# Patient Record
Sex: FEMALE | Race: WHITE | Employment: FULL TIME | URBAN - METROPOLITAN AREA
[De-identification: names, ages, dates, MRNs, and addresses within clinical notes are randomized per-mention and may not be internally consistent; named-entity substitution may affect disease eponyms.]

---

## 2017-03-10 ENCOUNTER — HOSPITAL ENCOUNTER (OUTPATIENT)
Age: 42
Discharge: HOME | End: 2017-03-10
Payer: COMMERCIAL

## 2017-03-10 VITALS
DIASTOLIC BLOOD PRESSURE: 78 MMHG | BODY MASS INDEX: 27.6 KG/M2 | SYSTOLIC BLOOD PRESSURE: 131 MMHG | TEMPERATURE: 98 F | RESPIRATION RATE: 18 BRPM | HEART RATE: 88 BPM | HEIGHT: 62 IN | WEIGHT: 150 LBS | OXYGEN SATURATION: 100 %

## 2017-03-10 DIAGNOSIS — H61.22 IMPACTED CERUMEN OF LEFT EAR: Primary | ICD-10-CM

## 2017-03-10 PROCEDURE — 99203 OFFICE O/P NEW LOW 30 MIN: CPT

## 2017-03-10 PROCEDURE — 69209 REMOVE IMPACTED EAR WAX UNI: CPT

## 2017-03-10 PROCEDURE — 99204 OFFICE O/P NEW MOD 45 MIN: CPT

## 2017-03-10 RX ORDER — DULOXETIN HYDROCHLORIDE 60 MG/1
60 CAPSULE, DELAYED RELEASE ORAL DAILY
COMMUNITY
End: 2017-11-29

## 2017-03-10 RX ORDER — HYDROCODONE BITARTRATE AND ACETAMINOPHEN 7.5; 325 MG/1; MG/1
1 TABLET ORAL EVERY 8 HOURS PRN
COMMUNITY
End: 2017-11-29

## 2017-03-10 RX ORDER — OFLOXACIN 3 MG/ML
10 SOLUTION AURICULAR (OTIC) DAILY
Qty: 1 BOTTLE | Refills: 0 | Status: SHIPPED | OUTPATIENT
Start: 2017-03-10 | End: 2017-03-17

## 2017-03-10 RX ORDER — MULTIVIT-MIN/IRON/FOLIC ACID/K 18-600-40
4000 CAPSULE ORAL
COMMUNITY

## 2017-03-10 NOTE — ED PROVIDER NOTES
Patient presents with:  Ear Problem Pain (neurosensory)      HPI:     Mercedes Crooks is a 37year old female who presents with a chief complaint of waking up this morning with the left ear feeling clogged. Patient denies any pain.   No history of URI sympt normocephalic  EYES: sclera non icteric bilateral, conjunctiva clear  EARS: TM  right: normal and cerumen impacted  left  NOSE: nasal turbinates: pink, normal mucosa  THROAT: clear, without exudates  LUNGS: clear to auscultation bilaterally; no rales, rhon

## 2017-07-08 ENCOUNTER — HOSPITAL ENCOUNTER (OUTPATIENT)
Dept: MRI IMAGING | Age: 42
Discharge: HOME | End: 2017-07-08
Attending: PHYSICIAN ASSISTANT
Payer: COMMERCIAL

## 2017-07-08 DIAGNOSIS — M25.512 LEFT SHOULDER PAIN, UNSPECIFIED CHRONICITY: ICD-10-CM

## 2017-07-08 PROCEDURE — 73221 MRI JOINT UPR EXTREM W/O DYE: CPT | Performed by: PHYSICIAN ASSISTANT

## 2017-11-22 ENCOUNTER — APPOINTMENT (OUTPATIENT)
Dept: LAB | Age: 42
End: 2017-11-22
Attending: INTERNAL MEDICINE
Payer: COMMERCIAL

## 2017-11-22 ENCOUNTER — OFFICE VISIT (OUTPATIENT)
Dept: RHEUMATOLOGY | Facility: CLINIC | Age: 42
End: 2017-11-22

## 2017-11-22 VITALS
BODY MASS INDEX: 27.08 KG/M2 | DIASTOLIC BLOOD PRESSURE: 88 MMHG | HEIGHT: 62 IN | WEIGHT: 147.19 LBS | SYSTOLIC BLOOD PRESSURE: 130 MMHG | HEART RATE: 88 BPM

## 2017-11-22 DIAGNOSIS — M43.06 LUMBAR SPONDYLOLYSIS: ICD-10-CM

## 2017-11-22 DIAGNOSIS — R53.83 FATIGUE, UNSPECIFIED TYPE: ICD-10-CM

## 2017-11-22 DIAGNOSIS — E55.9 VITAMIN D DEFICIENCY: ICD-10-CM

## 2017-11-22 DIAGNOSIS — E06.3 HASHIMOTO'S THYROIDITIS: ICD-10-CM

## 2017-11-22 DIAGNOSIS — M79.7 FIBROMYALGIA: Primary | ICD-10-CM

## 2017-11-22 PROCEDURE — 36415 COLL VENOUS BLD VENIPUNCTURE: CPT

## 2017-11-22 PROCEDURE — 84165 PROTEIN E-PHORESIS SERUM: CPT

## 2017-11-22 PROCEDURE — 85027 COMPLETE CBC AUTOMATED: CPT

## 2017-11-22 PROCEDURE — 99244 OFF/OP CNSLTJ NEW/EST MOD 40: CPT | Performed by: INTERNAL MEDICINE

## 2017-11-22 PROCEDURE — 99212 OFFICE O/P EST SF 10 MIN: CPT | Performed by: INTERNAL MEDICINE

## 2017-11-22 PROCEDURE — 80053 COMPREHEN METABOLIC PANEL: CPT

## 2017-11-22 PROCEDURE — 84439 ASSAY OF FREE THYROXINE: CPT

## 2017-11-22 PROCEDURE — 84481 FREE ASSAY (FT-3): CPT

## 2017-11-22 PROCEDURE — 85652 RBC SED RATE AUTOMATED: CPT

## 2017-11-22 PROCEDURE — 84443 ASSAY THYROID STIM HORMONE: CPT

## 2017-11-22 PROCEDURE — 82306 VITAMIN D 25 HYDROXY: CPT

## 2017-11-22 RX ORDER — DULOXETIN HYDROCHLORIDE 30 MG/1
30 CAPSULE, DELAYED RELEASE ORAL DAILY
Refills: 0 | COMMUNITY
Start: 2017-11-20 | End: 2017-11-29

## 2017-11-22 RX ORDER — TOPIRAMATE 100 MG/1
100 TABLET, FILM COATED ORAL NIGHTLY
Refills: 0 | COMMUNITY
Start: 2017-11-06 | End: 2017-11-29

## 2017-11-22 RX ORDER — TIZANIDINE 2 MG/1
2 TABLET ORAL 2 TIMES DAILY
Refills: 0 | COMMUNITY
Start: 2017-11-06 | End: 2018-02-12

## 2017-11-22 NOTE — PROGRESS NOTES
Dear Dr. Misa Boyd:    I saw your patient Melissa Mobley in consultation this afternoon at your request for evaluation of diffuse and chronic pain. As you know, she has a 46-year-old woman who started to develop diffuse myofascial discomfort in her 25s.   It children. She does office work. She smokes one half pack of cigarettes daily. This increases when she is stressed. Rare alcohol. Coffee daily. She tries to walk. Review of systems:  No fevers. She will go from freezing to having hot flashes.   Her Cymbalta. I gave her an up-to-date article on \"fibromyalgia\". I referred her to physical therapy for 12 visits to work on a slowly increasing exercise program.    1.  Rule out other conditions.   She had thyroid antibodies in the past.  TSH, free T3 and

## 2017-11-29 ENCOUNTER — OFFICE VISIT (OUTPATIENT)
Dept: FAMILY MEDICINE CLINIC | Facility: CLINIC | Age: 42
End: 2017-11-29

## 2017-11-29 VITALS
HEIGHT: 61.5 IN | WEIGHT: 144 LBS | BODY MASS INDEX: 26.84 KG/M2 | OXYGEN SATURATION: 99 % | HEART RATE: 112 BPM | SYSTOLIC BLOOD PRESSURE: 138 MMHG | DIASTOLIC BLOOD PRESSURE: 80 MMHG

## 2017-11-29 DIAGNOSIS — F41.9 ANXIETY AND DEPRESSION: ICD-10-CM

## 2017-11-29 DIAGNOSIS — F32.A ANXIETY AND DEPRESSION: ICD-10-CM

## 2017-11-29 DIAGNOSIS — K59.03 DRUG-INDUCED CONSTIPATION: ICD-10-CM

## 2017-11-29 DIAGNOSIS — G89.4 CHRONIC PAIN DISORDER: ICD-10-CM

## 2017-11-29 DIAGNOSIS — M79.7 FIBROMYALGIA: Primary | ICD-10-CM

## 2017-11-29 DIAGNOSIS — M25.519 ACUTE SHOULDER PAIN, UNSPECIFIED LATERALITY: ICD-10-CM

## 2017-11-29 PROCEDURE — 99204 OFFICE O/P NEW MOD 45 MIN: CPT | Performed by: FAMILY MEDICINE

## 2017-11-29 RX ORDER — TOPIRAMATE 100 MG/1
100 TABLET, FILM COATED ORAL NIGHTLY
Qty: 30 TABLET | Refills: 0 | Status: SHIPPED | OUTPATIENT
Start: 2017-11-29 | End: 2017-12-29

## 2017-11-29 RX ORDER — HYDROCODONE BITARTRATE AND ACETAMINOPHEN 7.5; 325 MG/1; MG/1
1 TABLET ORAL EVERY 12 HOURS PRN
Qty: 18 TABLET | Refills: 0 | Status: SHIPPED | OUTPATIENT
Start: 2017-11-29 | End: 2017-12-08

## 2017-11-29 RX ORDER — DULOXETIN HYDROCHLORIDE 60 MG/1
60 CAPSULE, DELAYED RELEASE ORAL DAILY
Qty: 30 CAPSULE | Refills: 0 | Status: SHIPPED | OUTPATIENT
Start: 2017-11-29 | End: 2018-02-28

## 2017-11-29 RX ORDER — POLYETHYLENE GLYCOL 3350 17 G/17G
17 POWDER, FOR SOLUTION ORAL DAILY
Qty: 510 G | Refills: 0 | Status: SHIPPED | OUTPATIENT
Start: 2017-11-29 | End: 2017-12-29

## 2017-11-29 RX ORDER — DULOXETIN HYDROCHLORIDE 30 MG/1
30 CAPSULE, DELAYED RELEASE ORAL DAILY
Qty: 30 CAPSULE | Refills: 0 | Status: SHIPPED | OUTPATIENT
Start: 2017-11-29 | End: 2017-12-29

## 2017-11-29 NOTE — PROGRESS NOTES
HPI:    Patient ID: Daniel Evans is a 40year old female. Fibromyalgia  -Goes to Pain clinic in [address] for zohydro and norco along with trigger point injection.  Sees TERESA Enciso  -On 10/2017 terminated from going to clinic due to high shilpa Rfl: 0   topiramate 100 MG Oral Tab Take 1 tablet (100 mg total) by mouth nightly. Disp: 30 tablet Rfl: 0   DULoxetine HCl 60 MG Oral Cap DR Particles Take 1 capsule (60 mg total) by mouth daily.  Disp: 30 capsule Rfl: 0   Polyethylene Glycol 3350 (MIRALAX) balance  -referral given for pain clinic. Will benefit from addiction program  -reviewed opiate usage with [place] .   -off zyhydro x1mo -> will NOT refill  -running out of norco (5d left) will refill to last total of 2wks  -continue PT  -concern that may be PSYCHIATRY         IG#[id number]

## 2017-11-30 NOTE — PATIENT INSTRUCTIONS
Treating Anxiety Disorders with Medicine  An anxiety disorder can make you feel nervous or apprehensive, even without a clear reason.  In people age 72 and older, generalized anxiety disorder is one of the most commonly diagnosed anxiety disorders. Many t Never use alcohol or other drugs with anti-anxiety medicines. This could result in loss of muscular control, sedation, coma, or death. Also, use only the amount of medicine prescribed for you.  If you think you may have taken too much, get emergency care ri · Sexual problems. Some antidepressants can affect your desire for sex or your ability to have an orgasm. A change in dosage or medicine often solves the problem. If you have a sexual side effect that concerns you, tell your healthcare provider.   · Addicti Treatment can greatly reduce pain. In many cases, pain can become less severe, occur less often, and interfere less with your daily life. Chronic pain is often treated with a combination of medicines, therapies, and lifestyle changes.  You will work closely © 5275-3145 [employer] [address]. [address] [facility], [facility] [place]. All rights reserved. This information is not intended as a substitute for professional medical care. Always follow your healthcare professional's instructions.         Joi · Anti-anxiety medicine. This medicine eases symptoms and helps you relax. Your healthcare provider will explain when and how to use it. It may be prescribed for use before situations that make you anxious.  You may also be told to take medicine on a regula · You may need to stop taking medicine slowly to give your body time to adjust. When it’s time to stop, your healthcare provider will tell you more.  Remember: Never stop taking your medicine without talking to your provider first.  · If symptoms return, yo Fiber is what gives strength and structure to plants. Most grains, beans, vegetables, and fruits contain fiber. Foods rich in fiber are often low in calories and fat, and they fill you up more.  They may also reduce your risks for certain health problems. T Keep track of how much fiber you eat. Start by reading food labels. Then eat a variety of foods high in fiber.  As you start to eat more fiber, ask your healthcare provider how much water you should be drinking to keep your digestive system working smoothly One of the best ways to help treat constipation is to increase your fiber intake. You can do this either through diet or by using fiber supplements. Fiber (in whole grains, fruits, and vegetables) adds bulk and absorbs water to soften the stool.  This helps

## 2017-12-06 ENCOUNTER — TELEPHONE (OUTPATIENT)
Dept: RHEUMATOLOGY | Facility: CLINIC | Age: 42
End: 2017-12-06

## 2017-12-11 ENCOUNTER — OFFICE VISIT (OUTPATIENT)
Dept: FAMILY MEDICINE CLINIC | Facility: CLINIC | Age: 42
End: 2017-12-11

## 2017-12-11 VITALS
BODY MASS INDEX: 27 KG/M2 | DIASTOLIC BLOOD PRESSURE: 64 MMHG | HEART RATE: 95 BPM | SYSTOLIC BLOOD PRESSURE: 110 MMHG | RESPIRATION RATE: 18 BRPM | OXYGEN SATURATION: 98 % | WEIGHT: 145 LBS

## 2017-12-11 DIAGNOSIS — M79.7 FIBROMYALGIA: ICD-10-CM

## 2017-12-11 DIAGNOSIS — F41.9 ANXIETY AND DEPRESSION: ICD-10-CM

## 2017-12-11 DIAGNOSIS — H91.91 HEARING LOSS OF RIGHT EAR, UNSPECIFIED HEARING LOSS TYPE: ICD-10-CM

## 2017-12-11 DIAGNOSIS — H93.11 TINNITUS OF RIGHT EAR: Primary | ICD-10-CM

## 2017-12-11 DIAGNOSIS — F32.A ANXIETY AND DEPRESSION: ICD-10-CM

## 2017-12-11 PROCEDURE — 99213 OFFICE O/P EST LOW 20 MIN: CPT | Performed by: FAMILY MEDICINE

## 2017-12-11 RX ORDER — HYDROCODONE BITARTRATE AND ACETAMINOPHEN 7.5; 325 MG/1; MG/1
1 TABLET ORAL 2 TIMES DAILY PRN
Qty: 14 TABLET | Refills: 0 | Status: SHIPPED | OUTPATIENT
Start: 2017-12-11 | End: 2017-12-18

## 2017-12-11 RX ORDER — HYDROCODONE BITARTRATE AND ACETAMINOPHEN 7.5; 325 MG/1; MG/1
TABLET ORAL
Refills: 0 | COMMUNITY
Start: 2017-11-06 | End: 2017-12-11

## 2017-12-11 NOTE — PATIENT INSTRUCTIONS
Tinnitus (Ringing in the Ears)     Treatment may include maskers and hearing aids. Tinnitus is the term for a noise in your ear not caused by an outside sound. The noise might be a ringing, clicking, hiss, or roar.  It can vary in pitch and may be sof © 5044-3942 [employer] [address]. 140[address] [facility] [place], [address]. All rights reserved. This information is not intended as a substitute for professional medical care. Always follow your healthcare professional's instructions.         Your He · An otolaryngologist (ear, nose, and throat healthcare provider) examines you to find out if there is a medical reason for your hearing loss. If there is, he or she may recommend treatment in addition to, or instead of, hearing aids.   · A hearing aid spec · Learn how to do exercises properly and safely. Consider talking with a physical therapist or  used to working with people with arthritis. · Start gradually and build. If you haven't been exercising, start slowly.  Don't exercise too hard or too lo Range-of-motion (ROM) exercises allow you to move each of your joints in every way they are intended to move. You should do ROM exercises for each joint 2 to 3 times a day. This will help you maintain full use of all of your joints.   Sample ROM exercises Many other exercise and activities benefit people with arthritis. It is most important to find exercise and activities that you enjoy.  You might try:  · Yoga, including chair yoga, helps to keep your joints strong and flexible.   · Zeyad Chi, an ancient type

## 2017-12-11 NOTE — PROGRESS NOTES
HPI:    Patient ID: Timbo Tsang is a 40year old female. -Feeling much better compared to prior visit    Fibromyalgia  -has upcoming pain clinic appt on the 20th  -previous regimen was: zohydro BID and norco 7.5mg TID. off zohydro for 2mo.  Doing Norc 1 tablet (100 mg total) by mouth nightly. Disp: 30 tablet Rfl: 0   DULoxetine HCl 60 MG Oral Cap DR Particles Take 1 capsule (60 mg total) by mouth daily. Disp: 30 capsule Rfl: 0   Polyethylene Glycol 3350 (MIRALAX) Oral Powder Take 17 g by mouth daily.  Jaziel Fuchs referral    Fibromyalgia  -stable  -terminated from previous external pain clinic due to high balance  -has upcoming pain clinic appt. Goal to wean off meds ad try more IM options  -reviewed opiate usage with [place] .   -off zyhydro x2mo -> will NOT refill

## 2017-12-20 ENCOUNTER — OFFICE VISIT (OUTPATIENT)
Dept: PAIN CLINIC | Facility: HOSPITAL | Age: 42
End: 2017-12-20
Attending: FAMILY MEDICINE
Payer: COMMERCIAL

## 2017-12-20 VITALS
SYSTOLIC BLOOD PRESSURE: 146 MMHG | HEIGHT: 61.6 IN | WEIGHT: 144 LBS | BODY MASS INDEX: 26.84 KG/M2 | DIASTOLIC BLOOD PRESSURE: 96 MMHG | HEART RATE: 120 BPM

## 2017-12-20 DIAGNOSIS — M79.7 FIBROMYALGIA: Primary | ICD-10-CM

## 2017-12-20 DIAGNOSIS — G89.4 CHRONIC PAIN DISORDER: ICD-10-CM

## 2017-12-20 PROCEDURE — 80349 CANNABINOIDS NATURAL: CPT | Performed by: ANESTHESIOLOGY

## 2017-12-20 PROCEDURE — 99201 HC OUTPT EVAL AND MGNT NEW PT LEVEL 1: CPT

## 2017-12-20 PROCEDURE — 80307 DRUG TEST PRSMV CHEM ANLYZR: CPT | Performed by: ANESTHESIOLOGY

## 2017-12-20 PROCEDURE — 80361 OPIATES 1 OR MORE: CPT | Performed by: ANESTHESIOLOGY

## 2017-12-20 RX ORDER — HYDROCODONE BITARTRATE AND ACETAMINOPHEN 10; 325 MG/1; MG/1
1 TABLET ORAL 2 TIMES DAILY
Qty: 60 TABLET | Refills: 0 | Status: SHIPPED | OUTPATIENT
Start: 2017-12-20 | End: 2017-12-20

## 2017-12-20 RX ORDER — HYDROCODONE BITARTRATE AND ACETAMINOPHEN 10; 325 MG/1; MG/1
1 TABLET ORAL 2 TIMES DAILY
Qty: 60 TABLET | Refills: 0 | Status: SHIPPED | OUTPATIENT
Start: 2018-01-19 | End: 2018-02-12

## 2017-12-20 RX ORDER — MELOXICAM 15 MG/1
15 TABLET ORAL DAILY
Qty: 30 TABLET | Refills: 2 | Status: SHIPPED | OUTPATIENT
Start: 2017-12-20 | End: 2018-03-16

## 2017-12-20 RX ORDER — HYDROCODONE BITARTRATE AND ACETAMINOPHEN 7.5; 325 MG/1; MG/1
1 TABLET ORAL EVERY 12 HOURS PRN
COMMUNITY
End: 2018-02-12

## 2017-12-20 NOTE — PROGRESS NOTES
Fibromyalgia  -Goes to Pain clinic in [place] Q1mo for zohydro and norco along with trigger point injection. Sees TERESA Gong Memos  -On 10/2017 terminated from going to clinic due to high balance which patient is unable to pay at once.  They only provided N

## 2017-12-20 NOTE — CHRONIC PAIN
Initial Consultation Note      HISTORY OF PRESENT ILLNESS:  Jennifer Yost is a 40year old old female referred to the pain clinic  for evaluation treatment of her fibromyalgia type symptoms Hollis Rojo was seen at a pain clinic in [facility] for quite some Polyethylene Glycol 3350 (MIRALAX) Oral Powder Take 17 g by mouth daily. Disp: 510 g Rfl: 0   DULoxetine HCl 30 MG Oral Cap DR Particles Take 1 capsule (30 mg total) by mouth daily.  Total of 90mg Disp: 30 capsule Rfl: 0   TiZANidine HCl 2 MG Oral Tab [facility] TRANSFORAMINAL EPIDURAL                STEROID INJECTION;  Surgeon: Santi Rodriguez DO;  Location: [address] [place] [address]    REVIEW OF SYSTEMS:   A comprehensive review of systems was negative except for:     MEDICAL HISTORY:  Patient Ac - No  Spine: Normal    ROM:   Lumbar spine  Flexion  full  Extension  Cervical Spine  Flexion full  Extension  MOTOR EXAMINATION:  UPPER EXTREMITY      LEFT RIGHT   Deltoid 5/5 5/5   Biceps 5/5 5/5   Triceps 5/5 5/5   Brachioradialis 5/5 5/5   Wrist Flexor thoroughly with Mrs. alvarado   will try to minimize the reliance on pain medications she is down to 2 Norco a day which is a decrease from the prior pain clinic that she is on and she is comfortable with this level  Psych support for her depression health mat

## 2017-12-20 NOTE — PROGRESS NOTES
Fibromyalgia  -Goes to Pain clinic in [place] Q1mo for zohydro and norco along with trigger point injection. Sees PTIMOTHY Finch  -On 10/2017 terminated from going to clinic due to high balance which patient is unable to pay at once.  They only provided N

## 2018-01-30 RX ORDER — DULOXETIN HYDROCHLORIDE 30 MG/1
CAPSULE, DELAYED RELEASE ORAL
Qty: 30 CAPSULE | Refills: 0 | Status: SHIPPED | OUTPATIENT
Start: 2018-01-30 | End: 2018-02-28

## 2018-02-12 ENCOUNTER — OFFICE VISIT (OUTPATIENT)
Dept: PAIN CLINIC | Facility: HOSPITAL | Age: 43
End: 2018-02-12
Attending: NURSE PRACTITIONER
Payer: COMMERCIAL

## 2018-02-12 VITALS — HEART RATE: 108 BPM | DIASTOLIC BLOOD PRESSURE: 87 MMHG | SYSTOLIC BLOOD PRESSURE: 141 MMHG

## 2018-02-12 DIAGNOSIS — M79.7 FIBROMYALGIA: Primary | ICD-10-CM

## 2018-02-12 PROCEDURE — 99211 OFF/OP EST MAY X REQ PHY/QHP: CPT

## 2018-02-12 RX ORDER — MELOXICAM 15 MG/1
15 TABLET ORAL DAILY
COMMUNITY
End: 2018-08-16

## 2018-02-12 RX ORDER — HYDROCODONE BITARTRATE AND ACETAMINOPHEN 10; 325 MG/1; MG/1
1 TABLET ORAL EVERY 12 HOURS
Qty: 60 TABLET | Refills: 0 | Status: SHIPPED | OUTPATIENT
Start: 2018-03-18 | End: 2018-04-02

## 2018-02-12 RX ORDER — HYDROCODONE BITARTRATE AND ACETAMINOPHEN 10; 325 MG/1; MG/1
1 TABLET ORAL 2 TIMES DAILY
Qty: 60 TABLET | Refills: 0 | Status: SHIPPED | OUTPATIENT
Start: 2018-02-17 | End: 2018-03-19

## 2018-02-12 RX ORDER — TOPIRAMATE 100 MG/1
100 TABLET, FILM COATED ORAL DAILY
COMMUNITY
End: 2018-06-24

## 2018-02-12 NOTE — PROGRESS NOTES
Patient presents to [facility] ambulatory for med eval.  She has hx of fibromyalgia with generalized pain, neck, low back, arms thighs. She describes the pain and burning and very sensitive skin. She takes norco BID for pain which helps her tolerate activity.

## 2018-02-12 NOTE — CHRONIC PAIN
MEDICATION EVALUATION  HISTORY OF PRESENT ILLNESS:  Mercedes Cesar is a 40year old old female with history of Fibromyalgia  (primary encounter diagnosis) who returns for medication evaluation. Patient continues to report upper back and lower back pain.  Pa pain.  Numbness/tingling: as above  Weakness: as above  Weight Loss: Negative   Fever: Negative   Cardiovascular:  No current chest pain or palpitations   Respiratory:  No current shortness of breath   Gastrointestinal:  No active ulcer  Genitourinary:  Ne Location: [address] [place] [address]  9/24/2015: INJECTION, W/WO CONTRAST, DX/THERAPEUTIC SUBST* N/A      Comment: Procedure: LUMBAR / TRANSFORAMINAL EPIDURAL                STEROID INJECTION;  Surgeon: Do Morgan DO;  [place] [facility] interested in obtaining medical marijuana card. We discussed that if she obtains this and has relief we would wean off the Clarissa Joshua. She and her  verbalized understanding of this and will notify the pain clinic if card obtained.      - Discussed boris

## 2018-02-23 ENCOUNTER — OFFICE VISIT (OUTPATIENT)
Dept: FAMILY MEDICINE CLINIC | Facility: CLINIC | Age: 43
End: 2018-02-23

## 2018-02-23 VITALS
HEART RATE: 95 BPM | WEIGHT: 153 LBS | DIASTOLIC BLOOD PRESSURE: 70 MMHG | RESPIRATION RATE: 18 BRPM | SYSTOLIC BLOOD PRESSURE: 120 MMHG | BODY MASS INDEX: 28.52 KG/M2 | HEIGHT: 61.5 IN | OXYGEN SATURATION: 98 %

## 2018-02-23 DIAGNOSIS — M79.7 FIBROMYALGIA: Primary | ICD-10-CM

## 2018-02-23 DIAGNOSIS — E06.3 HASHIMOTO'S THYROIDITIS: ICD-10-CM

## 2018-02-23 DIAGNOSIS — R63.5 WEIGHT GAIN: ICD-10-CM

## 2018-02-23 PROCEDURE — 99213 OFFICE O/P EST LOW 20 MIN: CPT | Performed by: FAMILY MEDICINE

## 2018-02-23 NOTE — PROGRESS NOTES
HPI:    Patient ID: Beverly Merrill is a 40year old female. Fibromyalgia  -stable  -f/u with pain clinic regularly  -for past 1mo using CBD oil with significant improvement.  Has been able to wean off to only 2 norco/day  -interested in medical marijuana DR Particles Take 1 capsule (60 mg total) by mouth daily. Disp: 30 capsule Rfl: 0   Cholecalciferol (VITAMIN D) 2000 units Oral Cap Take 4,000 Units by mouth.    Disp:  Rfl:      Allergies:No Known Allergies   PHYSICAL EXAM:   Physical Exam   Vitals reviewe

## 2018-03-19 RX ORDER — MELOXICAM 15 MG/1
TABLET ORAL
Qty: 90 TABLET | Refills: 0 | Status: SHIPPED | OUTPATIENT
Start: 2018-03-19 | End: 2018-08-16

## 2018-04-02 ENCOUNTER — OFFICE VISIT (OUTPATIENT)
Dept: PAIN CLINIC | Facility: HOSPITAL | Age: 43
End: 2018-04-02
Attending: ANESTHESIOLOGY
Payer: COMMERCIAL

## 2018-04-02 VITALS
SYSTOLIC BLOOD PRESSURE: 136 MMHG | BODY MASS INDEX: 28.52 KG/M2 | RESPIRATION RATE: 16 BRPM | HEIGHT: 61.5 IN | DIASTOLIC BLOOD PRESSURE: 89 MMHG | HEART RATE: 111 BPM | WEIGHT: 153 LBS

## 2018-04-02 DIAGNOSIS — E06.3 HASHIMOTO'S THYROIDITIS: ICD-10-CM

## 2018-04-02 DIAGNOSIS — G89.4 CHRONIC PAIN DISORDER: ICD-10-CM

## 2018-04-02 DIAGNOSIS — M79.7 FIBROMYALGIA: Primary | ICD-10-CM

## 2018-04-02 PROCEDURE — 99211 OFF/OP EST MAY X REQ PHY/QHP: CPT

## 2018-04-02 RX ORDER — HYDROCODONE BITARTRATE AND ACETAMINOPHEN 10; 325 MG/1; MG/1
1 TABLET ORAL EVERY 12 HOURS
Qty: 60 TABLET | Refills: 0 | Status: SHIPPED | OUTPATIENT
Start: 2018-04-19 | End: 2018-04-02

## 2018-04-02 RX ORDER — HYDROCODONE BITARTRATE AND ACETAMINOPHEN 10; 325 MG/1; MG/1
1 TABLET ORAL EVERY 12 HOURS
Qty: 60 TABLET | Refills: 0 | Status: SHIPPED | OUTPATIENT
Start: 2018-05-19 | End: 2018-06-18

## 2018-04-02 NOTE — CHRONIC PAIN
MEDICATION EVALUATION  HISTORY OF PRESENT ILLNESS:  Jayant Gastelum is a 40year old old female with history of Fibromyalgia  (primary encounter diagnosis)  Chronic pain disorder  Hashimoto's thyroiditis antibodies per the patient who returns for medication as above  Weakness: as above  Weight Loss: Negative   Fever: Negative   Cardiovascular:  No current chest pain or palpitations   Respiratory:  No current shortness of breath   Gastrointestinal:  No active ulcer  Genitourinary:  Negative  Psychiatric:  Leandrew Overall Shelli  9/24/2015: INJECTION, W/WO CONTRAST, DX/THERAPEUTIC SUBST* N/A      Comment: Procedure: LUMBAR / TRANSFORAMINAL EPIDURAL                STEROID INJECTION;  Surgeon: Rosa Isela David DO;  Location: [address] [place] [address]  11/5  regarding treatment options. She is interested in obtaining medical marijuana card.  We discussed that if she obtains this and has relief we would wean off the [address]. She and her  verbalized understanding of this and will notify the pain clin

## 2018-04-06 ENCOUNTER — OFFICE VISIT (OUTPATIENT)
Dept: ENDOCRINOLOGY CLINIC | Facility: CLINIC | Age: 43
End: 2018-04-06

## 2018-04-06 VITALS
DIASTOLIC BLOOD PRESSURE: 76 MMHG | BODY MASS INDEX: 28.89 KG/M2 | WEIGHT: 155 LBS | HEIGHT: 61.5 IN | HEART RATE: 102 BPM | SYSTOLIC BLOOD PRESSURE: 134 MMHG

## 2018-04-06 DIAGNOSIS — E06.3 THYROIDITIS, AUTOIMMUNE: Primary | ICD-10-CM

## 2018-04-06 PROCEDURE — 99203 OFFICE O/P NEW LOW 30 MIN: CPT | Performed by: INTERNAL MEDICINE

## 2018-04-06 PROCEDURE — 99212 OFFICE O/P EST SF 10 MIN: CPT | Performed by: INTERNAL MEDICINE

## 2018-04-06 NOTE — PROGRESS NOTES
Name: Aguila Mathews  Date: 4/6/2018    Referring Physician: No ref. provider found    Patient presents with:  Thyroid Problem: Hashimoto's      HISTORY OF PRESENT ILLNESS   Aguila Mathews is a 40year old female who presents for Patient presents with:   Shen Elizabeth Oral Tab, TAKE ONE TABLET BY MOUTH ONE TIME DAILY , Disp: 90 tablet, Rfl: 0  •  DULoxetine HCl 30 MG Oral Cap DR Particles, Take 1 capsule (30 mg total) by mouth once daily. , Disp: 90 capsule, Rfl: 0  •  topiramate 100 MG Oral Tab, Take 100 mg by mouth galdino INJECTION;  Surgeon: Reagan Bernal DO;  Location: [address] [place] [address]  8/28/2015: INJECTION, W/WO CONTRAST, DX/THERAPEUTIC SUBST* N/A      Comment: Procedure: LUMBAR / TRANSFORAMINAL EPIDURAL                STEROID INJECTION;  Surge exam  -Recheck TSH, FT4, FT3, TPO Ab, TG Ab   -Check Thyroid US   -Check FSH, estradiol given lack of menstrual cycle  -Discussed importance of low CHO diet  -Further management based on above results    RTC 6 months      4/6/2018  Lin Springer MD

## 2018-04-09 ENCOUNTER — APPOINTMENT (OUTPATIENT)
Dept: LAB | Age: 43
End: 2018-04-09
Attending: INTERNAL MEDICINE
Payer: COMMERCIAL

## 2018-04-09 DIAGNOSIS — E06.3 THYROIDITIS, AUTOIMMUNE: ICD-10-CM

## 2018-04-09 PROCEDURE — 84481 FREE ASSAY (FT-3): CPT

## 2018-04-09 PROCEDURE — 84443 ASSAY THYROID STIM HORMONE: CPT

## 2018-04-09 PROCEDURE — 84439 ASSAY OF FREE THYROXINE: CPT

## 2018-04-09 PROCEDURE — 82670 ASSAY OF TOTAL ESTRADIOL: CPT

## 2018-04-09 PROCEDURE — 86376 MICROSOMAL ANTIBODY EACH: CPT

## 2018-04-09 PROCEDURE — 83001 ASSAY OF GONADOTROPIN (FSH): CPT

## 2018-04-09 PROCEDURE — 86800 THYROGLOBULIN ANTIBODY: CPT

## 2018-04-09 PROCEDURE — 84432 ASSAY OF THYROGLOBULIN: CPT

## 2018-04-09 PROCEDURE — 36415 COLL VENOUS BLD VENIPUNCTURE: CPT

## 2018-04-12 ENCOUNTER — TELEPHONE (OUTPATIENT)
Dept: ENDOCRINOLOGY CLINIC | Facility: CLINIC | Age: 43
End: 2018-04-12

## 2018-04-12 DIAGNOSIS — E06.9 THYROIDITIS: Primary | ICD-10-CM

## 2018-04-12 NOTE — TELEPHONE ENCOUNTER
Please call patient - labs demonstrate that she is post-menopausal which is premature given her young age. Recommend follow up with her gynecologist to see if she is a candidate for hormone replacement therapy.   In addition her labs do demonstrate largely

## 2018-04-13 ENCOUNTER — HOSPITAL ENCOUNTER (OUTPATIENT)
Dept: ULTRASOUND IMAGING | Age: 43
Discharge: HOME | End: 2018-04-13
Attending: INTERNAL MEDICINE
Payer: COMMERCIAL

## 2018-04-13 DIAGNOSIS — E06.3 THYROIDITIS, AUTOIMMUNE: ICD-10-CM

## 2018-04-13 PROCEDURE — 76536 US EXAM OF HEAD AND NECK: CPT | Performed by: INTERNAL MEDICINE

## 2018-04-17 RX ORDER — LEVOTHYROXINE SODIUM 0.03 MG/1
25 TABLET ORAL
Qty: 30 TABLET | Refills: 2 | Status: SHIPPED | OUTPATIENT
Start: 2018-04-17 | End: 2018-07-11

## 2018-04-17 NOTE — TELEPHONE ENCOUNTER
Spoke with Keli Graham and discussed both notes from provider below about thyroid labs and thyroid US. Pt understands to begin taking LT4 25 mcg 30-60 mins before breakfast and repeat labs in 2 mos.  Pt also understands to call OBGYN to discuss possible hormo

## 2018-04-17 NOTE — TELEPHONE ENCOUNTER
Also please tell patient that thyroid US demonstrated diffuse inflammation which is consistent with positive Ab level. No need for further evaluation at this time and recommend repeat US in one year.

## 2018-04-20 ENCOUNTER — APPOINTMENT (OUTPATIENT)
Dept: INTEGRATIVE MEDICINE | Facility: HOSPITAL | Age: 43
End: 2018-04-20
Attending: GENERAL ACUTE CARE HOSPITAL

## 2018-05-07 ENCOUNTER — PATIENT MESSAGE (OUTPATIENT)
Dept: ENDOCRINOLOGY CLINIC | Facility: CLINIC | Age: 43
End: 2018-05-07

## 2018-05-08 NOTE — TELEPHONE ENCOUNTER
Spoke with the patient. She was tearful on the phone. States that she has good and bad days with her depression and has had more bad days recently. Her  is with her now.  Denies thoughts of harming herself or others states she loves her family to [facility]

## 2018-05-08 NOTE — TELEPHONE ENCOUNTER
From: Paul Hameed  To: Obed Triplett MD  Sent: 5/7/2018 1:59 PM CDT  Subject: Prescription Question    Hi Dr. Vipin Machado,  Since I started taking the Levothyroxine, I have noticed that the first week I felt great, less hair loss too.  Now the last week I have

## 2018-05-08 NOTE — TELEPHONE ENCOUNTER
Called the patient. LMTCB. Would like to screen for any thoughts of harming self and get more details on symptoms. LMTCB.

## 2018-05-09 NOTE — TELEPHONE ENCOUNTER
Called patient and informed her of message below. Understands to follow up with PCP regarding worsening symptoms.

## 2018-05-09 NOTE — TELEPHONE ENCOUNTER
Please call patient -  Her thyroid abnormality was very mild and she is on a fairly small amount of thyroid hormone. I do not think the exacerbation of her symptoms is related to her thyroid or the medication.   Please ask her to make follow up appt with ALVARADO

## 2018-06-19 ENCOUNTER — OFFICE VISIT (OUTPATIENT)
Dept: PAIN CLINIC | Facility: HOSPITAL | Age: 43
End: 2018-06-19
Attending: ANESTHESIOLOGY
Payer: COMMERCIAL

## 2018-06-19 VITALS — SYSTOLIC BLOOD PRESSURE: 132 MMHG | DIASTOLIC BLOOD PRESSURE: 74 MMHG | HEART RATE: 77 BPM

## 2018-06-19 DIAGNOSIS — M79.7 FIBROMYALGIA: Primary | ICD-10-CM

## 2018-06-19 PROCEDURE — 99211 OFF/OP EST MAY X REQ PHY/QHP: CPT

## 2018-06-19 RX ORDER — HYDROCODONE BITARTRATE AND ACETAMINOPHEN 10; 325 MG/1; MG/1
1 TABLET ORAL EVERY 12 HOURS
Qty: 60 TABLET | Refills: 0 | Status: SHIPPED | OUTPATIENT
Start: 2018-06-19 | End: 2018-07-19

## 2018-06-19 RX ORDER — HYDROCODONE BITARTRATE AND ACETAMINOPHEN 10; 325 MG/1; MG/1
1 TABLET ORAL EVERY 12 HOURS
Qty: 60 TABLET | Refills: 0 | Status: SHIPPED | OUTPATIENT
Start: 2018-07-17 | End: 2018-08-16

## 2018-06-19 RX ORDER — MULTIVITAMIN WITH IRON
TABLET ORAL
COMMUNITY
End: 2019-06-13

## 2018-06-19 RX ORDER — HYDROCODONE BITARTRATE AND ACETAMINOPHEN 10; 325 MG/1; MG/1
1 TABLET ORAL 2 TIMES DAILY
COMMUNITY
End: 2018-08-16

## 2018-06-19 NOTE — CHRONIC PAIN
MEDICATION EVALUATION  HISTORY OF PRESENT ILLNESS:  Angel Lauren is a 40year old old female with history of Fibromyalgia  (primary encounter diagnosis) antibodies per the patient who returns for medication evaluation.  She states that she believes the  mcg total) by mouth before breakfast. Disp: 30 tablet Rfl: 2   MELOXICAM 15 MG Oral Tab TAKE ONE TABLET BY MOUTH ONE TIME DAILY  Disp: 90 tablet Rfl: 0   topiramate 100 MG Oral Tab Take 100 mg by mouth daily.  Disp:  Rfl:    Meloxicam 15 MG Oral Tab Take 15 STEROID INJECTION;  Surgeon: Brii Gonzalez DO;  Location: [address] [place] [address]  11/5/2015: Anne MAGANA & Azalea Marti NDL/CATH SPI DX/THER BIO N/A      Comment: Procedure: LUMBAR / TRANSFORAMINAL EPIDURAL                STEROID INJECTION;  S maker.  PHYSICAL EXAMINATION:   06/19/18  1041   BP: 132/74   Pulse: 77      General: Alert and oriented x3  Affect:  NAD  Gait: Normal;  cane user - No  Spine: Normal  Edema - Absent  Skin - normal     IMAGING:  No new relevant studies     [place] PHYSICIAN [place]

## 2018-06-19 NOTE — PROGRESS NOTES
Patient presents to [facility] ambulatory for med eval.  She has chronic neck pain that radiates to bilateral shoulders, upper back and upper arms as well as low back pain that radiates to bilateral hips and legs. She takes norco bid and it helps.   She states jaguar

## 2018-06-25 RX ORDER — TOPIRAMATE 100 MG/1
TABLET, FILM COATED ORAL
Qty: 90 TABLET | Refills: 0 | Status: SHIPPED | OUTPATIENT
Start: 2018-06-25 | End: 2018-10-18

## 2018-06-25 RX ORDER — DULOXETIN HYDROCHLORIDE 60 MG/1
CAPSULE, DELAYED RELEASE ORAL
Qty: 90 CAPSULE | Refills: 0 | Status: SHIPPED | OUTPATIENT
Start: 2018-06-25 | End: 2018-08-16

## 2018-07-11 RX ORDER — LEVOTHYROXINE SODIUM 0.03 MG/1
TABLET ORAL
Qty: 30 TABLET | Refills: 5 | Status: SHIPPED | OUTPATIENT
Start: 2018-07-11 | End: 2019-01-11

## 2018-08-16 ENCOUNTER — OFFICE VISIT (OUTPATIENT)
Dept: PAIN CLINIC | Facility: HOSPITAL | Age: 43
End: 2018-08-16
Attending: ANESTHESIOLOGY
Payer: COMMERCIAL

## 2018-08-16 VITALS
HEART RATE: 108 BPM | DIASTOLIC BLOOD PRESSURE: 94 MMHG | BODY MASS INDEX: 28.89 KG/M2 | SYSTOLIC BLOOD PRESSURE: 142 MMHG | WEIGHT: 155 LBS | RESPIRATION RATE: 18 BRPM | HEIGHT: 61.6 IN

## 2018-08-16 DIAGNOSIS — M54.16 LUMBAR RADICULOPATHY, CHRONIC: ICD-10-CM

## 2018-08-16 DIAGNOSIS — G89.4 CHRONIC PAIN DISORDER: Primary | ICD-10-CM

## 2018-08-16 PROCEDURE — 99211 OFF/OP EST MAY X REQ PHY/QHP: CPT

## 2018-08-16 RX ORDER — DULOXETIN HYDROCHLORIDE 30 MG/1
CAPSULE, DELAYED RELEASE ORAL
COMMUNITY
End: 2018-08-16

## 2018-08-16 RX ORDER — LAMOTRIGINE 25 MG/1
200 TABLET ORAL
Refills: 0 | COMMUNITY
Start: 2018-06-27 | End: 2019-07-12

## 2018-08-16 RX ORDER — LITHIUM CARBONATE 300 MG/1
450 CAPSULE ORAL DAILY
Refills: 0 | COMMUNITY
Start: 2018-08-13 | End: 2021-04-06

## 2018-08-16 RX ORDER — HYDROCODONE BITARTRATE AND ACETAMINOPHEN 7.5; 325 MG/1; MG/1
10 TABLET ORAL
COMMUNITY
End: 2018-08-16

## 2018-08-16 RX ORDER — HYDROCODONE BITARTRATE AND ACETAMINOPHEN 10; 325 MG/1; MG/1
1 TABLET ORAL EVERY 12 HOURS
Qty: 60 TABLET | Refills: 0 | Status: SHIPPED | OUTPATIENT
Start: 2018-08-16 | End: 2018-08-16

## 2018-08-16 RX ORDER — HYDROCODONE BITARTRATE AND ACETAMINOPHEN 10; 325 MG/1; MG/1
1 TABLET ORAL EVERY 12 HOURS
Qty: 60 TABLET | Refills: 0 | Status: SHIPPED | OUTPATIENT
Start: 2018-09-16 | End: 2018-08-16

## 2018-08-16 RX ORDER — BUSPIRONE HYDROCHLORIDE 15 MG/1
15 TABLET ORAL 3 TIMES DAILY
Refills: 0 | COMMUNITY
Start: 2018-08-13 | End: 2020-07-08

## 2018-08-16 RX ORDER — TOPIRAMATE 100 MG/1
TABLET, FILM COATED ORAL
COMMUNITY
End: 2018-08-16

## 2018-08-16 RX ORDER — HYDROCODONE BITARTRATE AND ACETAMINOPHEN 10; 325 MG/1; MG/1
1 TABLET ORAL EVERY 12 HOURS
Qty: 60 TABLET | Refills: 0 | Status: SHIPPED | OUTPATIENT
Start: 2018-09-15 | End: 2018-10-15

## 2018-08-16 RX ORDER — MAGNESIUM OXIDE 400 MG (241.3 MG MAGNESIUM) TABLET
400 TABLET DAILY
COMMUNITY
End: 2019-06-13

## 2018-08-16 NOTE — CHRONIC PAIN
MEDICATION EVALUATION  HISTORY OF PRESENT ILLNESS:  Dayanara Lemos is a 44year old old female with history of Chronic pain disorder  (primary encounter diagnosis)  Lumbar radiculopathy, chronic antibodies per the patient who returns for medication evaluat total) by mouth once daily. Disp: 90 capsule Rfl: 0   PATIENT SUPPLIED MEDICATION  Disp:  Rfl:    Cholecalciferol (VITAMIN D) 2000 units Oral Cap Take 4,000 Units by mouth.    Disp:  Rfl:         REVIEW OF SYSTEMS:   Bowel/Bladder Incontinence: as above  Co STEROID INJECTION;  Surgeon: Danielle Katz DO;  Location: [address] [place] [address]  8/28/2015: INJECTION, W/WO CONTRAST, DX/THERAPEUTIC SUBST* N/A      Comment: Procedure: LUMBAR / TRANSFORAMINAL EPIDURAL                STEROID INJECTION studies     [place] PHYSICIAN MONITORING PROGRAM REVIEWED  Yes      ASSESSMENT AND PLAN:    Yue López is a 44year old  female, with  History of Chronic pain disorder  (primary encounter diagnosis)  Lumbar radiculopathy, chronic.   She has been maintained w

## 2018-08-16 NOTE — PROGRESS NOTES
Fibromyalgia  -Goes to Pain clinic in [place] Q1mo for zohydro and norco along with trigger point injection. Sees TERESA Gong Memos  -On 10/2017 terminated from going to clinic due to high balance which patient is unable to pay at once.  They only provided N

## 2018-08-27 RX ORDER — DULOXETIN HYDROCHLORIDE 30 MG/1
CAPSULE, DELAYED RELEASE ORAL
Qty: 90 CAPSULE | Refills: 0 | OUTPATIENT
Start: 2018-08-27

## 2018-09-27 RX ORDER — TOPIRAMATE 100 MG/1
TABLET, FILM COATED ORAL
Qty: 90 TABLET | Refills: 0 | OUTPATIENT
Start: 2018-09-27

## 2018-10-15 RX ORDER — HYDROCODONE BITARTRATE AND ACETAMINOPHEN 10; 325 MG/1; MG/1
1 TABLET ORAL EVERY 12 HOURS PRN
COMMUNITY
End: 2018-10-15

## 2018-10-18 ENCOUNTER — OFFICE VISIT (OUTPATIENT)
Dept: OBGYN CLINIC | Facility: CLINIC | Age: 43
End: 2018-10-18
Payer: COMMERCIAL

## 2018-10-18 VITALS
WEIGHT: 164 LBS | DIASTOLIC BLOOD PRESSURE: 74 MMHG | BODY MASS INDEX: 30.18 KG/M2 | SYSTOLIC BLOOD PRESSURE: 116 MMHG | HEIGHT: 62 IN | HEART RATE: 88 BPM

## 2018-10-18 DIAGNOSIS — N95.0 POSTMENOPAUSAL BLEEDING: Primary | ICD-10-CM

## 2018-10-18 PROCEDURE — 99202 OFFICE O/P NEW SF 15 MIN: CPT | Performed by: ADVANCED PRACTICE MIDWIFE

## 2018-10-18 NOTE — CHRONIC PAIN
MEDICATION EVALUATION  HISTORY OF PRESENT ILLNESS:  Humaira Rojas is a 44year old old female with history of fibromyalgia, joint pain, lumbar radiculopathy who returns for medication evaluation.   She recently returned from a trip to [place] and repor of breath.     MEDICAL HISTORY:  Patient Active Problem List:     Acne rosacea     Anxiety state     Sciatica, right     Lumbar radiculopathy, chronic     Lumbar spondylolysis     Spondylolisthesis, grade 1     Herniated nucleus pulposus, L5-S1     Right-si Xiomara Landrum DO;  Location: [address] [place] [address]   • LUMBAR / TRANSFORAMINAL EPIDURAL STEROID INJECTION N/A 11/5/2015    Performed by Tian Dudley DO at [place] [address] / TRANSFORAMINAL EPIDURAL STEROID INJECTION N/A 9/24/2015 No  Spine: Normal  Edema - Absent  Skin - normal     IMAGING:  No new relevant studies     [place] PHYSICIAN MONITORING PROGRAM REVIEWED  Yes      ASSESSMENT AND PLAN:    Timbo Tsang is a 44year old  female, with  History of Chronic, continuous use of opioi

## 2018-10-18 NOTE — PROGRESS NOTES
HPI:    Patient ID: Douglas Bello is a 44year old female who presents with spotting after intercourse on month ago. Monday vaginal bleeding required 3-4 pads daily x 4 days. Spotting today. LMP was 8/2017 labs 4/18 confirmed menopause. Pt had 2 SVDs. 100% of time  Meds This Visit:  Requested Prescriptions      No prescriptions requested or ordered in this encounter       Imaging & Referrals:  US PELVIS (TRANSVAGINAL PELVIS) (CPT=76830)         LR#[id number]

## 2018-10-18 NOTE — PROGRESS NOTES
Fibromyalgia  -Goes to Pain clinic in [place] Q1mo for zohydro and norco along with trigger point injection. Sees TERESA Castillo  -On 10/2017 terminated from going to clinic due to high balance which patient is unable to pay at once.  They only provided N

## 2018-10-20 ENCOUNTER — OFFICE VISIT (OUTPATIENT)
Dept: OBGYN CLINIC | Facility: CLINIC | Age: 43
End: 2018-10-20
Payer: COMMERCIAL

## 2018-10-20 VITALS
SYSTOLIC BLOOD PRESSURE: 112 MMHG | HEART RATE: 80 BPM | DIASTOLIC BLOOD PRESSURE: 72 MMHG | BODY MASS INDEX: 30 KG/M2 | WEIGHT: 162 LBS

## 2018-10-20 DIAGNOSIS — N84.1 MUCOUS POLYP OF CERVIX: ICD-10-CM

## 2018-10-20 DIAGNOSIS — Z12.31 SCREENING MAMMOGRAM, ENCOUNTER FOR: ICD-10-CM

## 2018-10-20 DIAGNOSIS — Z01.419 WOMEN'S ANNUAL ROUTINE GYNECOLOGICAL EXAMINATION: Primary | ICD-10-CM

## 2018-10-20 DIAGNOSIS — N84.1 CERVICAL POLYP: ICD-10-CM

## 2018-10-20 DIAGNOSIS — Z32.00 PREGNANCY EXAMINATION OR TEST, PREGNANCY UNCONFIRMED: ICD-10-CM

## 2018-10-20 DIAGNOSIS — Z12.4 SCREENING FOR MALIGNANT NEOPLASM OF CERVIX: ICD-10-CM

## 2018-10-20 PROCEDURE — 99396 PREV VISIT EST AGE 40-64: CPT | Performed by: ADVANCED PRACTICE MIDWIFE

## 2018-10-20 PROCEDURE — 81025 URINE PREGNANCY TEST: CPT | Performed by: ADVANCED PRACTICE MIDWIFE

## 2018-10-20 PROCEDURE — 57500 BIOPSY OF CERVIX: CPT | Performed by: ADVANCED PRACTICE MIDWIFE

## 2018-10-20 NOTE — PROGRESS NOTES
HPI:    Patient ID: Enid Lee is a 44year old female who presents for her annual gyne exam and EMB for PMB. This exam is limited to gyne    HPI    Review of Systems   Genitourinary: Negative.                Current Outpatient Medications:  [START ON 1 ASSESSMENT/PLAN:   Women's annual routine gynecological examination  (primary encounter diagnosis)  Screening mammogram, encounter for  Cervical polyp  Mucous polyp of cervix    Orders Placed This Encounter      BIOPSY/EXCIS CERVICAL LESN      Meds This Vi

## 2018-10-20 NOTE — PROCEDURES
Polyp Removal    Pregnancy Results: negative from urine test   Birth control method(s) used:   none:      Consent signed. Procedure discussed with patient in detail including indication, risk, benefits, alternatives and complications.     Procedure:  Marisol Toth

## 2018-10-24 ENCOUNTER — TELEPHONE (OUTPATIENT)
Dept: OBGYN CLINIC | Facility: CLINIC | Age: 43
End: 2018-10-24

## 2018-10-24 NOTE — TELEPHONE ENCOUNTER
----- Message from Saleem Dominguez CNM sent at 10/24/2018 10:04 AM CDT -----  Please call pt  Pathology confirmed polyp.

## 2018-10-29 ENCOUNTER — PATIENT MESSAGE (OUTPATIENT)
Dept: OBGYN CLINIC | Facility: CLINIC | Age: 43
End: 2018-10-29

## 2018-10-29 NOTE — TELEPHONE ENCOUNTER
Spoke with pt who reports on Saturday and Sunday she had vaginal bleeding. Pt states she was passing clots the biggest being \"just under a quarter\" in size. Pt was changing her pad twice a day and states bleeding has now stopped.  Pt denies abdominal pain

## 2018-10-31 ENCOUNTER — OFFICE VISIT (OUTPATIENT)
Dept: OBGYN CLINIC | Facility: CLINIC | Age: 43
End: 2018-10-31
Payer: COMMERCIAL

## 2018-10-31 VITALS
WEIGHT: 162 LBS | BODY MASS INDEX: 30 KG/M2 | SYSTOLIC BLOOD PRESSURE: 128 MMHG | DIASTOLIC BLOOD PRESSURE: 78 MMHG | HEART RATE: 85 BPM

## 2018-10-31 DIAGNOSIS — Z09 FOLLOW-UP EXAMINATION: Primary | ICD-10-CM

## 2018-10-31 PROCEDURE — 99213 OFFICE O/P EST LOW 20 MIN: CPT | Performed by: ADVANCED PRACTICE MIDWIFE

## 2018-10-31 RX ORDER — ZOLPIDEM TARTRATE 10 MG/1
5 TABLET ORAL
Refills: 1 | COMMUNITY
Start: 2018-10-26 | End: 2019-02-05

## 2018-10-31 NOTE — PROGRESS NOTES
HPI:    Patient ID: Koko Hampton is a 44year old female who presents for follow up after removal of cervical polyp. Pt reports bleeding stopped on Tuesday. Denies any pain or fevers.   Pt has not been sexually active  Has pelvic u/s scheduled for Satur person, place, and time. Skin: Skin is warm and dry. She is not diaphoretic. Psychiatric: She has a normal mood and affect.  Her behavior is normal. Judgment and thought content normal.              ASSESSMENT/PLAN:   Follow-up examination  (primary enc

## 2018-11-03 ENCOUNTER — HOSPITAL ENCOUNTER (OUTPATIENT)
Dept: ULTRASOUND IMAGING | Age: 43
Discharge: HOME | End: 2018-11-03
Attending: ADVANCED PRACTICE MIDWIFE
Payer: COMMERCIAL

## 2018-11-03 DIAGNOSIS — N95.0 POSTMENOPAUSAL BLEEDING: ICD-10-CM

## 2018-11-03 PROCEDURE — 76830 TRANSVAGINAL US NON-OB: CPT | Performed by: ADVANCED PRACTICE MIDWIFE

## 2018-11-03 PROCEDURE — 76856 US EXAM PELVIC COMPLETE: CPT | Performed by: ADVANCED PRACTICE MIDWIFE

## 2018-11-06 ENCOUNTER — TELEPHONE (OUTPATIENT)
Dept: OBGYN CLINIC | Facility: CLINIC | Age: 43
End: 2018-11-06

## 2018-11-06 NOTE — TELEPHONE ENCOUNTER
----- Message from Ibeth Rivera CNM sent at 11/5/2018  8:16 PM CST -----  Please call pt pelvic u/s was normal

## 2018-11-19 ENCOUNTER — TELEPHONE (OUTPATIENT)
Dept: OBGYN CLINIC | Facility: CLINIC | Age: 43
End: 2018-11-19

## 2018-12-06 ENCOUNTER — OFFICE VISIT (OUTPATIENT)
Dept: OTOLARYNGOLOGY | Facility: CLINIC | Age: 43
End: 2018-12-06
Payer: COMMERCIAL

## 2018-12-06 VITALS
SYSTOLIC BLOOD PRESSURE: 116 MMHG | DIASTOLIC BLOOD PRESSURE: 74 MMHG | BODY MASS INDEX: 29.81 KG/M2 | WEIGHT: 162 LBS | TEMPERATURE: 98 F | HEIGHT: 62 IN

## 2018-12-06 DIAGNOSIS — J34.2 DEVIATED NASAL SEPTUM: ICD-10-CM

## 2018-12-06 DIAGNOSIS — H61.23 BILATERAL IMPACTED CERUMEN: ICD-10-CM

## 2018-12-06 DIAGNOSIS — H93.11 RINGING IN EAR, RIGHT: Primary | ICD-10-CM

## 2018-12-06 PROCEDURE — 99212 OFFICE O/P EST SF 10 MIN: CPT | Performed by: OTOLARYNGOLOGY

## 2018-12-06 PROCEDURE — 99243 OFF/OP CNSLTJ NEW/EST LOW 30: CPT | Performed by: OTOLARYNGOLOGY

## 2018-12-06 PROCEDURE — 69210 REMOVE IMPACTED EAR WAX UNI: CPT | Performed by: OTOLARYNGOLOGY

## 2018-12-06 RX ORDER — MONTELUKAST SODIUM 10 MG/1
10 TABLET ORAL NIGHTLY
Qty: 30 TABLET | Refills: 3 | Status: SHIPPED | OUTPATIENT
Start: 2018-12-06 | End: 2019-06-13

## 2018-12-06 RX ORDER — AZELASTINE 1 MG/ML
2 SPRAY, METERED NASAL 2 TIMES DAILY
Qty: 1 BOTTLE | Refills: 0 | Status: SHIPPED | OUTPATIENT
Start: 2018-12-06 | End: 2019-06-13

## 2018-12-06 RX ORDER — ALPRAZOLAM 0.25 MG/1
TABLET ORAL DAILY
Refills: 0 | COMMUNITY
Start: 2018-10-26 | End: 2019-06-13

## 2018-12-06 NOTE — PROGRESS NOTES
Paul Hameed is a 44year old female.   Patient presents with:  Sinus Problem: sinus congestion, sinus pressure, clogged ears, itchy ears for 2 weeks  Ringing In Ear: ringing of right ears for 7 years      HISTORY OF PRESENT ILLNESS  History of ringing in DO at [facility] 70 / TRANSFORAMINAL EPIDURAL STEROID INJECTION N/A 9/24/2015    Performed by Consuelo Abraham DO at [facility]   • LUMBAR / TRANSFORAMINAL EPIDURAL STEROID INJECTION N/A 8/28/2015    Performed by Estrellita Castelan Submandibular. Anterior cervical. Posterior cervical. Supraclavicular.         Nose/Mouth/Throat Normal External nose - Normal. Lips/teeth/gums - Normal. Tonsils - Normal. Oropharynx - Normal.   Nose/Mouth/Throat Normal Nares - Right: Normal Left: Normal. S AUDIOLOGY    2. Deviated nasal septum  Start Singulair loratadine D and Astelin nasal spray. Return to see me in 1 month. If no better CT scan of sinuses    3.  Bilateral impacted cerumen  Ears cleared of all cerumen  - REMOVAL IMPACTED CERUMEN REQUIRING

## 2018-12-11 NOTE — PROGRESS NOTES
Fibromyalgia  -Goes to Pain clinic in [place] Q1mo for zohydro and norco along with trigger point injection. Sees TERESA Nunes  -On 10/2017 terminated from going to clinic due to high balance which patient is unable to pay at once.  They only provided N

## 2018-12-11 NOTE — CHRONIC PAIN
MEDICATION EVALUATION  HISTORY OF PRESENT ILLNESS:  Aguila Mathews is a 44year old old female with history of fibromyalgia, joint pain, lumbar radiculopathy who returns for medication evaluation. She takes [place] BID along with Advil 4 per day.  She also t Incontinence: as above  Coughing/sneezing/straining does not exacerbate the pain.   Numbness/tingling: as above  Weakness: as above  Weight Loss: Negative   Fever: Negative   Cardiovascular:  No current chest pain or palpitations   Respiratory:  No current Disorder Father    • Other (COPD) Father    • Breast Cancer Maternal Cousin    • Cancer Maternal Aunt         lung cancer       SOCIAL HISTORY:  Social History    Socioeconomic History      Marital status:       Spouse name: Not on file      Number to PCP- and consider functional medicine referral      - CPM with norco BID.  MME= 20     - Discussed with patient the risks of opioid medications including but not limited to dependence, addiction, respiratory depression, and death.  Patient advised not to

## 2018-12-19 ENCOUNTER — TELEPHONE (OUTPATIENT)
Dept: OBGYN CLINIC | Facility: CLINIC | Age: 43
End: 2018-12-19

## 2019-01-03 ENCOUNTER — OFFICE VISIT (OUTPATIENT)
Dept: AUDIOLOGY | Facility: CLINIC | Age: 44
End: 2019-01-03
Payer: COMMERCIAL

## 2019-01-03 ENCOUNTER — OFFICE VISIT (OUTPATIENT)
Dept: OTOLARYNGOLOGY | Facility: CLINIC | Age: 44
End: 2019-01-03
Payer: COMMERCIAL

## 2019-01-03 VITALS
WEIGHT: 155 LBS | DIASTOLIC BLOOD PRESSURE: 76 MMHG | HEIGHT: 61.6 IN | BODY MASS INDEX: 28.89 KG/M2 | SYSTOLIC BLOOD PRESSURE: 116 MMHG | TEMPERATURE: 98 F

## 2019-01-03 DIAGNOSIS — J34.2 DEVIATED NASAL SEPTUM: ICD-10-CM

## 2019-01-03 DIAGNOSIS — H93.11 RINGING IN EARS, RIGHT: Primary | ICD-10-CM

## 2019-01-03 DIAGNOSIS — H90.41 SENSORINEURAL HEARING LOSS (SNHL) OF RIGHT EAR WITH UNRESTRICTED HEARING OF LEFT EAR: Primary | ICD-10-CM

## 2019-01-03 PROCEDURE — 92557 COMPREHENSIVE HEARING TEST: CPT | Performed by: AUDIOLOGIST

## 2019-01-03 PROCEDURE — 99214 OFFICE O/P EST MOD 30 MIN: CPT | Performed by: OTOLARYNGOLOGY

## 2019-01-03 PROCEDURE — 92570 ACOUSTIC IMMITANCE TESTING: CPT | Performed by: AUDIOLOGIST

## 2019-01-03 PROCEDURE — 99212 OFFICE O/P EST SF 10 MIN: CPT | Performed by: OTOLARYNGOLOGY

## 2019-01-03 RX ORDER — TRAZODONE HYDROCHLORIDE 50 MG/1
50 TABLET ORAL NIGHTLY
COMMUNITY
End: 2020-11-06

## 2019-01-04 NOTE — PROGRESS NOTES
Mercedes Cesar is a 44year old female. Patient presents with:   Follow - Up: 1 month follow up- ringing in rught ear- for hearing test today  Follow - Up: 1 month follow up- deviated septum- per pt no changes/improvement of symptoms      HISTORY OF PRESEN Diabetes Maternal Grandmother    • Thyroid disease Other         Unlce and Aunt   • Heart Disorder Father    • Other (COPD) Father    • Breast Cancer Maternal Cousin    • Cancer Maternal Aunt         lung cancer       Past Medical History:   Diagnosis Date Normal Conjunctiva - Right: Normal, Left: Normal. Pupil - Right: Normal, Left: Normal. Fundus - Right: Normal, Left: Normal.   Neurological Normal Memory - Normal. Cranial nerves - Cranial nerves II through XII grossly intact.    Head/Face Normal Facial fea Tab, 200 mg.  , Disp: , Rfl: 0  •  magnesium oxide 400 MG Oral Tab, Take 400 mg by mouth daily. , Disp: , Rfl:   •  TURMERIC OR, Take 200 mg by mouth daily. , Disp: , Rfl:   •  LEVOTHYROXINE SODIUM 25 MCG Oral Tab, TAKE ONE TABLET BY MOUTH BEFORE BREAKFAST ,

## 2019-01-04 NOTE — PROGRESS NOTES
AUDIOLOGY REPORT      Ene Sanchez is a 44year old female     Referring Provider: Charlie Peterson   YOB: 1975  Medical Record: [id number]      Patient Hearing History:  Patient reported tinnitus in right ear for past 7 years.    She is aware

## 2019-01-05 ENCOUNTER — TELEPHONE (OUTPATIENT)
Dept: OTOLARYNGOLOGY | Facility: CLINIC | Age: 44
End: 2019-01-05

## 2019-01-07 ENCOUNTER — APPOINTMENT (OUTPATIENT)
Dept: GENERAL RADIOLOGY | Facility: HOSPITAL | Age: 44
End: 2019-01-07
Payer: COMMERCIAL

## 2019-01-07 ENCOUNTER — HOSPITAL ENCOUNTER (EMERGENCY)
Facility: HOSPITAL | Age: 44
Discharge: HOME | End: 2019-01-07
Payer: COMMERCIAL

## 2019-01-07 ENCOUNTER — HOSPITAL ENCOUNTER (OUTPATIENT)
Age: 44
Discharge: ANOTHER HOSPITAL | End: 2019-01-07
Attending: EMERGENCY MEDICINE
Payer: COMMERCIAL

## 2019-01-07 ENCOUNTER — APPOINTMENT (OUTPATIENT)
Dept: CT IMAGING | Facility: HOSPITAL | Age: 44
End: 2019-01-07
Attending: EMERGENCY MEDICINE
Payer: COMMERCIAL

## 2019-01-07 VITALS
HEIGHT: 62 IN | BODY MASS INDEX: 29.44 KG/M2 | HEART RATE: 84 BPM | WEIGHT: 160 LBS | TEMPERATURE: 99 F | OXYGEN SATURATION: 99 % | SYSTOLIC BLOOD PRESSURE: 107 MMHG | RESPIRATION RATE: 18 BRPM | DIASTOLIC BLOOD PRESSURE: 65 MMHG

## 2019-01-07 VITALS
OXYGEN SATURATION: 100 % | DIASTOLIC BLOOD PRESSURE: 70 MMHG | WEIGHT: 160 LBS | BODY MASS INDEX: 30 KG/M2 | RESPIRATION RATE: 18 BRPM | SYSTOLIC BLOOD PRESSURE: 131 MMHG | HEART RATE: 96 BPM | TEMPERATURE: 98 F

## 2019-01-07 DIAGNOSIS — R42 VERTIGO: Primary | ICD-10-CM

## 2019-01-07 DIAGNOSIS — R51.9 HEADACHE DISORDER: ICD-10-CM

## 2019-01-07 DIAGNOSIS — R51.9 INTRACTABLE HEADACHE, UNSPECIFIED CHRONICITY PATTERN, UNSPECIFIED HEADACHE TYPE: Primary | ICD-10-CM

## 2019-01-07 LAB
ANION GAP SERPL CALC-SCNC: 11 MMOL/L (ref 0–18)
BASOPHILS # BLD: 0.1 K/UL (ref 0–0.2)
BASOPHILS NFR BLD: 1 %
BUN SERPL-MCNC: 6 MG/DL (ref 8–20)
BUN/CREAT SERPL: 7.5 (ref 10–20)
CALCIUM SERPL-MCNC: 9.4 MG/DL (ref 8.5–10.5)
CHLORIDE SERPL-SCNC: 103 MMOL/L (ref 95–110)
CO2 SERPL-SCNC: 25 MMOL/L (ref 22–32)
CREAT SERPL-MCNC: 0.8 MG/DL (ref 0.5–1.5)
EOSINOPHIL # BLD: 0.1 K/UL (ref 0–0.7)
EOSINOPHIL NFR BLD: 1 %
ERYTHROCYTE [DISTWIDTH] IN BLOOD BY AUTOMATED COUNT: 13.7 % (ref 11–15)
GLUCOSE SERPL-MCNC: 114 MG/DL (ref 70–99)
HCT VFR BLD AUTO: 42.1 % (ref 35–48)
HGB BLD-MCNC: 13.9 G/DL (ref 12–16)
LYMPHOCYTES # BLD: 2.7 K/UL (ref 1–4)
LYMPHOCYTES NFR BLD: 22 %
MCH RBC QN AUTO: 26.2 PG (ref 27–32)
MCHC RBC AUTO-ENTMCNC: 32.9 G/DL (ref 32–37)
MCV RBC AUTO: 79.6 FL (ref 80–100)
MONOCYTES # BLD: 0.5 K/UL (ref 0–1)
MONOCYTES NFR BLD: 4 %
NEUTROPHILS # BLD AUTO: 9 K/UL (ref 1.8–7.7)
NEUTROPHILS NFR BLD: 73 %
OSMOLALITY UR CALC.SUM OF ELEC: 286 MOSM/KG (ref 275–295)
PLATELET # BLD AUTO: 308 K/UL (ref 140–400)
PMV BLD AUTO: 7.4 FL (ref 7.4–10.3)
POTASSIUM SERPL-SCNC: 4.1 MMOL/L (ref 3.3–5.1)
RBC # BLD AUTO: 5.29 M/UL (ref 3.7–5.4)
SODIUM SERPL-SCNC: 139 MMOL/L (ref 136–144)
WBC # BLD AUTO: 12.4 K/UL (ref 4–11)

## 2019-01-07 PROCEDURE — 70450 CT HEAD/BRAIN W/O DYE: CPT | Performed by: EMERGENCY MEDICINE

## 2019-01-07 PROCEDURE — 99213 OFFICE O/P EST LOW 20 MIN: CPT

## 2019-01-07 PROCEDURE — 96374 THER/PROPH/DIAG INJ IV PUSH: CPT

## 2019-01-07 PROCEDURE — 99284 EMERGENCY DEPT VISIT MOD MDM: CPT

## 2019-01-07 PROCEDURE — 80048 BASIC METABOLIC PNL TOTAL CA: CPT

## 2019-01-07 PROCEDURE — 85025 COMPLETE CBC W/AUTO DIFF WBC: CPT

## 2019-01-07 PROCEDURE — 96361 HYDRATE IV INFUSION ADD-ON: CPT

## 2019-01-07 PROCEDURE — 71046 X-RAY EXAM CHEST 2 VIEWS: CPT

## 2019-01-07 RX ORDER — MECLIZINE HYDROCHLORIDE 25 MG/1
25 TABLET ORAL 3 TIMES DAILY PRN
Qty: 20 TABLET | Refills: 0 | Status: SHIPPED | OUTPATIENT
Start: 2019-01-07 | End: 2019-06-13

## 2019-01-07 RX ORDER — MECLIZINE HYDROCHLORIDE 25 MG/1
25 TABLET ORAL ONCE
Status: COMPLETED | OUTPATIENT
Start: 2019-01-07 | End: 2019-01-07

## 2019-01-07 RX ORDER — ONDANSETRON 4 MG/1
4 TABLET, ORALLY DISINTEGRATING ORAL ONCE
Status: COMPLETED | OUTPATIENT
Start: 2019-01-07 | End: 2019-01-07

## 2019-01-07 RX ORDER — ONDANSETRON 4 MG/1
4 TABLET, ORALLY DISINTEGRATING ORAL EVERY 8 HOURS PRN
Qty: 10 TABLET | Refills: 0 | Status: SHIPPED | OUTPATIENT
Start: 2019-01-07 | End: 2019-01-14

## 2019-01-07 RX ORDER — KETOROLAC TROMETHAMINE 30 MG/ML
30 INJECTION, SOLUTION INTRAMUSCULAR; INTRAVENOUS ONCE
Status: COMPLETED | OUTPATIENT
Start: 2019-01-07 | End: 2019-01-07

## 2019-01-07 NOTE — ED INITIAL ASSESSMENT (HPI)
Pt c/o headache, dizziness, n/v since Saturday. Pt dx with bronchitis on pedro day. Productive cough.

## 2019-01-07 NOTE — ED NOTES
Migraine HA and n/v. Sent from [facility] and given Zofran which patient states alleviated her nausea.  HA remains

## 2019-01-07 NOTE — ED PROVIDER NOTES
Patient Seen in: [facility]    History   Patient presents with:  Dizziness (neurologic)    Stated Complaint: DIZZY    HPI    Patient is a 56-year-old female with a history of depression and fibromyalgias, Hashimoto's dise Pack years: 10        Types: Cigarettes      Smokeless tobacco: Never Used    Alcohol use: No      Alcohol/week: 0.0 oz    Drug use: No      Review of Systems    Positive for stated complaint: DIZZY  Other systems are as noted in HPI.   Constitutional Medication List as of 1/7/2019 10:22 AM

## 2019-01-07 NOTE — ED PROVIDER NOTES
Patient Seen in: [facility] AND [facility] Emergency Department    History   Patient presents with:  Headache (neurologic)    Stated Complaint: Dizziness, NV    HPI    44year old female with h/o fimromyalgia, hashimoto's thyroiditis, long-standing tinnitus of R noted above.     Physical Exam     ED Triage Vitals [01/07/19 1114]   /75   Pulse 90   Resp 18   Temp 98.5 °F (36.9 °C)   Temp src Oral   SpO2 98 %   O2 Device None (Room air)       Current:/63   Pulse 79   Temp 98.5 °F (36.9 °C) (Oral)   Resp 1 components within normal limits   CBC W/ DIFFERENTIAL - Abnormal; Notable for the following components:    WBC 12.4 (*)     MCV 79.6 (*)     MCH 26.2 (*)     Neutrophil Absolute 9.0 (*)     All other components within normal limits   CBC WITH DIFFERENTIAL Intravenous Given 1/7/19 1420)     Pt states sx resolved after medication. CT with no acute findings. Sx consistent with vertigo, pt advised of reasons to return.     Disposition and Plan     Clinical Impression:  Vertigo  (primary encounter diagnosis)  Tomy

## 2019-01-11 RX ORDER — LEVOTHYROXINE SODIUM 0.03 MG/1
TABLET ORAL
Qty: 30 TABLET | Refills: 0 | Status: SHIPPED | OUTPATIENT
Start: 2019-01-11 | End: 2019-07-12

## 2019-01-24 ENCOUNTER — TELEPHONE (OUTPATIENT)
Dept: OTOLARYNGOLOGY | Facility: CLINIC | Age: 44
End: 2019-01-24

## 2019-01-24 NOTE — TELEPHONE ENCOUNTER
Pt is post op day 1  septoplasty, SMR. Per  Pt pt is doing well no bleeding, advised pt no bending or heavy lifting for the next week and pt is not to blow nose until seen by J.  Advised pt she can start using OTC saline nasal spray daily, afrin up to 5 d

## 2019-01-31 ENCOUNTER — OFFICE VISIT (OUTPATIENT)
Dept: OTOLARYNGOLOGY | Facility: CLINIC | Age: 44
End: 2019-01-31
Payer: COMMERCIAL

## 2019-01-31 VITALS
SYSTOLIC BLOOD PRESSURE: 134 MMHG | HEART RATE: 80 BPM | BODY MASS INDEX: 29.27 KG/M2 | WEIGHT: 155 LBS | DIASTOLIC BLOOD PRESSURE: 81 MMHG | HEIGHT: 61 IN | TEMPERATURE: 97 F

## 2019-01-31 DIAGNOSIS — J34.2 DEVIATED NASAL SEPTUM: Primary | ICD-10-CM

## 2019-01-31 PROCEDURE — 99024 POSTOP FOLLOW-UP VISIT: CPT | Performed by: OTOLARYNGOLOGY

## 2019-01-31 PROCEDURE — 99212 OFFICE O/P EST SF 10 MIN: CPT | Performed by: OTOLARYNGOLOGY

## 2019-02-01 NOTE — PROGRESS NOTES
Missy Gayle is a 44year old female.   Patient presents with:  Post-Op: pt in for post-op doing well      HISTORY OF PRESENT ILLNESS  History of ringing in the ear on the right for 7 years. Bishop Duke having nasal congestion sinus pressure and clogged ears fo Colon Cancer Maternal Grandmother    • Diabetes Maternal Grandmother    • Thyroid disease Other         Unlce and Aunt   • Heart Disorder Father    • Other (COPD) Father    • Breast Cancer Maternal Cousin    • Cancer Maternal Aunt         lung cancer gland - Normal. Thyroid gland - Normal.   Eyes Normal Conjunctiva - Right: Normal, Left: Normal. Pupil - Right: Normal, Left: Normal. Fundus - Right: Normal, Left: Normal.   Neurological Normal Memory - Normal. Cranial nerves - Cranial nerves II through XI DULoxetine HCl 30 MG Oral Cap DR Particles, 20 mg.  , Disp: , Rfl: 0  •  BusPIRone HCl 15 MG Oral Tab, , Disp: , Rfl: 0  •  Lithium Carbonate 300 MG Oral Cap, 450 mg.  , Disp: , Rfl: 0  •  lamoTRIgine 25 MG Oral Tab, 200 mg.  , Disp: , Rfl: 0  •  magnesium

## 2019-02-05 ENCOUNTER — OFFICE VISIT (OUTPATIENT)
Dept: PAIN CLINIC | Facility: HOSPITAL | Age: 44
End: 2019-02-05
Attending: ANESTHESIOLOGY
Payer: COMMERCIAL

## 2019-02-05 VITALS
HEIGHT: 61.6 IN | SYSTOLIC BLOOD PRESSURE: 128 MMHG | RESPIRATION RATE: 18 BRPM | BODY MASS INDEX: 28.89 KG/M2 | HEART RATE: 88 BPM | DIASTOLIC BLOOD PRESSURE: 78 MMHG | WEIGHT: 155 LBS

## 2019-02-05 DIAGNOSIS — M47.817 LUMBOSACRAL SPONDYLOSIS WITHOUT MYELOPATHY: ICD-10-CM

## 2019-02-05 DIAGNOSIS — M51.27 HERNIATED NUCLEUS PULPOSUS, L5-S1: Primary | ICD-10-CM

## 2019-02-05 PROCEDURE — 99211 OFF/OP EST MAY X REQ PHY/QHP: CPT

## 2019-02-05 RX ORDER — HYDROCODONE BITARTRATE AND ACETAMINOPHEN 10; 325 MG/1; MG/1
1 TABLET ORAL 2 TIMES DAILY PRN
Qty: 60 TABLET | Refills: 0 | Status: SHIPPED | OUTPATIENT
Start: 2019-02-15 | End: 2019-03-17

## 2019-02-05 RX ORDER — HYDROCODONE BITARTRATE AND ACETAMINOPHEN 10; 325 MG/1; MG/1
1 TABLET ORAL EVERY 12 HOURS
Qty: 60 TABLET | Refills: 0 | Status: SHIPPED | OUTPATIENT
Start: 2019-03-17 | End: 2019-04-15

## 2019-02-05 NOTE — PROGRESS NOTES
Fibromyalgia  -Goes to Pain clinic in [place] Q1mo for zohydro and norco along with trigger point injection. Sees TERESA Pichardo  -On 10/2017 terminated from going to clinic due to high balance which patient is unable to pay at once.  They only provided N

## 2019-02-05 NOTE — CHRONIC PAIN
MEDICATION EVALUATION  HISTORY OF PRESENT ILLNESS:  Ibis Montero is a 44year old old female with history of fibromyalgia, joint pain, lumbar radiculopathy who returns for medication evaluation. She takes [place] BID along with Advil 4 per day.  She also t oxide 400 MG Oral Tab Take 400 mg by mouth daily. Disp:  Rfl:    TURMERIC OR Take 200 mg by mouth daily. Disp:  Rfl:    Selenium 200 MCG Oral Tab Take by mouth.  Disp:  Rfl:    PATIENT SUPPLIED MEDICATION  Disp:  Rfl:    Cholecalciferol (VITAMIN D) 2000 uni FAMILY HISTORY:  Family History   Problem Relation Age of Onset   • Thyroid disease Mother         Goiter benjie   • Heart Disorder Mother    • Heart Disease Mother    • Thyroid disease Maternal Grandmother    • Colon Cancer Maternal Grandmother    • female, with  History of Herniated nucleus pulposus, L5-S1  (primary encounter diagnosis)  Lumbosacral spondylosis without myelopathy. She has been maintained with Norco twice per day. Continue her meds      - CPM with norco BID.  MME= 20     - Discussed

## 2019-02-18 RX ORDER — LEVOTHYROXINE SODIUM 0.03 MG/1
TABLET ORAL
Qty: 30 TABLET | Refills: 1 | Status: SHIPPED | OUTPATIENT
Start: 2019-02-18 | End: 2019-04-14

## 2019-02-18 NOTE — TELEPHONE ENCOUNTER
LOV 4/6/2018. RTC in 1 year. Refilled through yearly follow up date per [facility] protocol.

## 2019-02-19 ENCOUNTER — TELEPHONE (OUTPATIENT)
Dept: OBGYN CLINIC | Facility: CLINIC | Age: 44
End: 2019-02-19

## 2019-04-15 RX ORDER — LEVOTHYROXINE SODIUM 0.03 MG/1
TABLET ORAL
Qty: 30 TABLET | Refills: 0 | Status: SHIPPED | OUTPATIENT
Start: 2019-04-15 | End: 2019-05-15

## 2019-04-16 ENCOUNTER — OFFICE VISIT (OUTPATIENT)
Dept: PAIN CLINIC | Facility: HOSPITAL | Age: 44
End: 2019-04-16
Attending: NURSE PRACTITIONER
Payer: COMMERCIAL

## 2019-04-16 PROCEDURE — 99211 OFF/OP EST MAY X REQ PHY/QHP: CPT

## 2019-04-16 RX ORDER — HYDROCODONE BITARTRATE AND ACETAMINOPHEN 10; 325 MG/1; MG/1
1 TABLET ORAL EVERY 12 HOURS
Qty: 60 TABLET | Refills: 0 | Status: SHIPPED | OUTPATIENT
Start: 2019-05-15 | End: 2019-06-07

## 2019-04-16 RX ORDER — HYDROCODONE BITARTRATE AND ACETAMINOPHEN 10; 325 MG/1; MG/1
1 TABLET ORAL EVERY 12 HOURS
Qty: 60 TABLET | Refills: 0 | Status: SHIPPED | OUTPATIENT
Start: 2019-04-16 | End: 2019-05-16

## 2019-04-16 NOTE — CHRONIC PAIN
MEDICATION EVALUATION  HISTORY OF PRESENT ILLNESS:  Douglas Bello is a 44year old old female with history of fibromyalgia, joint pain, lumbar radiculopathy who returns for medication evaluation.   She continues to report generalized back pain that does no Oral Tablet 12 Hr Take 1 tablet by mouth every 12 (twelve) hours. Disp: 60 tablet Rfl: 3   Azelastine HCl 0.1 % Nasal Solution 2 sprays by Nasal route 2 (two) times daily.  Disp: 1 Bottle Rfl: 0   DULoxetine HCl 30 MG Oral Cap DR Particles 15 mg 2 (two) dolores Laterality Date   • BACK SURGERY  12/29/15    L5-S1 ALIF    • LUMBAR / TRANSFORAMINAL EPIDURAL STEROID INJECTION N/A 11/5/2015    Performed by Marcelo Sutherland DO at [place] [address] / TRANSFORAMINAL EPIDURAL STEROID INJECTION N/A 9/24/ Talks on phone: Not on file        Gets together: Not on file        Attends Voodoo service: Not on file        Active member of club or organization: Not on file        Attends meetings of clubs or organizations: Not on file        Relationship statu

## 2019-04-16 NOTE — PROGRESS NOTES
Patient presents to [facility] ambulatory for med eval.  She has chronic back pain neck and shoulder pain and hip pain. She takes norco bid and it helps but it\" doesn't last as long anymore\".   She takes one at 9am and the other at 3pm.  ZOHRA Mcdonald in to see jayne

## 2019-05-15 RX ORDER — LEVOTHYROXINE SODIUM 0.03 MG/1
TABLET ORAL
Qty: 30 TABLET | Refills: 0 | Status: SHIPPED | OUTPATIENT
Start: 2019-05-15 | End: 2019-06-10

## 2019-05-15 NOTE — TELEPHONE ENCOUNTER
LOV 4/6/18 with RTC 6 months    Sent letter on [facility] asking patient to schedule follow up visit. Pended 1 mo refill.

## 2019-06-10 RX ORDER — LEVOTHYROXINE SODIUM 0.03 MG/1
TABLET ORAL
Qty: 30 TABLET | Refills: 0 | Status: SHIPPED | OUTPATIENT
Start: 2019-06-10 | End: 2019-06-13

## 2019-06-13 ENCOUNTER — OFFICE VISIT (OUTPATIENT)
Dept: FAMILY MEDICINE CLINIC | Facility: CLINIC | Age: 44
End: 2019-06-13
Payer: COMMERCIAL

## 2019-06-13 VITALS
OXYGEN SATURATION: 96 % | WEIGHT: 157 LBS | HEIGHT: 61 IN | HEART RATE: 110 BPM | BODY MASS INDEX: 29.64 KG/M2 | TEMPERATURE: 100 F | SYSTOLIC BLOOD PRESSURE: 110 MMHG | DIASTOLIC BLOOD PRESSURE: 60 MMHG

## 2019-06-13 DIAGNOSIS — M79.7 FIBROMYALGIA: ICD-10-CM

## 2019-06-13 DIAGNOSIS — K52.9 CHRONIC DIARRHEA: ICD-10-CM

## 2019-06-13 DIAGNOSIS — R50.9 FEVER, UNSPECIFIED FEVER CAUSE: Primary | ICD-10-CM

## 2019-06-13 DIAGNOSIS — G89.4 CHRONIC PAIN DISORDER: ICD-10-CM

## 2019-06-13 PROCEDURE — 85027 COMPLETE CBC AUTOMATED: CPT | Performed by: FAMILY MEDICINE

## 2019-06-13 PROCEDURE — 80053 COMPREHEN METABOLIC PANEL: CPT | Performed by: FAMILY MEDICINE

## 2019-06-13 PROCEDURE — 99214 OFFICE O/P EST MOD 30 MIN: CPT | Performed by: FAMILY MEDICINE

## 2019-06-13 PROCEDURE — 81003 URINALYSIS AUTO W/O SCOPE: CPT | Performed by: FAMILY MEDICINE

## 2019-06-13 PROCEDURE — 87389 HIV-1 AG W/HIV-1&-2 AB AG IA: CPT | Performed by: FAMILY MEDICINE

## 2019-06-13 RX ORDER — LAMOTRIGINE 100 MG/1
100 TABLET ORAL DAILY
COMMUNITY
Start: 2019-06-10

## 2019-06-13 NOTE — PROGRESS NOTES
HPI:   Patient presents with:  Fever: c/c low grade fever x11 days pt did not check temperature      Paul Hameed is a 44year old female presenting for:    Fevers  -for past 11 days ranging from 100.4-100.8  -fevers have been flaring fibromyalgia  -fe Spec Gravity 1.009 1.002 - 1.035    pH Urine 6.0 5.0 - 8.0    Protein Urine Negative Negative mg/dL    Glucose Urine Negative Negative mg/dL    Ketones Urine Negative Negative mg/dL    Bilirubin Urine Negative Negative    Blood Urine Negative Negative    N lamoTRIgine 25 MG Oral Tab 200 mg. Disp:  Rfl: 0   Cholecalciferol (VITAMIN D) 2000 units Oral Cap Take 4,000 Units by mouth. Disp:  Rfl:    [START ON 7/14/2019] HYDROcodone-acetaminophen  MG Oral Tab Take 1 tablet by mouth Q12H.  Disp: 60 table Constitutional: Positive for fever. Negative for chills and fatigue. Respiratory: Negative for cough and shortness of breath. Cardiovascular: Negative for chest pain, palpitations and leg swelling. Gastrointestinal: Positive for diarrhea.  Negative possible IBS  - GI STOOL PANEL BY PCR; Future    3. Fibromyalgia  4. Chronic pain disorder  -mild flare up  -CPM    Follow-up in 2wk  Patient indicates understanding of the above recommendations and agrees to the above plan.   Reasurrance and education prov

## 2019-06-14 ENCOUNTER — APPOINTMENT (OUTPATIENT)
Dept: GENERAL RADIOLOGY | Age: 44
End: 2019-06-14
Attending: EMERGENCY MEDICINE
Payer: COMMERCIAL

## 2019-06-14 ENCOUNTER — HOSPITAL ENCOUNTER (OUTPATIENT)
Age: 44
Discharge: HOME | End: 2019-06-14
Attending: EMERGENCY MEDICINE
Payer: COMMERCIAL

## 2019-06-14 ENCOUNTER — OFFICE VISIT (OUTPATIENT)
Dept: PAIN CLINIC | Facility: HOSPITAL | Age: 44
End: 2019-06-14
Attending: NURSE PRACTITIONER
Payer: COMMERCIAL

## 2019-06-14 ENCOUNTER — APPOINTMENT (OUTPATIENT)
Dept: LAB | Age: 44
End: 2019-06-14
Attending: NURSE PRACTITIONER
Payer: COMMERCIAL

## 2019-06-14 VITALS
HEART RATE: 106 BPM | SYSTOLIC BLOOD PRESSURE: 146 MMHG | BODY MASS INDEX: 28.89 KG/M2 | WEIGHT: 155 LBS | RESPIRATION RATE: 18 BRPM | HEIGHT: 61.6 IN | DIASTOLIC BLOOD PRESSURE: 81 MMHG

## 2019-06-14 VITALS
HEIGHT: 62 IN | RESPIRATION RATE: 18 BRPM | WEIGHT: 157 LBS | SYSTOLIC BLOOD PRESSURE: 117 MMHG | TEMPERATURE: 99 F | OXYGEN SATURATION: 99 % | HEART RATE: 97 BPM | BODY MASS INDEX: 28.89 KG/M2 | DIASTOLIC BLOOD PRESSURE: 49 MMHG

## 2019-06-14 DIAGNOSIS — R11.2 NAUSEA AND VOMITING, INTRACTABILITY OF VOMITING NOT SPECIFIED, UNSPECIFIED VOMITING TYPE: Primary | ICD-10-CM

## 2019-06-14 DIAGNOSIS — M79.7 FIBROMYALGIA: Primary | ICD-10-CM

## 2019-06-14 DIAGNOSIS — M47.817 LUMBOSACRAL SPONDYLOSIS WITHOUT MYELOPATHY: ICD-10-CM

## 2019-06-14 PROCEDURE — 99214 OFFICE O/P EST MOD 30 MIN: CPT

## 2019-06-14 PROCEDURE — 99211 OFF/OP EST MAY X REQ PHY/QHP: CPT

## 2019-06-14 PROCEDURE — 96360 HYDRATION IV INFUSION INIT: CPT

## 2019-06-14 PROCEDURE — 71046 X-RAY EXAM CHEST 2 VIEWS: CPT | Performed by: EMERGENCY MEDICINE

## 2019-06-14 RX ORDER — SODIUM CHLORIDE 9 MG/ML
1000 INJECTION, SOLUTION INTRAVENOUS ONCE
Status: COMPLETED | OUTPATIENT
Start: 2019-06-14 | End: 2019-06-14

## 2019-06-14 RX ORDER — HYDROCODONE BITARTRATE AND ACETAMINOPHEN 10; 325 MG/1; MG/1
1 TABLET ORAL EVERY 12 HOURS PRN
Qty: 60 TABLET | Refills: 0 | Status: SHIPPED | OUTPATIENT
Start: 2019-06-14 | End: 2019-07-12

## 2019-06-14 RX ORDER — HYDROCODONE BITARTRATE AND ACETAMINOPHEN 10; 325 MG/1; MG/1
1 TABLET ORAL EVERY 12 HOURS
Qty: 60 TABLET | Refills: 0 | Status: SHIPPED | OUTPATIENT
Start: 2019-07-14 | End: 2019-08-13

## 2019-06-14 NOTE — PROGRESS NOTES
Fibromyalgia  -Goes to Pain clinic in [place] Q1mo for zohydro and norco along with trigger point injection. Sees TERESA Nunes  -On 10/2017 terminated from going to clinic due to high balance which patient is unable to pay at once.  They only provided N

## 2019-06-14 NOTE — ED PROVIDER NOTES
[facility]    History   Patient presents with:  Fever (infectious)    Stated Complaint: vomiting blood      HPI    This patient complains of nausea and vomiting.   Patient has not felt well for the past 12 No      Review of Systems    Positive for stated complaint: vomiting blood   Other systems are as noted in HPI.   Constitutional no fever currently   Eyes no drainage  ENT no sore throat  Respiratory positive  cough  Cardiac no chest pain  GI no abdominal p vomiting not specified, unspecified vomiting type  (primary encounter diagnosis)    Disposition:  Discharge  6/14/2019  9:26 am    Follow-up:  Chandan Kiser, [address] [facility] [phone]    In 5 days  for evaluat

## 2019-06-14 NOTE — ED NOTES
Attempted to give a stool sample, unable. IV started and taken to Xray. PO Fluids and warm blanket given.

## 2019-06-14 NOTE — ED INITIAL ASSESSMENT (HPI)
11 days of low grade temp, and diarrhea. pt has seen her PMD, labs drawn and urine sent. Vomited today at 645am, bile with blood per pt. Vomiting in triage clear fluid.  Blood was a small amount this am.

## 2019-06-14 NOTE — CHRONIC PAIN
MEDICATION EVALUATION  HISTORY OF PRESENT ILLNESS:  Ene Sanchez is a 44year old old female with history of fibromyalgia, joint pain, lumbar radiculopathy who returns for medication evaluation.   She continues to report generalized back pain that does no does not exacerbate the pain.   Numbness/tingling: as above  Weakness: as above  Weight Loss: Negative   Fever: Negative   Cardiovascular:  No current chest pain or palpitations   Respiratory:  No current shortness of breath   Gastrointestinal:  No active u (COPD) Father    • Breast Cancer Maternal Cousin    • Cancer Maternal Aunt         lung cancer       SOCIAL HISTORY:  Social History    Socioeconomic History      Marital status:       Spouse name: Not on file      Number of children: Not on file Normal  Edema - Absent  Skin - normal     IMAGING:  No new relevant studies     [place] PHYSICIAN MONITORING PROGRAM REVIEWED  Yes      ASSESSMENT AND PLAN:    Nikos Deshpande is a 44year old  female, with  History of Fibromyalgia  (primary encounter diagnosis)

## 2019-06-15 DIAGNOSIS — R19.7 DIARRHEA: Primary | ICD-10-CM

## 2019-06-20 ENCOUNTER — OFFICE VISIT (OUTPATIENT)
Dept: FAMILY MEDICINE CLINIC | Facility: CLINIC | Age: 44
End: 2019-06-20
Payer: COMMERCIAL

## 2019-06-20 VITALS
WEIGHT: 158 LBS | DIASTOLIC BLOOD PRESSURE: 84 MMHG | HEART RATE: 100 BPM | HEIGHT: 61.6 IN | SYSTOLIC BLOOD PRESSURE: 122 MMHG | BODY MASS INDEX: 29.45 KG/M2 | TEMPERATURE: 99 F | OXYGEN SATURATION: 99 %

## 2019-06-20 DIAGNOSIS — R74.01 TRANSAMINITIS: ICD-10-CM

## 2019-06-20 DIAGNOSIS — G89.4 CHRONIC PAIN DISORDER: ICD-10-CM

## 2019-06-20 DIAGNOSIS — R50.9 FEVER OF UNKNOWN ORIGIN: Primary | ICD-10-CM

## 2019-06-20 DIAGNOSIS — K52.9 CHRONIC DIARRHEA: ICD-10-CM

## 2019-06-20 DIAGNOSIS — M79.7 FIBROMYALGIA: ICD-10-CM

## 2019-06-20 PROCEDURE — 81025 URINE PREGNANCY TEST: CPT | Performed by: FAMILY MEDICINE

## 2019-06-20 PROCEDURE — 86140 C-REACTIVE PROTEIN: CPT | Performed by: FAMILY MEDICINE

## 2019-06-20 PROCEDURE — 86235 NUCLEAR ANTIGEN ANTIBODY: CPT | Performed by: FAMILY MEDICINE

## 2019-06-20 PROCEDURE — 86039 ANTINUCLEAR ANTIBODIES (ANA): CPT | Performed by: FAMILY MEDICINE

## 2019-06-20 PROCEDURE — 86704 HEP B CORE ANTIBODY TOTAL: CPT | Performed by: FAMILY MEDICINE

## 2019-06-20 PROCEDURE — 86706 HEP B SURFACE ANTIBODY: CPT | Performed by: FAMILY MEDICINE

## 2019-06-20 PROCEDURE — 99214 OFFICE O/P EST MOD 30 MIN: CPT | Performed by: FAMILY MEDICINE

## 2019-06-20 PROCEDURE — 86038 ANTINUCLEAR ANTIBODIES: CPT | Performed by: FAMILY MEDICINE

## 2019-06-20 PROCEDURE — 86431 RHEUMATOID FACTOR QUANT: CPT | Performed by: FAMILY MEDICINE

## 2019-06-20 PROCEDURE — 86803 HEPATITIS C AB TEST: CPT | Performed by: FAMILY MEDICINE

## 2019-06-20 PROCEDURE — 86225 DNA ANTIBODY NATIVE: CPT | Performed by: FAMILY MEDICINE

## 2019-06-20 PROCEDURE — 86480 TB TEST CELL IMMUN MEASURE: CPT | Performed by: FAMILY MEDICINE

## 2019-06-20 PROCEDURE — 87340 HEPATITIS B SURFACE AG IA: CPT | Performed by: FAMILY MEDICINE

## 2019-06-20 PROCEDURE — 82550 ASSAY OF CK (CPK): CPT | Performed by: FAMILY MEDICINE

## 2019-06-20 PROCEDURE — 85652 RBC SED RATE AUTOMATED: CPT | Performed by: FAMILY MEDICINE

## 2019-06-20 PROCEDURE — 86708 HEPATITIS A ANTIBODY: CPT | Performed by: FAMILY MEDICINE

## 2019-07-01 NOTE — PROGRESS NOTES
HPI:   Patient presents with:  Fever: follow up on fever      Melissa Mobley is a 44year old female presenting for:    Fevers  -for past 2wk days   -per log book; all are in evening/nightime hrs from 100.5-101. 3.  None at daytime  -feeling increasingly f <80    Reviewed By: TJ Me[employer]ia [employer], ANTI-SS-A/-SS-B   Result Value Ref Range    Anti-Sjogren's A Negative Negative    Anti-Sjogren's B Negative Negative   ANTIEXTRACTABLE NUCLEAR AG   Result Value Ref Range    Anti-Smith Antibody Negati units Oral Cap Take 4,000 Units by mouth.    Disp:  Rfl:       Past Medical History:   Diagnosis Date   • Anxiety state    • Back problem    • Depression    • Fibromyalgia     DX 2015   • Fibromyalgia    • Hashimoto's disease    • Migraines    • Neck pain arthralgias and myalgias. Neurological: Negative for headaches.           PHYSICAL EXAM:   /84   Pulse 100   Temp 98.8 °F (37.1 °C)   Ht 61.6\"   Wt 158 lb   SpO2 99%   BMI 29.28 kg/m²  Estimated body mass index is 29.28 kg/m² as calculated from the above    3. Fibromyalgia  4. Chronic pain disorder  -mild flare up  -CPM    Follow-up in 1mo or PRN pending results  Patient indicates understanding of the above recommendations and agrees to the above plan. Reasurrance and education provided.  All questio

## 2019-07-10 ENCOUNTER — HOSPITAL ENCOUNTER (OUTPATIENT)
Dept: ULTRASOUND IMAGING | Age: 44
Discharge: HOME | End: 2019-07-10
Attending: FAMILY MEDICINE
Payer: COMMERCIAL

## 2019-07-10 DIAGNOSIS — R74.01 TRANSAMINITIS: ICD-10-CM

## 2019-07-10 PROCEDURE — 76705 ECHO EXAM OF ABDOMEN: CPT | Performed by: FAMILY MEDICINE

## 2019-07-10 RX ORDER — LEVOTHYROXINE SODIUM 0.03 MG/1
TABLET ORAL
Qty: 30 TABLET | Refills: 0 | Status: SHIPPED | OUTPATIENT
Start: 2019-07-10 | End: 2019-08-07

## 2019-07-12 ENCOUNTER — OFFICE VISIT (OUTPATIENT)
Dept: FAMILY MEDICINE CLINIC | Facility: CLINIC | Age: 44
End: 2019-07-12
Payer: COMMERCIAL

## 2019-07-12 VITALS
WEIGHT: 150 LBS | HEART RATE: 88 BPM | SYSTOLIC BLOOD PRESSURE: 120 MMHG | HEIGHT: 61.6 IN | DIASTOLIC BLOOD PRESSURE: 78 MMHG | BODY MASS INDEX: 27.96 KG/M2 | OXYGEN SATURATION: 97 %

## 2019-07-12 DIAGNOSIS — R74.01 TRANSAMINITIS: ICD-10-CM

## 2019-07-12 DIAGNOSIS — E06.3 HASHIMOTO'S THYROIDITIS: ICD-10-CM

## 2019-07-12 DIAGNOSIS — K76.0 HEPATIC STEATOSIS: ICD-10-CM

## 2019-07-12 DIAGNOSIS — Z00.00 HEALTHCARE MAINTENANCE: ICD-10-CM

## 2019-07-12 DIAGNOSIS — R76.8 POSITIVE ANA (ANTINUCLEAR ANTIBODY): Primary | ICD-10-CM

## 2019-07-12 PROCEDURE — 99214 OFFICE O/P EST MOD 30 MIN: CPT | Performed by: FAMILY MEDICINE

## 2019-07-25 ENCOUNTER — OFFICE VISIT (OUTPATIENT)
Dept: RHEUMATOLOGY | Facility: CLINIC | Age: 44
End: 2019-07-25
Payer: COMMERCIAL

## 2019-07-25 VITALS
DIASTOLIC BLOOD PRESSURE: 70 MMHG | BODY MASS INDEX: 27.96 KG/M2 | HEIGHT: 61.6 IN | SYSTOLIC BLOOD PRESSURE: 106 MMHG | HEART RATE: 85 BPM | WEIGHT: 150 LBS

## 2019-07-25 DIAGNOSIS — R76.8 POSITIVE ANA (ANTINUCLEAR ANTIBODY): Primary | ICD-10-CM

## 2019-07-25 DIAGNOSIS — M79.7 FIBROMYALGIA: ICD-10-CM

## 2019-07-25 DIAGNOSIS — R76.8 DS DNA ANTIBODY POSITIVE: ICD-10-CM

## 2019-07-25 PROCEDURE — 99244 OFF/OP CNSLTJ NEW/EST MOD 40: CPT | Performed by: INTERNAL MEDICINE

## 2019-07-25 RX ORDER — CLONIDINE HYDROCHLORIDE 0.1 MG/1
0.1 TABLET ORAL DAILY
COMMUNITY
Start: 2019-07-19 | End: 2021-12-03

## 2019-07-25 RX ORDER — DULOXETIN HYDROCHLORIDE 20 MG/1
20 CAPSULE, DELAYED RELEASE ORAL DAILY
COMMUNITY
Start: 2019-07-17 | End: 2021-12-03

## 2019-07-25 RX ORDER — CYCLOBENZAPRINE HCL 5 MG
5 TABLET ORAL NIGHTLY PRN
Qty: 30 TABLET | Refills: 1 | Status: SHIPPED | OUTPATIENT
Start: 2019-07-25 | End: 2019-09-05

## 2019-07-25 RX ORDER — HYDROXYCHLOROQUINE SULFATE 200 MG/1
200 TABLET, FILM COATED ORAL 2 TIMES DAILY
Qty: 60 TABLET | Refills: 1 | Status: SHIPPED | OUTPATIENT
Start: 2019-07-25 | End: 2019-09-05

## 2019-07-25 RX ORDER — TOPIRAMATE 50 MG/1
100 TABLET, FILM COATED ORAL NIGHTLY
COMMUNITY
Start: 2019-07-19

## 2019-07-25 NOTE — PATIENT INSTRUCTIONS
1. Start hydroxychlroquine 200mg twice a day -   2. Return to clinic in 6 weeks. 3. Try cyclobenzaprien 5mg at night. Hydroxychloroquine tablets  Brand Names: Plaquenil, Quineprox  What is this medicine?   HYDROXYCHLOROQUINE (amanda drox ee Rosemary triana) i weak or tired; yellowing of the eyes or skin  · signs and symptoms of low blood sugar such as feeling anxious; confusion; dizziness; increased hunger; unusually weak or tired; sweating; shakiness; cold; irritable; headache; blurred vision; fast heartbeat; irregular heartbeat  · if you often drink alcohol  · kidney disease  · liver disease  · porphyria  · psoriasis  · seizures  · an unusual or allergic reaction to chloroquine, hydroxychloroquine, other medicines, foods, dyes, or preservatives  · pregnant or

## 2019-07-25 NOTE — PROGRESS NOTES
Froilan Miller is a 38year old female who presents for Patient presents with:  Fibromyalgia Syndrome  Muscle Pain  . HPI:     I had the pleasure of seeing Froilan Miller on 7/25/2019 for evaluation. She is referred by Dr. Jacek Bell.      She is joan martini buttocks also hurts. The hips come and go. She has swelling in her hands at times. Her hands swell sometimes but not all the time. Her rings are tight.        Wt Readings from Last 2 Encounters:  07/25/19 : 150 lb (68 kg)  07/12/19 : 150 lb (68 kg) TRANSFORAMINAL EPIDURAL STEROID INJECTION N/A 9/24/2015    Performed by Lobito Holt DO at [facility] / TRANSFORAMINAL EPIDURAL STEROID INJECTION N/A 8/28/2015    Performed by Lobito Holt DO at [address] [place] [address] menopausal right now - for a few years ago  Neuro: occl numbness or tingling in hands, and toes, , used to have bad migraines - but now much better, occl headache, no hx of seizures,   Psych:  hx of anxiety or depression - on lithium - and dulxoetine 20mg Negative Negative   RHEUMATOID FACTOR      <15 IU/mL <10   SED RATE      0 - 20 mm/Hr 18   C-REACTIVE PROTEIN      <0.30 mg/dL 0.92 (H)   CK      26 - 192 U/L 31   RICHARD SCREEN WITH REFLEX (S)      Negative Positive (A)   Anti Double Strand DNA      <10 40 ( over face - dx with rosacea with derm clinic n 2014 -   May want to get another opinoin given now that rash has been persistent and newly positive RICHARD   D/w her that this is not clear if she has a CTD but b/c of labs and nonspeicfic sympotms of rash, fever

## 2019-07-26 NOTE — PROGRESS NOTES
HPI:   Patient presents with:  Test Results: lab and US results       Cara Hong is a 38year old female presenting for:  Fevers resolved  Feeling back at baseline  Here to discuss results. + RICHARD.  Has chronic althralgias but has fibromyalgia     Resu Negative Negative   ANTIEXTRACTABLE NUCLEAR AG   Result Value Ref Range    Anti-Smith Antibody Negative Negative    Anti-Smith/RNP Antibody Negative Negative       Labs:   No results found for: A1C   Lab Results   Component Value Date/Time    CHOLEST 161 0 Take 100 mg by mouth nightly. Take 2 tablets at night. Disp:  Rfl:    Omega-3 Fatty Acids (FISH OIL OR) Take 1 tablet by mouth daily. Disp:  Rfl:    Hydroxychloroquine Sulfate 200 MG Oral Tab Take 1 tablet (200 mg total) by mouth 2 (two) times daily.  Disp: Review of Systems   Constitutional: Negative for chills, fatigue and fever. Eyes: Negative for visual disturbance. Respiratory: Negative for cough and shortness of breath. Cardiovascular: Negative for chest pain, palpitations and leg swelling.    G maintenance  -     HEMOGLOBIN A1C; Future           Return in about 3 months (around 10/12/2019) for follow-up. Patient indicates understanding of the above recommendations and agrees to the above plan. Reasurrance and education provided.  All questions a

## 2019-08-05 ENCOUNTER — OFFICE VISIT (OUTPATIENT)
Dept: ENDOCRINOLOGY CLINIC | Facility: CLINIC | Age: 44
End: 2019-08-05
Payer: COMMERCIAL

## 2019-08-05 VITALS
SYSTOLIC BLOOD PRESSURE: 122 MMHG | WEIGHT: 146 LBS | HEART RATE: 76 BPM | BODY MASS INDEX: 27 KG/M2 | DIASTOLIC BLOOD PRESSURE: 78 MMHG

## 2019-08-05 DIAGNOSIS — E06.3 HYPOTHYROIDISM DUE TO HASHIMOTO'S THYROIDITIS: Primary | ICD-10-CM

## 2019-08-05 DIAGNOSIS — E03.8 HYPOTHYROIDISM DUE TO HASHIMOTO'S THYROIDITIS: Primary | ICD-10-CM

## 2019-08-05 PROCEDURE — 99213 OFFICE O/P EST LOW 20 MIN: CPT | Performed by: INTERNAL MEDICINE

## 2019-08-05 NOTE — PROGRESS NOTES
Name: Dayanara Lemos  Date: 8/5/2019    Referring Physician: No ref. provider found    Patient presents with: Follow - Up: Pt here due to Hashimoto's.       HISTORY OF PRESENT ILLNESS   Dayanara Lemos is a 38year old female who presents for Patient presen shortness of breath, wheezing or SIMON  Cardiovascular:  no chest pain or palpitations  Gastrointestinal:  no abdominal pain, bowel movement problems  Musculoskeletal: no muscle pain or arthralgia  /Gyne: no frequency or discomfort while urinating  Psychia Social History    Socioeconomic History      Marital status:       Spouse name: Not on file      Number of children: Not on file      Years of education: Not on file      Highest education level: Not on file    Tobacco Use      Smoking status: Cur moisture and skin texture  Hair & Nails:  normal scalp hair     Neuro:  sensory grossly intact and motor grossly intact  Psychiatric:  oriented to time, self, and place  Nutritional:  no abnormal weight gain or loss    ASSESSMENT/PLAN:    1.  Hashimoto's Th

## 2019-08-07 RX ORDER — LEVOTHYROXINE SODIUM 0.03 MG/1
TABLET ORAL
Qty: 30 TABLET | Refills: 3 | Status: SHIPPED | OUTPATIENT
Start: 2019-08-07 | End: 2019-11-30

## 2019-08-22 ENCOUNTER — OFFICE VISIT (OUTPATIENT)
Dept: PAIN CLINIC | Facility: HOSPITAL | Age: 44
End: 2019-08-22
Attending: NURSE PRACTITIONER
Payer: COMMERCIAL

## 2019-08-22 DIAGNOSIS — M79.7 FIBROMYALGIA: ICD-10-CM

## 2019-08-22 DIAGNOSIS — M47.817 LUMBOSACRAL SPONDYLOSIS WITHOUT MYELOPATHY: Primary | ICD-10-CM

## 2019-08-22 PROCEDURE — 99211 OFF/OP EST MAY X REQ PHY/QHP: CPT

## 2019-08-22 RX ORDER — HYDROCODONE BITARTRATE AND ACETAMINOPHEN 10; 325 MG/1; MG/1
1 TABLET ORAL EVERY 12 HOURS
Qty: 60 TABLET | Refills: 0 | Status: SHIPPED | OUTPATIENT
Start: 2019-09-21 | End: 2019-10-17

## 2019-08-22 RX ORDER — HYDROCODONE BITARTRATE AND ACETAMINOPHEN 10; 325 MG/1; MG/1
1 TABLET ORAL EVERY 12 HOURS
Qty: 60 TABLET | Refills: 0 | Status: SHIPPED | OUTPATIENT
Start: 2019-08-22 | End: 2019-09-21

## 2019-08-22 NOTE — PROGRESS NOTES
Patient presents to [facility] ambulatory for med eval.  She has chronic neck pain that radiates to her upper back. She was recently dx with lupus. She was put on plaquenil and states her pain has been better. She also takes norco and that helps as well.   ZOHRA

## 2019-08-22 NOTE — CHRONIC PAIN
MEDICATION EVALUATION  HISTORY OF PRESENT ILLNESS:  Inessa Broussard is a 38year old old female with history of fibromyalgia, joint pain, lumbar radiculopathy who returns for medication evaluation.   She continues to report generalized back pain that does no by mouth nightly. Disp:  Rfl:    BusPIRone HCl 15 MG Oral Tab Take 15 mg by mouth 3 (three) times daily. Take one in the morning,1 at lunch time and 2 tablets at night. Disp:  Rfl: 0   Lithium Carbonate 300 MG Oral Cap Take 450 mg by mouth daily.    Disp: Santi Rodriguez DO at [address] [facility]   • REMOVAL GALLBLADDER         FAMILY HISTORY:  Family History   Problem Relation Age of Onset   • Thyroid disease Mother         Goiter sugery   • Heart Disorder Mother    • Heart Disease Mother    • Thyro abused: Not on file        Physically abused: Not on file        Forced sexual activity: Not on file    Other Topics      Concerns:        Not on file    Social History Narrative      Not on file    ADVANCE CARE PLANNING:    The patient did not wish/unable

## 2019-09-05 ENCOUNTER — OFFICE VISIT (OUTPATIENT)
Dept: RHEUMATOLOGY | Facility: CLINIC | Age: 44
End: 2019-09-05
Payer: COMMERCIAL

## 2019-09-05 VITALS
DIASTOLIC BLOOD PRESSURE: 78 MMHG | SYSTOLIC BLOOD PRESSURE: 116 MMHG | HEIGHT: 61.6 IN | RESPIRATION RATE: 16 BRPM | BODY MASS INDEX: 26.65 KG/M2 | WEIGHT: 143 LBS | HEART RATE: 93 BPM

## 2019-09-05 DIAGNOSIS — Z79.899 LONG-TERM USE OF PLAQUENIL: Primary | ICD-10-CM

## 2019-09-05 DIAGNOSIS — M35.9 UNDIFFERENTIATED CONNECTIVE TISSUE DISEASE (HCC): ICD-10-CM

## 2019-09-05 PROCEDURE — 99214 OFFICE O/P EST MOD 30 MIN: CPT | Performed by: INTERNAL MEDICINE

## 2019-09-05 RX ORDER — HYDROXYCHLOROQUINE SULFATE 200 MG/1
200 TABLET, FILM COATED ORAL 2 TIMES DAILY
Qty: 180 TABLET | Refills: 1 | Status: SHIPPED | OUTPATIENT
Start: 2019-09-05 | End: 2019-12-26

## 2019-09-05 RX ORDER — CYCLOBENZAPRINE HCL 5 MG
5 TABLET ORAL NIGHTLY PRN
Qty: 90 TABLET | Refills: 1 | Status: SHIPPED | OUTPATIENT
Start: 2019-09-05 | End: 2020-03-23

## 2019-09-05 NOTE — PATIENT INSTRUCTIONS
1. Cont.  hydroxychlroquine 200mg twice a day -   2. Return to clinic in 4 months  In 1/2020   3. Cont. cyclobenzaprien 5mg at night.    4. Refer to ophthalmologist for plalquneil use - dr. [facility] -[phone]  -   Or optometrist - get OCT scan

## 2019-09-05 NOTE — PROGRESS NOTES
Shira Hernandez is a 38year old female who presents for Patient presents with: Follow - Up: Positive RICHARD  Back Pain: upper  Neck Pain  Finger Pain  Shoulder Pain  . HPI:     I had the pleasure of seeing Shira Hernandez on 7/25/2019 for evaluation.  She is hurts occl - better since surgery. Her right buttocks also hurts. The hips come and go. She has swelling in her hands at times. Her hands swell sometimes but not all the time. Her rings are tight.      9/5/2019  She is feeling much better on hcq 200mg one in the morning,1 at lunch time and 2 tablets at night. Disp:  Rfl: 0   Lithium Carbonate 300 MG Oral Cap Take 450 mg by mouth daily. Disp:  Rfl: 0   Cholecalciferol (VITAMIN D) 2000 units Oral Cap Take 4,000 Units by mouth.    Disp:  Rfl:    HYDROcod child.     Social History:  Social History    Tobacco Use      Smoking status: Current Every Day Smoker        Packs/day: 0.50        Years: 20.00        Pack years: 10        Types: Cigarettes      Smokeless tobacco: Never Used    Alcohol use: No      Alco rashes- facial rash not easiliy seen - mild pink area over cheeks - crosses nasolabial border a little bit - not distinct   CVS: RRR, no murmurs  RS: CTAB, no crackles, no rhonchi  ABD: Soft Non tender, no HSM felt, BS positive  Joint exam:   18/18 tender Urine      Negative Negative   ASCORBIC ACID      Negative mg/dL Negative   Microscopic       Microscopic not indicated     7/10/2019 - [facility]  Findings suggestive of mild hepatic steatosis. Post cholecystectomy. No biliary ductal dilatation.     7/8 norco use - as well     2. Mild transaminitis - u/s liver - normal    Summary:  1. Cont.  hydroxychlroquine 200mg twice a day -   2. Return to clinic in 4 months  In 1/2020   3. Cont. cyclobenzaprien 5mg at night.    4. Refer to ophthalmologist for plalqune

## 2019-09-16 ENCOUNTER — HOSPITAL ENCOUNTER (OUTPATIENT)
Dept: ULTRASOUND IMAGING | Age: 44
Discharge: HOME | End: 2019-09-16
Attending: INTERNAL MEDICINE
Payer: COMMERCIAL

## 2019-09-16 DIAGNOSIS — E03.8 HYPOTHYROIDISM DUE TO HASHIMOTO'S THYROIDITIS: ICD-10-CM

## 2019-09-16 DIAGNOSIS — E06.3 HYPOTHYROIDISM DUE TO HASHIMOTO'S THYROIDITIS: ICD-10-CM

## 2019-09-16 PROCEDURE — 76536 US EXAM OF HEAD AND NECK: CPT | Performed by: INTERNAL MEDICINE

## 2019-10-17 ENCOUNTER — OFFICE VISIT (OUTPATIENT)
Dept: PAIN CLINIC | Facility: HOSPITAL | Age: 44
End: 2019-10-17
Attending: ANESTHESIOLOGY
Payer: COMMERCIAL

## 2019-10-17 VITALS
HEART RATE: 90 BPM | SYSTOLIC BLOOD PRESSURE: 129 MMHG | BODY MASS INDEX: 26.65 KG/M2 | RESPIRATION RATE: 18 BRPM | DIASTOLIC BLOOD PRESSURE: 78 MMHG | WEIGHT: 143 LBS | HEIGHT: 61.6 IN

## 2019-10-17 DIAGNOSIS — M79.7 FIBROMYALGIA: ICD-10-CM

## 2019-10-17 DIAGNOSIS — M47.817 LUMBOSACRAL SPONDYLOSIS WITHOUT MYELOPATHY: Primary | ICD-10-CM

## 2019-10-17 DIAGNOSIS — F11.90 CHRONIC, CONTINUOUS USE OF OPIOIDS: ICD-10-CM

## 2019-10-17 PROCEDURE — 99211 OFF/OP EST MAY X REQ PHY/QHP: CPT

## 2019-10-17 RX ORDER — HYDROCODONE BITARTRATE AND ACETAMINOPHEN 10; 325 MG/1; MG/1
1 TABLET ORAL EVERY 12 HOURS PRN
Qty: 60 TABLET | Refills: 0 | Status: SHIPPED | OUTPATIENT
Start: 2019-11-21 | End: 2019-12-17 | Stop reason: DRUGHIGH

## 2019-10-17 RX ORDER — HYDROCODONE BITARTRATE AND ACETAMINOPHEN 10; 325 MG/1; MG/1
1 TABLET ORAL EVERY 12 HOURS
Qty: 60 TABLET | Refills: 0 | Status: SHIPPED | OUTPATIENT
Start: 2019-10-22 | End: 2019-11-21

## 2019-10-17 NOTE — PROGRESS NOTES
10/17/19  PRESENTS AMBULATORY TO CPM;  MEDICAL MANAGEMENT FOR CHRONIC N TOSHA, BACK, JOINT PAIN;  RATES HER PAIN 7/10;  PT REPORTS NEW DX OF LUPUS IN AUGUST; STATES THAT SINCE SHE STARTED THE LUPUS MEDICATION-\"MY PAIN HAS IMPROVED NOW 6-7, WHERE BEFPRE MY P

## 2019-10-17 NOTE — CHRONIC PAIN
MEDICATION EVALUATION  HISTORY OF PRESENT ILLNESS:  Missy Gayle is a 38year old old female with history of fibromyalgia, joint pain, lumbar radiculopathy who returns for medication evaluation.   She continues to report generalized back pain that does no 850 mg 2 (two) times daily with meals. , Disp: , Rfl:   lamoTRIgine 100 MG Oral Tab, Take 100 mg by mouth daily. , Disp: , Rfl:   DULOXETINE HCL OR, Take 5 mg by mouth 2 (two) times daily. , Disp: , Rfl:   TraZODone HCl 50 MG Oral Tab, Take 50 mg by mouth R, DO at [facility] / TRANSFORAMINAL EPIDURAL STEROID INJECTION N/A 9/24/2015    Performed by Asia Guerra, DO at [facility] / TRANSFORAMINAL EPIDURAL STEROID INJECTION N/A 8/28/2015    Performed by Mauro Gallardo Active member of club or organization: Not on file        Attends meetings of clubs or organizations: Not on file        Relationship status: Not on file      Intimate partner violence:        Fear of current or ex partner: Not on file        Emotionally a

## 2019-12-02 RX ORDER — LEVOTHYROXINE SODIUM 0.03 MG/1
TABLET ORAL
Qty: 30 TABLET | Refills: 2 | Status: SHIPPED | OUTPATIENT
Start: 2019-12-02 | End: 2020-03-11

## 2019-12-17 NOTE — CHRONIC PAIN
MEDICATION EVALUATION  HISTORY OF PRESENT ILLNESS:  Estuardo Guerra is a 38year old old female with history of fibromyalgia, joint pain, lumbar radiculopathy who returns for medication evaluation.   She has chronic low back pain and a December 2015 Ish Late needed for Muscle spasms.  90 tablet 1   • HYDROcodone Bitartrate (ZOHYDRO ER) 10 MG Oral Capsule Extended Release 12 hour Abuse-Deterrent Zohydro ER 10 mg capsule, oral only,extended release     • DULoxetine HCl 20 MG Oral Cap DR Particles Take 20 mg by mo Hashimoto's thyroiditis     Cervical polyp    Past Medical History:   Diagnosis Date   • Anxiety state    • Back problem    • Depression    • Fibromyalgia     DX 2015   • Fibromyalgia    • Hashimoto's disease    • Migraines    • Neck pain        SURGICAL Substance and Sexual Activity      Alcohol use: No        Alcohol/week: 0.0 standard drinks      Drug use: No      Sexual activity: Yes        Partners: Male    Lifestyle      Physical activity:        Days per week: Not on file        Minutes per session: conservatively because she is not sure if it is a flare or the flu. She was doing well on her medications, but we will try to reduce the amount of codon she takes on a daily basis.   She also needs further evaluation of her back and neck pain,  PLAN: We di

## 2019-12-17 NOTE — PROGRESS NOTES
12/17/19   PRESENTS AMBULATORY TO [facility];  MEDICAL MANAGEMENT FOR CHRONIC N TOSHA, BACK, JOINT PAIN;  RATES HER PAIN 9/10;  PT REPORTS NOT FEELING WELL UNSURE IF SHE IS IN A LUPUS FLARE OR IS REALLY ILL;   NEW DX OF LUPUS WAS  IN AUGUST;    SEEN BY DR. Van Macdonald;

## 2019-12-26 ENCOUNTER — APPOINTMENT (OUTPATIENT)
Dept: LAB | Age: 44
End: 2019-12-26
Attending: ANESTHESIOLOGY
Payer: COMMERCIAL

## 2019-12-26 ENCOUNTER — HOSPITAL ENCOUNTER (OUTPATIENT)
Dept: GENERAL RADIOLOGY | Age: 44
Discharge: HOME | End: 2019-12-26
Attending: ANESTHESIOLOGY
Payer: COMMERCIAL

## 2019-12-26 ENCOUNTER — OFFICE VISIT (OUTPATIENT)
Dept: RHEUMATOLOGY | Facility: CLINIC | Age: 44
End: 2019-12-26
Payer: COMMERCIAL

## 2019-12-26 VITALS
RESPIRATION RATE: 16 BRPM | HEIGHT: 61.6 IN | HEART RATE: 100 BPM | BODY MASS INDEX: 26.09 KG/M2 | SYSTOLIC BLOOD PRESSURE: 120 MMHG | DIASTOLIC BLOOD PRESSURE: 62 MMHG | OXYGEN SATURATION: 97 % | WEIGHT: 140 LBS

## 2019-12-26 DIAGNOSIS — E06.3 HYPOTHYROIDISM DUE TO HASHIMOTO'S THYROIDITIS: ICD-10-CM

## 2019-12-26 DIAGNOSIS — G89.29 CHRONIC LOW BACK PAIN WITH SCIATICA, SCIATICA LATERALITY UNSPECIFIED, UNSPECIFIED BACK PAIN LATERALITY: ICD-10-CM

## 2019-12-26 DIAGNOSIS — Z79.899 LONG-TERM USE OF PLAQUENIL: ICD-10-CM

## 2019-12-26 DIAGNOSIS — E06.3 HASHIMOTO'S THYROIDITIS: ICD-10-CM

## 2019-12-26 DIAGNOSIS — M35.9 UNDIFFERENTIATED CONNECTIVE TISSUE DISEASE (HCC): Primary | ICD-10-CM

## 2019-12-26 DIAGNOSIS — M79.7 FIBROMYALGIA: ICD-10-CM

## 2019-12-26 DIAGNOSIS — E03.8 HYPOTHYROIDISM DUE TO HASHIMOTO'S THYROIDITIS: ICD-10-CM

## 2019-12-26 DIAGNOSIS — M54.40 CHRONIC LOW BACK PAIN WITH SCIATICA, SCIATICA LATERALITY UNSPECIFIED, UNSPECIFIED BACK PAIN LATERALITY: ICD-10-CM

## 2019-12-26 DIAGNOSIS — Z00.00 HEALTHCARE MAINTENANCE: ICD-10-CM

## 2019-12-26 PROCEDURE — 99244 OFF/OP CNSLTJ NEW/EST MOD 40: CPT | Performed by: INTERNAL MEDICINE

## 2019-12-26 PROCEDURE — 84481 FREE ASSAY (FT-3): CPT

## 2019-12-26 PROCEDURE — 36415 COLL VENOUS BLD VENIPUNCTURE: CPT

## 2019-12-26 PROCEDURE — 72202 X-RAY EXAM SI JOINTS 3/> VWS: CPT | Performed by: ANESTHESIOLOGY

## 2019-12-26 PROCEDURE — 84439 ASSAY OF FREE THYROXINE: CPT

## 2019-12-26 PROCEDURE — 86160 COMPLEMENT ANTIGEN: CPT | Performed by: INTERNAL MEDICINE

## 2019-12-26 PROCEDURE — 83036 HEMOGLOBIN GLYCOSYLATED A1C: CPT

## 2019-12-26 PROCEDURE — 84443 ASSAY THYROID STIM HORMONE: CPT

## 2019-12-26 RX ORDER — HYDROXYCHLOROQUINE SULFATE 200 MG/1
200 TABLET, FILM COATED ORAL 2 TIMES DAILY
Qty: 180 TABLET | Refills: 1 | Status: SHIPPED | OUTPATIENT
Start: 2019-12-26 | End: 2020-09-23

## 2019-12-26 NOTE — PATIENT INSTRUCTIONS
You were seen today for Fibromyalgia and Undifferentiated connective tissue disease (early lupus)  Now lets get some more blood work  Cont plaquenel  Need to see ophthomologist

## 2019-12-26 NOTE — PROGRESS NOTES
Froilan Miller is a 38year old female who presents for No chief complaint on file. Jason Spann    HPI:   CC:  Consult: referred by PCP Dr. Read Like    This is a 39 yo F with hx of L5-S1 Fusion (Dec 2015), Hashimotos, Fibromyalgia, Rosacea (dx by derm 2014), Anxiety Encounters:  12/17/19 : 143 lb (64.9 kg)  10/17/19 : 143 lb (64.9 kg)    There is no height or weight on file to calculate BMI.       Current Outpatient Medications   Medication Sig Dispense Refill   • [START ON 1/20/2020] HYDROcodone-acetaminophen 7.5-325 L5-S1 ALIF    • LUMBAR / TRANSFORAMINAL EPIDURAL STEROID INJECTION N/A 11/5/2015    Performed by Jack Perera DO at [facility] 69 / TRANSFORAMINAL EPIDURAL STEROID INJECTION N/A 9/24/2015    Performed by Jack Perera DO at  no hx of DM  Joint/Muscluskeltal: see HPI,   All other ROS are negative. EXAM:   There were no vitals taken for this visit. GEN: AAOx3, NAD  HEENT: EOMI, PERRLA, no injection or icterus, oral mucosa moist, no oral lesions. No lymphadenopathy.  No facia BETA GLOBULINS      0.68 - 1.23 g/dL 0.91   GAMMA GLOBULINS      0.62 - 1.70 g/dL 1.26   ALBUMIN/GLOBULIN RATIO      1.00 - 2.00 1.35   SPE INTERPRETATION       Serum protein electrophoresis shows no evidence of significant abnormalities.      Component tenosynovitis. 4. Tear versus degeneration of the superior labrum; the anterior and posterior le are grossly intact.  If clinically indicated, followup gadolinium arthrogram with MRI post arthrogram could be performed for more detailed labral assessment

## 2019-12-27 ENCOUNTER — TELEPHONE (OUTPATIENT)
Dept: FAMILY MEDICINE CLINIC | Facility: CLINIC | Age: 44
End: 2019-12-27

## 2019-12-27 NOTE — TELEPHONE ENCOUNTER
----- Message from Nguyen Carrera MD sent at 12/26/2019  6:33 PM CST -----  Please let her know that thyriod labs are stable.  No diabetes

## 2020-01-11 ENCOUNTER — HOSPITAL ENCOUNTER (OUTPATIENT)
Dept: MRI IMAGING | Age: 45
Discharge: HOME | End: 2020-01-11
Attending: ANESTHESIOLOGY
Payer: COMMERCIAL

## 2020-01-11 DIAGNOSIS — M54.2 CHRONIC NECK PAIN: ICD-10-CM

## 2020-01-11 DIAGNOSIS — G89.29 CHRONIC LOW BACK PAIN WITH SCIATICA, SCIATICA LATERALITY UNSPECIFIED, UNSPECIFIED BACK PAIN LATERALITY: ICD-10-CM

## 2020-01-11 DIAGNOSIS — M54.40 CHRONIC LOW BACK PAIN WITH SCIATICA, SCIATICA LATERALITY UNSPECIFIED, UNSPECIFIED BACK PAIN LATERALITY: ICD-10-CM

## 2020-01-11 DIAGNOSIS — G89.29 CHRONIC NECK PAIN: ICD-10-CM

## 2020-01-11 PROCEDURE — 72156 MRI NECK SPINE W/O & W/DYE: CPT | Performed by: ANESTHESIOLOGY

## 2020-01-11 PROCEDURE — A9575 INJ GADOTERATE MEGLUMI 0.1ML: HCPCS | Performed by: ANESTHESIOLOGY

## 2020-01-11 PROCEDURE — 72158 MRI LUMBAR SPINE W/O & W/DYE: CPT | Performed by: ANESTHESIOLOGY

## 2020-01-14 ENCOUNTER — OFFICE VISIT (OUTPATIENT)
Dept: OBGYN CLINIC | Facility: CLINIC | Age: 45
End: 2020-01-14
Payer: COMMERCIAL

## 2020-01-14 VITALS
DIASTOLIC BLOOD PRESSURE: 74 MMHG | SYSTOLIC BLOOD PRESSURE: 118 MMHG | WEIGHT: 139 LBS | BODY MASS INDEX: 25.91 KG/M2 | HEIGHT: 61.6 IN

## 2020-01-14 DIAGNOSIS — Z12.31 ENCOUNTER FOR SCREENING MAMMOGRAM FOR BREAST CANCER: ICD-10-CM

## 2020-01-14 DIAGNOSIS — R68.82 DECREASED LIBIDO: ICD-10-CM

## 2020-01-14 DIAGNOSIS — Z01.419 ENCOUNTER FOR WELL WOMAN EXAM WITH ROUTINE GYNECOLOGICAL EXAM: Primary | ICD-10-CM

## 2020-01-14 PROCEDURE — 99396 PREV VISIT EST AGE 40-64: CPT | Performed by: OBSTETRICS & GYNECOLOGY

## 2020-01-14 PROCEDURE — 99212 OFFICE O/P EST SF 10 MIN: CPT | Performed by: OBSTETRICS & GYNECOLOGY

## 2020-01-15 LAB — HPV I/H RISK 1 DNA SPEC QL NAA+PROBE: NEGATIVE

## 2020-01-20 NOTE — PROGRESS NOTES
HPI:   Cara Hong is a 38year old female who presents for an annual/pap,  And for decreased libido.        Wt Readings from Last 6 Encounters:  01/14/20 : 139 lb (63 kg)  12/26/19 : 140 lb (63.5 kg)  12/17/19 : 143 lb (64.9 kg)  10/17/19 : 143 lb (64.9 1/20/2020] HYDROcodone-acetaminophen 7.5-325 MG Oral Tab Take 1 tablet by mouth every 8 (eight) hours as needed for Pain.  (Patient not taking: Reported on 1/14/2020 ) 90 tablet 0   • HYDROcodone-acetaminophen 7.5-325 MG Oral Tab Take 1 tablet by mouth ever lesions  EYES:denies blurred vision or double vision  HEENT: denies nasal congestion, sinus pain or ST  LUNGS: denies shortness of breath with exertion  CARDIOVASCULAR: denies chest pain on exertion  GI: denies abdominal pain,denies heartburn  : denies d answered.

## 2020-01-30 ENCOUNTER — DOCUMENTATION ONLY (OUTPATIENT)
Dept: PAIN CLINIC | Facility: HOSPITAL | Age: 45
End: 2020-01-30

## 2020-01-30 NOTE — PROGRESS NOTES
MRI Lumbar [facility]--[id number]  MRI Lumbar Cervical--[id number]    Rcv'd letter of determination from [facility], both MRI's have been denied. Reason for denial: Pt needed to have at least 6 weeks of conservative  Tx and plain X-rays before having her MRI.

## 2020-02-17 ENCOUNTER — OFFICE VISIT (OUTPATIENT)
Dept: PAIN CLINIC | Facility: HOSPITAL | Age: 45
End: 2020-02-17
Attending: ANESTHESIOLOGY
Payer: COMMERCIAL

## 2020-02-17 VITALS
HEART RATE: 95 BPM | RESPIRATION RATE: 18 BRPM | SYSTOLIC BLOOD PRESSURE: 138 MMHG | BODY MASS INDEX: 25.91 KG/M2 | DIASTOLIC BLOOD PRESSURE: 95 MMHG | HEIGHT: 61.5 IN | WEIGHT: 139 LBS

## 2020-02-17 DIAGNOSIS — F11.90 CHRONIC NARCOTIC USE: ICD-10-CM

## 2020-02-17 DIAGNOSIS — M96.1 FAILED BACK SURGICAL SYNDROME: Primary | ICD-10-CM

## 2020-02-17 LAB
AMPHET UR QL SCN: NEGATIVE
BARBITURATES UR QL SCN: NEGATIVE
BENZODIAZ UR QL SCN: NEGATIVE
CANNABINOIDS UR QL SCN: NEGATIVE
COCAINE UR QL: NEGATIVE
MDMA UR QL SCN: NEGATIVE
METHADONE UR QL SCN: NEGATIVE
OXYCODONE UR QL SCN: NEGATIVE
PCP UR QL SCN: NEGATIVE

## 2020-02-17 PROCEDURE — 80361 OPIATES 1 OR MORE: CPT | Performed by: ANESTHESIOLOGY

## 2020-02-17 PROCEDURE — 99212 OFFICE O/P EST SF 10 MIN: CPT

## 2020-02-17 PROCEDURE — 80307 DRUG TEST PRSMV CHEM ANLYZR: CPT | Performed by: ANESTHESIOLOGY

## 2020-02-17 RX ORDER — HYDROCODONE BITARTRATE AND ACETAMINOPHEN 7.5; 325 MG/1; MG/1
1 TABLET ORAL EVERY 8 HOURS PRN
Qty: 90 TABLET | Refills: 0 | Status: SHIPPED | OUTPATIENT
Start: 2020-02-19 | End: 2020-03-20

## 2020-02-17 RX ORDER — HYDROCODONE BITARTRATE AND ACETAMINOPHEN 7.5; 325 MG/1; MG/1
1 TABLET ORAL EVERY 8 HOURS PRN
Qty: 90 TABLET | Refills: 0 | Status: SHIPPED | OUTPATIENT
Start: 2020-03-20 | End: 2020-04-19

## 2020-02-17 RX ORDER — METHYLPREDNISOLONE 4 MG/1
TABLET ORAL
Qty: 21 TABLET | Refills: 0 | Status: SHIPPED | OUTPATIENT
Start: 2020-02-17 | End: 2020-09-29

## 2020-02-17 NOTE — CHRONIC PAIN
MEDICATION EVALUATION  HISTORY OF PRESENT ILLNESS:  Jennifer Yost is a 38year old old female with history of fibromyalgia, joint pain, lumbar radiculopathy who returns for medication evaluation.   She has chronic low back pain and a December 2015 Ellen Mauri Take 1 tablet by mouth every 8 (eight) hours as needed for Pain.  (Patient not taking: Reported on 1/14/2020 ) 90 tablet 0   • LEVOTHYROXINE SODIUM 25 MCG Oral Tab TAKE ONE TABLET BY MOUTH daily BEFORE BREAKFAST 30 tablet 2   • Cyclobenzaprine HCl 5 MG Oral Medical History:   Diagnosis Date   • Anxiety state    • Back problem    • Depression    • Fibromyalgia     DX 2015   • Fibromyalgia    • Hashimoto's disease    • Migraines    • Neck pain        SURGICAL HISTORY:  Past Surgical History:   Procedure Lateral Activity      Alcohol use: No        Alcohol/week: 0.0 standard drinks      Drug use: No      Sexual activity: Yes        Partners: Male    Lifestyle      Physical activity:        Days per week: Not on file        Minutes per session: Not on file      Str -----------------------------------------------------------------------------------------------------------------------------------------------                  Qing Sherwood MD  [address] [address]       Interpretation   PROCEDURE:  MR suboptimally assessed given adjacent susceptibility artifacts. No high-grade neural compromise.              =====  CONCLUSION:      1. Postsurgical changes of L5-S1 anterior lumbar fusion. Resultant susceptibility artifacts limit assessment.   Accounting foramen magnum without Chiari malformation. PARASPINAL AREA:     No visible mass. OTHER:             There is no visible swelling of the prevertebral soft tissues. There is a 4-5 mm T2 hypointense left thyroid nodule.      CERVICAL DISC LEVELS:  C2-C Kina Gan is a 38year old  female, with known lupus with joint pain and cervical and lumbar radiculopathy. Patient has had a recent low-grade fever and exacerbation of her chronic pain.     She was doing well on her medications, but had an exacerbation of her

## 2020-02-17 NOTE — PROGRESS NOTES
02/17/2020   PRESENTS AMBULATORY TO [facility];  MEDICAL MANAGEMENT FOR CHRONIC N TOSHA, BACK, JOINT PAIN;  RATES HER PAIN 9/10; REPORTS NUMBNESS AND TINGING ON LEFT FINGERS, STARTED A MONTH AGO;  PT REPORTS NOT FEELING WELL UNSURE IF SHE IS IN A LUPUS FLARE OR IS

## 2020-02-19 LAB
OPIATES, UR, 6-ACETYLMORPHINE: <10 NG/ML
OPIATES, URINE, CODEINE: <20 NG/ML
OPIATES, URINE, HYDROCODONE: 102 NG/ML
OPIATES, URINE, HYDROMORPHONE: <20 NG/ML
OPIATES, URINE, MORPHINE: <20 NG/ML
OPIATES, URINE, NORHYDROCODONE: 800 NG/ML
OPIATES, URINE, NOROXYCODONE: <20 NG/ML
OPIATES, URINE, NOROXYMORPHONE: <20 NG/ML
OPIATES, URINE, OXYCODONE: <20 NG/ML
OPIATES, URINE, OXYMORPHONE: <20 NG/ML

## 2020-02-24 ENCOUNTER — DOCUMENTATION ONLY (OUTPATIENT)
Dept: PAIN CLINIC | Facility: HOSPITAL | Age: 45
End: 2020-02-24

## 2020-02-24 ENCOUNTER — TELEPHONE (OUTPATIENT)
Dept: PAIN CLINIC | Facility: HOSPITAL | Age: 45
End: 2020-02-24

## 2020-02-24 NOTE — TELEPHONE ENCOUNTER
Writer attempted to reach pt to schedule procedure, no answer message left requesting a call back. Mychart message also sent. Writer will wait for pt to call us back.

## 2020-02-24 NOTE — PROGRESS NOTES
Procedure code Torres Lloyd @ # [phone]    Spoke with Yuliana Machuca, no PA required b/c case is outpatient.      Ref # [id number]    Writer will reach out to pt and schedule procedure

## 2020-03-11 RX ORDER — LEVOTHYROXINE SODIUM 0.03 MG/1
TABLET ORAL
Qty: 30 TABLET | Refills: 0 | Status: SHIPPED | OUTPATIENT
Start: 2020-03-11 | End: 2020-04-10

## 2020-03-23 RX ORDER — CYCLOBENZAPRINE HCL 5 MG
5 TABLET ORAL NIGHTLY PRN
Qty: 90 TABLET | Refills: 0 | Status: SHIPPED | OUTPATIENT
Start: 2020-03-23 | End: 2020-06-20

## 2020-03-27 RX ORDER — TRAMADOL HYDROCHLORIDE 50 MG/1
TABLET ORAL
Qty: 90 TABLET | Refills: 2 | Status: SHIPPED | OUTPATIENT
Start: 2020-03-27 | End: 2020-06-30

## 2020-04-07 ENCOUNTER — TELEPHONE (OUTPATIENT)
Dept: PAIN CLINIC | Facility: HOSPITAL | Age: 45
End: 2020-04-07

## 2020-04-10 RX ORDER — LEVOTHYROXINE SODIUM 0.03 MG/1
TABLET ORAL
Qty: 30 TABLET | Refills: 0 | Status: SHIPPED | OUTPATIENT
Start: 2020-04-10 | End: 2020-05-11

## 2020-04-13 ENCOUNTER — VIRTUAL PHONE E/M (OUTPATIENT)
Dept: PAIN CLINIC | Facility: HOSPITAL | Age: 45
End: 2020-04-13
Attending: ANESTHESIOLOGY
Payer: COMMERCIAL

## 2020-04-13 DIAGNOSIS — M54.2 CHRONIC NECK PAIN: Primary | ICD-10-CM

## 2020-04-13 DIAGNOSIS — G89.29 CHRONIC LOW BACK PAIN WITH SCIATICA, SCIATICA LATERALITY UNSPECIFIED, UNSPECIFIED BACK PAIN LATERALITY: ICD-10-CM

## 2020-04-13 DIAGNOSIS — G89.29 CHRONIC NECK PAIN: Primary | ICD-10-CM

## 2020-04-13 DIAGNOSIS — M54.40 CHRONIC LOW BACK PAIN WITH SCIATICA, SCIATICA LATERALITY UNSPECIFIED, UNSPECIFIED BACK PAIN LATERALITY: ICD-10-CM

## 2020-04-13 RX ORDER — TRAMADOL HYDROCHLORIDE 50 MG/1
50 TABLET ORAL EVERY 8 HOURS PRN
Qty: 90 TABLET | Refills: 2 | Status: SHIPPED | OUTPATIENT
Start: 2020-04-13 | End: 2020-12-08

## 2020-04-13 RX ORDER — HYDROCODONE BITARTRATE AND ACETAMINOPHEN 7.5; 325 MG/1; MG/1
1 TABLET ORAL EVERY 8 HOURS PRN
Qty: 90 TABLET | Refills: 0 | Status: SHIPPED | OUTPATIENT
Start: 2020-04-13 | End: 2020-06-02

## 2020-04-13 NOTE — PROGRESS NOTES
Virtual Telephone Check-In    Shira Hernandez verbally consents to a Virtual/Telephone Check-In visit on 04/13/20. Patient understands and accepts financial responsibility for any deductible, co-insurance and/or co-pays associated with this service.     D her pain has increased since then but it may also be due to weather changes which has also increased her pain. The pain is in her neck and radiates into her arm.   Her low back pain is controlled by her medications  PLAN: Our plan is to maintain her medica

## 2020-05-11 ENCOUNTER — TELEPHONE (OUTPATIENT)
Dept: RHEUMATOLOGY | Facility: CLINIC | Age: 45
End: 2020-05-11

## 2020-05-11 RX ORDER — LEVOTHYROXINE SODIUM 0.03 MG/1
TABLET ORAL
Qty: 30 TABLET | Refills: 0 | Status: SHIPPED | OUTPATIENT
Start: 2020-05-11 | End: 2020-06-11

## 2020-05-11 NOTE — TELEPHONE ENCOUNTER
Patient scheduled an appt for an in-office visit for 5/15 but patient is having low grade fevers and exhaustion.

## 2020-05-14 ENCOUNTER — TELEMEDICINE (OUTPATIENT)
Dept: RHEUMATOLOGY | Facility: CLINIC | Age: 45
End: 2020-05-14

## 2020-05-14 DIAGNOSIS — E06.3 HASHIMOTO'S THYROIDITIS: ICD-10-CM

## 2020-05-14 DIAGNOSIS — M35.9 UNDIFFERENTIATED CONNECTIVE TISSUE DISEASE (HCC): Primary | ICD-10-CM

## 2020-05-14 DIAGNOSIS — Z79.899 LONG-TERM USE OF PLAQUENIL: ICD-10-CM

## 2020-05-14 DIAGNOSIS — M79.7 FIBROMYALGIA: ICD-10-CM

## 2020-05-14 PROCEDURE — 99213 OFFICE O/P EST LOW 20 MIN: CPT | Performed by: INTERNAL MEDICINE

## 2020-05-14 NOTE — PROGRESS NOTES
Missy Gayle is a 38year old female. HPI:   No chief complaint on file. This visit is conducted using Telemedicine with live, interactive video and audio.     Patient understands and accepts financial responsibility for any deductible, co-insuran improved. Today:  Video visit done today Myofascial pain syndrome vs UCTD  Now again having low grade temperatures 99-99.5 for 3 weeks now.  Low grade fever is daily  Now also feels exhausted, around 1/2 pm falling asleep and can't keep eyes open   Repor daily. Take one in the morning,1 at lunch time and 2 tablets at night.   0   • Lithium Carbonate 300 MG Oral Cap Take 450 mg by mouth daily. 0   • Cholecalciferol (VITAMIN D) 2000 units Oral Cap Take 4,000 Units by mouth.          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   She also has no evidence of leukopenia, lymphopenia, anemia or thrombocytopenia  - She does have slightly elevated

## 2020-06-01 RX ORDER — HYDROCODONE BITARTRATE AND ACETAMINOPHEN 7.5; 325 MG/1; MG/1
TABLET ORAL
Qty: 90 TABLET | Refills: 0 | OUTPATIENT
Start: 2020-06-01

## 2020-06-02 RX ORDER — TRAMADOL HYDROCHLORIDE 50 MG/1
50 TABLET ORAL EVERY 8 HOURS PRN
Qty: 90 TABLET | Refills: 2 | Status: CANCELLED | OUTPATIENT
Start: 2020-06-09 | End: 2020-07-09

## 2020-06-09 ENCOUNTER — OFFICE VISIT (OUTPATIENT)
Dept: PAIN CLINIC | Facility: HOSPITAL | Age: 45
End: 2020-06-09
Attending: NURSE PRACTITIONER
Payer: COMMERCIAL

## 2020-06-09 VITALS
BODY MASS INDEX: 23.16 KG/M2 | RESPIRATION RATE: 18 BRPM | WEIGHT: 139 LBS | SYSTOLIC BLOOD PRESSURE: 128 MMHG | DIASTOLIC BLOOD PRESSURE: 82 MMHG | HEIGHT: 65 IN

## 2020-06-09 DIAGNOSIS — G89.29 CHRONIC NECK PAIN: Primary | ICD-10-CM

## 2020-06-09 DIAGNOSIS — M54.40 CHRONIC LOW BACK PAIN WITH SCIATICA, SCIATICA LATERALITY UNSPECIFIED, UNSPECIFIED BACK PAIN LATERALITY: ICD-10-CM

## 2020-06-09 DIAGNOSIS — G89.29 CHRONIC LOW BACK PAIN WITH SCIATICA, SCIATICA LATERALITY UNSPECIFIED, UNSPECIFIED BACK PAIN LATERALITY: ICD-10-CM

## 2020-06-09 DIAGNOSIS — M96.1 FAILED BACK SURGICAL SYNDROME: ICD-10-CM

## 2020-06-09 DIAGNOSIS — M54.2 CHRONIC NECK PAIN: Primary | ICD-10-CM

## 2020-06-09 PROCEDURE — 99211 OFF/OP EST MAY X REQ PHY/QHP: CPT

## 2020-06-09 RX ORDER — ALPRAZOLAM 0.25 MG/1
TABLET ORAL
COMMUNITY
Start: 2020-06-02 | End: 2020-11-06

## 2020-06-09 RX ORDER — HYDROCODONE BITARTRATE AND ACETAMINOPHEN 7.5; 325 MG/1; MG/1
1 TABLET ORAL EVERY 8 HOURS PRN
Qty: 90 TABLET | Refills: 0 | Status: SHIPPED | OUTPATIENT
Start: 2020-06-09 | End: 2020-06-30

## 2020-06-09 NOTE — PROGRESS NOTES
6/9/2020    PRESENTS AMBULATORY TO [facility];  MEDICAL MANAGEMENT FOR CHRONIC N TOSHA, BACK, JOINT PAIN;  RATES HER PAIN 9/10; REPORTS SHE RAN OUT OF MEDICATIONS WEEK AGO;   REPORTS NOT FEELING WELL  SHE IS IN A LUPUS FLARE ; ;   NEW DX OF LUPUS WAS  IN [place] [address]

## 2020-06-09 NOTE — CHRONIC PAIN
MEDICATION EVALUATION  HISTORY OF PRESENT ILLNESS:  Hina Hogan is a 38year old old female with history of fibromyalgia, joint pain, lumbar radiculopathy who returns for medication evaluation. We last spoke to her via phone due to the pandemic.  She rep • cloNIDine HCl 0.1 MG Oral Tab Take 0.1 mg by mouth daily. • topiramate 50 MG Oral Tab Take 100 mg by mouth nightly. Take 2 tablets at night. • lamoTRIgine 100 MG Oral Tab Take 100 mg by mouth daily.        • TraZODone HCl 50 MG Oral Tab Take 50 [facility]   • LUMBAR / TRANSFORAMINAL EPIDURAL STEROID INJECTION N/A 9/24/2015    Performed by Jesse Gaona DO at [place] [address] / TRANSFORAMINAL EPIDURAL STEROID INJECTION N/A 8/28/2015    Performed Active member of club or organization: Not on file        Attends meetings of clubs or organizations: Not on file        Relationship status: Not on file      Intimate partner violence:        Fear of current or ex partner: Not on file        Emotiona comprehensive examination was performed utilizing a variety of imaging planes and imaging parameters to optimize visualization of suspected pathology.   Images were performed before and after the administration of intravenous gadolinium   contrast.       FI stenosis without additional significant neural compromise. 3. Retroflexed uterus. Trace free fluid in the pelvis, likely physiologic. 4. Lesser incidental findings as above.         Dictated by (CST): Miguel Banda MD on 1/11/2020 at 9:22       A disc/facet abnormality, spinal stenosis, or foraminal stenosis. C5-C6:  No significant disc/facet abnormality, spinal stenosis, or foraminal stenosis. C6-C7:  No significant disc/facet abnormality, spinal stenosis, or foraminal stenosis.     C7-T1:  M moderate pain   3.  Consider injections at follow up    Discussed with patient the risks of opioid medications including but not limited to dependence, addiction, respiratory depression, and death.  Patient advised not to consume ETOH or operating heavy mac

## 2020-06-11 RX ORDER — LEVOTHYROXINE SODIUM 0.03 MG/1
TABLET ORAL
Qty: 90 TABLET | Refills: 0 | Status: SHIPPED | OUTPATIENT
Start: 2020-06-11 | End: 2020-08-31

## 2020-06-16 ENCOUNTER — PATIENT MESSAGE (OUTPATIENT)
Dept: FAMILY MEDICINE CLINIC | Facility: CLINIC | Age: 45
End: 2020-06-16

## 2020-06-17 ENCOUNTER — APPOINTMENT (OUTPATIENT)
Dept: LAB | Age: 45
End: 2020-06-17
Attending: INTERNAL MEDICINE
Payer: COMMERCIAL

## 2020-06-17 DIAGNOSIS — M79.7 FIBROMYALGIA: ICD-10-CM

## 2020-06-17 DIAGNOSIS — Z79.899 LONG-TERM USE OF PLAQUENIL: ICD-10-CM

## 2020-06-17 DIAGNOSIS — M35.9 UNDIFFERENTIATED CONNECTIVE TISSUE DISEASE (HCC): ICD-10-CM

## 2020-06-17 DIAGNOSIS — E06.3 HASHIMOTO'S THYROIDITIS: ICD-10-CM

## 2020-06-17 PROCEDURE — 86255 FLUORESCENT ANTIBODY SCREEN: CPT

## 2020-06-17 PROCEDURE — 82728 ASSAY OF FERRITIN: CPT

## 2020-06-17 PROCEDURE — 86160 COMPLEMENT ANTIGEN: CPT | Performed by: INTERNAL MEDICINE

## 2020-06-17 PROCEDURE — 86225 DNA ANTIBODY NATIVE: CPT

## 2020-06-17 PROCEDURE — 86431 RHEUMATOID FACTOR QUANT: CPT | Performed by: INTERNAL MEDICINE

## 2020-06-17 PROCEDURE — 83876 ASSAY MYELOPEROXIDASE: CPT

## 2020-06-17 PROCEDURE — 83516 IMMUNOASSAY NONANTIBODY: CPT

## 2020-06-17 PROCEDURE — 85652 RBC SED RATE AUTOMATED: CPT | Performed by: INTERNAL MEDICINE

## 2020-06-17 PROCEDURE — 36415 COLL VENOUS BLD VENIPUNCTURE: CPT | Performed by: INTERNAL MEDICINE

## 2020-06-17 PROCEDURE — 80053 COMPREHEN METABOLIC PANEL: CPT | Performed by: INTERNAL MEDICINE

## 2020-06-17 PROCEDURE — 85025 COMPLETE CBC W/AUTO DIFF WBC: CPT | Performed by: INTERNAL MEDICINE

## 2020-06-17 PROCEDURE — 86200 CCP ANTIBODY: CPT | Performed by: INTERNAL MEDICINE

## 2020-06-17 PROCEDURE — 84439 ASSAY OF FREE THYROXINE: CPT

## 2020-06-17 PROCEDURE — 86140 C-REACTIVE PROTEIN: CPT | Performed by: INTERNAL MEDICINE

## 2020-06-17 PROCEDURE — 84443 ASSAY THYROID STIM HORMONE: CPT

## 2020-06-18 NOTE — TELEPHONE ENCOUNTER
From: Shira Hernandez  To:  Sylvie Granados MD  Sent: 6/16/2020 8:30 AM CDT  Subject: Other    Hi Dr. Zabrina Hernandez,  Because of my autoimmune issues, my work wants me to have a dr. note saying I can return to the physical office location before they can

## 2020-06-20 RX ORDER — CYCLOBENZAPRINE HCL 5 MG
5 TABLET ORAL NIGHTLY PRN
Qty: 30 TABLET | Refills: 0 | Status: SHIPPED | OUTPATIENT
Start: 2020-06-20 | End: 2020-06-30

## 2020-06-20 NOTE — TELEPHONE ENCOUNTER
Requested Prescriptions     Pending Prescriptions Disp Refills   • CYCLOBENZAPRINE 5 MG Oral Tab [Pharmacy Med Name: Cyclobenzaprine Hydrochloride 5 Mg Tab Acta] 30 tablet 0     Sig: Take 1 tablet (5 mg total) by mouth nightly as needed for Muscle spasms. AMERICAN      >=60 101   ALT (SGPT)      13 - 56 U/L 31   AST (SGOT)      15 - 37 U/L 19   ALKALINE PHOSPHATASE      37 - 98 U/L 110 (H)   Total Bilirubin      0.1 - 2.0 mg/dL 0.4   TOTAL PROTEIN      6.4 - 8.2 g/dL 6.7   Albumin      3.4 - 5.0 g/dL 3.7

## 2020-07-08 ENCOUNTER — OFFICE VISIT (OUTPATIENT)
Dept: PAIN CLINIC | Facility: HOSPITAL | Age: 45
End: 2020-07-08
Attending: NURSE PRACTITIONER
Payer: COMMERCIAL

## 2020-07-08 VITALS — BODY MASS INDEX: 23.16 KG/M2 | WEIGHT: 139 LBS | HEIGHT: 65 IN

## 2020-07-08 DIAGNOSIS — M43.10 SPONDYLOLISTHESIS, GRADE 1: ICD-10-CM

## 2020-07-08 DIAGNOSIS — M79.7 FIBROMYALGIA: ICD-10-CM

## 2020-07-08 DIAGNOSIS — M43.06 LUMBAR SPONDYLOLYSIS: ICD-10-CM

## 2020-07-08 DIAGNOSIS — M54.16 LUMBAR RADICULOPATHY, CHRONIC: Primary | ICD-10-CM

## 2020-07-08 PROCEDURE — 99211 OFF/OP EST MAY X REQ PHY/QHP: CPT

## 2020-07-08 RX ORDER — HYDROCODONE BITARTRATE AND ACETAMINOPHEN 7.5; 325 MG/1; MG/1
1 TABLET ORAL EVERY 8 HOURS PRN
Qty: 90 TABLET | Refills: 0 | Status: SHIPPED | OUTPATIENT
Start: 2020-07-09 | End: 2020-08-08

## 2020-07-08 RX ORDER — HYDROCODONE BITARTRATE AND ACETAMINOPHEN 7.5; 325 MG/1; MG/1
1 TABLET ORAL EVERY 8 HOURS PRN
Qty: 90 TABLET | Refills: 0 | Status: SHIPPED | OUTPATIENT
Start: 2020-07-08 | End: 2020-08-07

## 2020-07-08 RX ORDER — CYCLOBENZAPRINE HCL 5 MG
5 TABLET ORAL NIGHTLY PRN
Qty: 30 TABLET | Refills: 1 | Status: SHIPPED | OUTPATIENT
Start: 2020-07-09 | End: 2020-08-08

## 2020-07-08 RX ORDER — TRAMADOL HYDROCHLORIDE 50 MG/1
50 TABLET ORAL EVERY 8 HOURS PRN
Qty: 90 TABLET | Refills: 2 | Status: SHIPPED | OUTPATIENT
Start: 2020-07-09 | End: 2020-08-08

## 2020-07-08 NOTE — CHRONIC PAIN
[facility]  Follow Up Visit Report       Patient name: Ene Sanchez 38year old female  : 1975  MRN: [id number]  Referring MD: Lida Wheeler MD    COMPLAINT:  Patient is here for med evaluation.   She is ta Thyroid disease Other         Unlce and Aunt   • Heart Disorder Father    • Other (COPD) Father    • Breast Cancer Maternal Cousin    • Cancer Maternal Aunt         lung cancer     SOCIAL HISTORY:  Social History    Tobacco Use      Smoking status: Current •  topiramate 50 MG Oral Tab, Take 100 mg by mouth nightly. Take 2 tablets at night., Disp: , Rfl:   •  lamoTRIgine 100 MG Oral Tab, Take 100 mg by mouth daily.   , Disp: , Rfl:   •  TraZODone HCl 50 MG Oral Tab, Take 50 mg by mouth nightly., Disp: , Rfl: Present And typical fibromyalgia areas including upper body lower body right side left side    LABS:  Lab Results   Component Value Date    WBC 8.2 06/17/2020    RBC 4.83 06/17/2020    HGB 13.1 06/17/2020    HCT 39.5 06/17/2020    .0 06/17/2020    P

## 2020-07-08 NOTE — PROGRESS NOTES
7/8/2020 presents ambulatory to [facility];  Medical management chronic neck pain;  Pt hx of Lupus-\"actually today is a good day pain 6\"; Stable on her medications; Seen by Dr. Saeed Nur; refer to dictation for the POC.   NA-2/17/2020  UDS-2/27/2020    REFILL

## 2020-08-08 ENCOUNTER — OFFICE VISIT (OUTPATIENT)
Dept: ENDOCRINOLOGY CLINIC | Facility: CLINIC | Age: 45
End: 2020-08-08
Payer: COMMERCIAL

## 2020-08-08 VITALS
SYSTOLIC BLOOD PRESSURE: 123 MMHG | WEIGHT: 145 LBS | DIASTOLIC BLOOD PRESSURE: 81 MMHG | HEART RATE: 93 BPM | HEIGHT: 65 IN | BODY MASS INDEX: 24.16 KG/M2

## 2020-08-08 DIAGNOSIS — E04.2 MULTIPLE THYROID NODULES: ICD-10-CM

## 2020-08-08 DIAGNOSIS — E06.3 HYPOTHYROIDISM DUE TO HASHIMOTO'S THYROIDITIS: Primary | ICD-10-CM

## 2020-08-08 DIAGNOSIS — E03.8 HYPOTHYROIDISM DUE TO HASHIMOTO'S THYROIDITIS: Primary | ICD-10-CM

## 2020-08-08 PROCEDURE — 3079F DIAST BP 80-89 MM HG: CPT | Performed by: INTERNAL MEDICINE

## 2020-08-08 PROCEDURE — 3008F BODY MASS INDEX DOCD: CPT | Performed by: INTERNAL MEDICINE

## 2020-08-08 PROCEDURE — 3074F SYST BP LT 130 MM HG: CPT | Performed by: INTERNAL MEDICINE

## 2020-08-08 PROCEDURE — 99214 OFFICE O/P EST MOD 30 MIN: CPT | Performed by: INTERNAL MEDICINE

## 2020-08-08 RX ORDER — ESTRADIOL 0.1 MG/G
0.1 CREAM VAGINAL DAILY
Qty: 42.5 G | Refills: 1 | Status: SHIPPED | OUTPATIENT
Start: 2020-08-08 | End: 2020-08-13 | Stop reason: DRUGHIGH

## 2020-08-08 RX ORDER — BUSPIRONE HYDROCHLORIDE 15 MG/1
TABLET ORAL
COMMUNITY
Start: 2020-07-21

## 2020-08-08 RX ORDER — ATOMOXETINE 60 MG/1
CAPSULE ORAL
COMMUNITY
End: 2020-09-29

## 2020-08-08 RX ORDER — ATOMOXETINE 60 MG/1
CAPSULE ORAL
COMMUNITY
Start: 2020-08-03 | End: 2020-09-29

## 2020-08-08 NOTE — PROGRESS NOTES
Name: Estuardo Guerra  Date: 8/8/2020    Referring Physician: No ref. provider found    Patient presents with: Follow - Up  Swallowing      HISTORY OF PRESENT ILLNESS   Estuardo Guerra is a 41year old female who presents for Patient presents with:   Follow dysphagia. Her main complaint today is persistently low libido. Previous labs did demonstrate menopause. REVIEW OF SYSTEMS  Eyes: no change in vision  Neurologic: no headache, generalized or focal weakness or numbness.   Head: normal  ENT: normal  [place] Rfl:   •  lamoTRIgine 100 MG Oral Tab, Take 100 mg by mouth daily. , Disp: , Rfl:   •  TraZODone HCl 50 MG Oral Tab, Take 50 mg by mouth nightly., Disp: , Rfl:   •  Lithium Carbonate 300 MG Oral Cap, Take 450 mg by mouth daily.   , Disp: , Rfl: 0  •  Flor sclera.   Ears/Nose/Mouth/Throat/Neck:  normal hearing, normal speech and diffusely enlarged thyroid gland  Neurologic: sensory grossly intact and motor grossly intact  Musculoskeletal:  normal muscle strength and tone  PV: normal pulses of carotids, pedals

## 2020-08-12 ENCOUNTER — TELEPHONE (OUTPATIENT)
Dept: ENDOCRINOLOGY CLINIC | Facility: CLINIC | Age: 45
End: 2020-08-12

## 2020-08-12 NOTE — TELEPHONE ENCOUNTER
Romulo Varner from [place] is calling about Estradiol 0.1 MG/GM Vaginal Cream needs clarification on directions.  Please advise

## 2020-08-13 RX ORDER — ESTRADIOL 0.1 MG/G
0.5 CREAM VAGINAL DAILY
Qty: 42.5 G | Refills: 0 | Status: SHIPPED | OUTPATIENT
Start: 2020-08-13 | End: 2022-01-18

## 2020-08-13 NOTE — TELEPHONE ENCOUNTER
Noted. RN sent a new script for estradiol 0.1 mg/gm vaginal cream place 0.5 g vaginally daily as per written order

## 2020-08-13 NOTE — TELEPHONE ENCOUNTER
Pharmacy was contacted and spoke to Werner Rashid. They want to know if the direction for estrace was written by error. She states usually the direction would say use a small amount daily, or use 0.5 g daily or 1 g daily.   States it comes with a measuring device

## 2020-08-31 RX ORDER — LEVOTHYROXINE SODIUM 0.03 MG/1
TABLET ORAL
Qty: 90 TABLET | Refills: 0 | Status: SHIPPED | OUTPATIENT
Start: 2020-08-31 | End: 2020-12-31

## 2020-09-03 RX ORDER — HYDROCODONE BITARTRATE AND ACETAMINOPHEN 7.5; 325 MG/1; MG/1
1 TABLET ORAL EVERY 8 HOURS PRN
COMMUNITY
End: 2020-09-03

## 2020-09-05 ENCOUNTER — HOSPITAL ENCOUNTER (OUTPATIENT)
Dept: ULTRASOUND IMAGING | Age: 45
Discharge: HOME | End: 2020-09-05
Attending: INTERNAL MEDICINE
Payer: COMMERCIAL

## 2020-09-05 DIAGNOSIS — E04.2 MULTIPLE THYROID NODULES: ICD-10-CM

## 2020-09-05 PROCEDURE — 76536 US EXAM OF HEAD AND NECK: CPT | Performed by: INTERNAL MEDICINE

## 2020-09-08 ENCOUNTER — OFFICE VISIT (OUTPATIENT)
Dept: PAIN CLINIC | Facility: HOSPITAL | Age: 45
End: 2020-09-08
Attending: NURSE PRACTITIONER
Payer: COMMERCIAL

## 2020-09-08 VITALS — OXYGEN SATURATION: 99 % | SYSTOLIC BLOOD PRESSURE: 131 MMHG | HEART RATE: 110 BPM | DIASTOLIC BLOOD PRESSURE: 88 MMHG

## 2020-09-08 DIAGNOSIS — G89.29 CHRONIC NECK PAIN: ICD-10-CM

## 2020-09-08 DIAGNOSIS — M79.7 FIBROMYALGIA: ICD-10-CM

## 2020-09-08 DIAGNOSIS — M54.40 CHRONIC LOW BACK PAIN WITH SCIATICA, SCIATICA LATERALITY UNSPECIFIED, UNSPECIFIED BACK PAIN LATERALITY: ICD-10-CM

## 2020-09-08 DIAGNOSIS — M54.2 CHRONIC NECK PAIN: ICD-10-CM

## 2020-09-08 DIAGNOSIS — G89.29 CHRONIC LOW BACK PAIN WITH SCIATICA, SCIATICA LATERALITY UNSPECIFIED, UNSPECIFIED BACK PAIN LATERALITY: ICD-10-CM

## 2020-09-08 DIAGNOSIS — M54.16 LUMBAR RADICULOPATHY, CHRONIC: Primary | ICD-10-CM

## 2020-09-08 PROCEDURE — 99211 OFF/OP EST MAY X REQ PHY/QHP: CPT

## 2020-09-08 RX ORDER — HYDROCODONE BITARTRATE AND ACETAMINOPHEN 7.5; 325 MG/1; MG/1
1 TABLET ORAL EVERY 8 HOURS PRN
Qty: 90 TABLET | Refills: 0 | Status: SHIPPED | OUTPATIENT
Start: 2020-09-08 | End: 2020-09-29

## 2020-09-08 RX ORDER — HYDROCODONE BITARTRATE AND ACETAMINOPHEN 7.5; 325 MG/1; MG/1
1 TABLET ORAL EVERY 8 HOURS PRN
Qty: 90 TABLET | Refills: 0 | Status: SHIPPED | OUTPATIENT
Start: 2020-10-08 | End: 2020-11-03

## 2020-09-08 NOTE — CHRONIC PAIN
MEDICATION EVALUATION  HISTORY OF PRESENT ILLNESS:  Braxton [place] is a 41year old old female with history of fibromyalgia, joint pain, lumbar radiculopathy who returns for medication evaluation. She states that \"today is a bad day. \" She states that col Hydroxychloroquine Sulfate 200 MG Oral Tab Take 1 tablet (200 mg total) by mouth 2 (two) times daily. 180 tablet 1   • DULoxetine HCl 20 MG Oral Cap DR Particles Take 20 mg by mouth daily. • cloNIDine HCl 0.1 MG Oral Tab Take 0.1 mg by mouth daily. INJECTION N/A 11/5/2015    Performed by Joni Lao DO at [facility] 69 / TRANSFORAMINAL EPIDURAL STEROID INJECTION N/A 9/24/2015    Performed by Joni Lao DO at [place] [address] / TRANSFORAMINAL EPIDU file        Attends Evangelical service: Not on file        Active member of club or organization: Not on file        Attends meetings of clubs or organizations: Not on file        Relationship status: Not on file      Intimate partner violence:        Fear imaging parameters to optimize visualization of suspected pathology.   Images were performed before and after the administration of intravenous gadolinium   contrast.       FINDINGS:          NUMERATION: For the purposes of this examination, the lowest full Trace free fluid in the pelvis, likely physiologic. 4. Lesser incidental findings as above.         Dictated by (CST): Alfredo Murray MD on 1/11/2020 at 9:22       Approved by (CST): Alfredo Murray MD on 1/11/2020 at 9:27           [address] [place] [address] disc/facet abnormality, spinal stenosis, or foraminal stenosis. C6-C7:  No significant disc/facet abnormality, spinal stenosis, or foraminal stenosis.     C7-T1:  Mild asymmetric right uncovertebral joint hypertrophy and facet arthropathy, which results addiction, respiratory depression, and death.  Patient advised not to consume ETOH or operating heavy machinery or vehicles while taking this medication.  Patient verbalized understanding.    Patients medication, dose, instructions, refill dates, and pharm

## 2020-09-08 NOTE — PROGRESS NOTES
Pt presents to [facility] ambulatory for medication fill. Pt states has been stable until today. Pt states increased pain due to the weather. Pt is having sensativity to her skin. Pt takes Norco 7/325mg 3 pills last dose was last night.  ZOHRA Chavarria to s

## 2020-09-23 RX ORDER — HYDROXYCHLOROQUINE SULFATE 200 MG/1
TABLET, FILM COATED ORAL
Qty: 180 TABLET | Refills: 1 | Status: SHIPPED | OUTPATIENT
Start: 2020-09-23 | End: 2021-03-19

## 2020-09-23 NOTE — TELEPHONE ENCOUNTER
Last filled: 12/26/19 #180 tab with 1 refill   LOV: 5/14/20   Future Appointments   Date Time Provider Kirsten Hendrickson   9/29/2020  3:20 PM Alvena Hane, MD [facility] [place]   11/6/2020  1:00 PM ANOOP [facility] PAIN [facility]     Labs: 6/

## 2020-09-29 ENCOUNTER — OFFICE VISIT (OUTPATIENT)
Dept: OBGYN CLINIC | Facility: CLINIC | Age: 45
End: 2020-09-29
Payer: COMMERCIAL

## 2020-09-29 VITALS
SYSTOLIC BLOOD PRESSURE: 144 MMHG | DIASTOLIC BLOOD PRESSURE: 79 MMHG | WEIGHT: 151 LBS | BODY MASS INDEX: 25 KG/M2 | HEART RATE: 102 BPM

## 2020-09-29 DIAGNOSIS — E28.319 EARLY MENOPAUSE OCCURRING IN PATIENT AGE YOUNGER THAN 45 YEARS: Primary | ICD-10-CM

## 2020-09-29 DIAGNOSIS — R68.82 LOW LIBIDO: ICD-10-CM

## 2020-09-29 PROCEDURE — 3078F DIAST BP <80 MM HG: CPT | Performed by: OBSTETRICS & GYNECOLOGY

## 2020-09-29 PROCEDURE — 99212 OFFICE O/P EST SF 10 MIN: CPT | Performed by: OBSTETRICS & GYNECOLOGY

## 2020-09-29 PROCEDURE — 3077F SYST BP >= 140 MM HG: CPT | Performed by: OBSTETRICS & GYNECOLOGY

## 2020-09-29 RX ORDER — ATOMOXETINE 80 MG/1
CAPSULE ORAL
COMMUNITY
Start: 2020-09-26

## 2020-09-29 RX ORDER — LITHIUM CARBONATE 150 MG/1
CAPSULE ORAL
COMMUNITY
Start: 2020-09-23 | End: 2022-01-18

## 2020-09-29 NOTE — PROGRESS NOTES
Daniel Evans    7/20/1975       Patient presents with:  Gyn Problem: low libido  She went thru early menopause 2 years ago and c/o no sexual desire. Pt states that her libido was low even before menopause.   She states she wants to in her head but her problem. Diagnoses and all orders for this visit:    Early menopause occurring in patient age younger than 39 years    Low libido    pt will investigate homeopathic MD's that may offer hormone pellets- she has heard of this from her friends.   She may co

## 2020-11-06 ENCOUNTER — OFFICE VISIT (OUTPATIENT)
Dept: PAIN CLINIC | Facility: HOSPITAL | Age: 45
End: 2020-11-06
Attending: NURSE PRACTITIONER
Payer: COMMERCIAL

## 2020-11-06 VITALS
WEIGHT: 145 LBS | HEIGHT: 62 IN | SYSTOLIC BLOOD PRESSURE: 124 MMHG | BODY MASS INDEX: 26.68 KG/M2 | HEART RATE: 97 BPM | DIASTOLIC BLOOD PRESSURE: 84 MMHG

## 2020-11-06 DIAGNOSIS — M96.1 FAILED BACK SURGICAL SYNDROME: ICD-10-CM

## 2020-11-06 DIAGNOSIS — G89.29 CHRONIC NECK PAIN: ICD-10-CM

## 2020-11-06 DIAGNOSIS — M54.2 CHRONIC NECK PAIN: ICD-10-CM

## 2020-11-06 DIAGNOSIS — M43.10 SPONDYLOLISTHESIS, GRADE 1: ICD-10-CM

## 2020-11-06 DIAGNOSIS — M79.7 FIBROMYALGIA: ICD-10-CM

## 2020-11-06 DIAGNOSIS — M54.16 LUMBAR RADICULOPATHY, CHRONIC: Primary | ICD-10-CM

## 2020-11-06 DIAGNOSIS — M54.40 CHRONIC LOW BACK PAIN WITH SCIATICA, SCIATICA LATERALITY UNSPECIFIED, UNSPECIFIED BACK PAIN LATERALITY: ICD-10-CM

## 2020-11-06 DIAGNOSIS — M43.06 LUMBAR SPONDYLOLYSIS: ICD-10-CM

## 2020-11-06 DIAGNOSIS — G89.29 CHRONIC LOW BACK PAIN WITH SCIATICA, SCIATICA LATERALITY UNSPECIFIED, UNSPECIFIED BACK PAIN LATERALITY: ICD-10-CM

## 2020-11-06 RX ORDER — HYDROCODONE BITARTRATE AND ACETAMINOPHEN 7.5; 325 MG/1; MG/1
1 TABLET ORAL EVERY 8 HOURS PRN
Qty: 90 TABLET | Refills: 0 | Status: SHIPPED | OUTPATIENT
Start: 2020-12-07 | End: 2021-01-06

## 2020-11-06 RX ORDER — HYDROCODONE BITARTRATE AND ACETAMINOPHEN 7.5; 325 MG/1; MG/1
1 TABLET ORAL EVERY 8 HOURS PRN
Qty: 90 TABLET | Refills: 0 | Status: SHIPPED | OUTPATIENT
Start: 2020-11-07 | End: 2020-12-07

## 2020-11-06 NOTE — CHRONIC PAIN
MEDICATION EVALUATION  HISTORY OF PRESENT ILLNESS:  Adam Sorto is a 41year old old female with history of fibromyalgia, joint pain, lumbar radiculopathy who returns for medication evaluation. She states that that she is feeling pretty good today.  Her tablet (50 mg total) by mouth every 8 (eight) hours as needed for Pain. 90 tablet 2   • DULoxetine HCl 20 MG Oral Cap DR Particles Take 20 mg by mouth daily. • cloNIDine HCl 0.1 MG Oral Tab Take 0.1 mg by mouth daily.      • topiramate 50 MG Oral Tab Ta R, DO at [facility] 69 / TRANSFORAMINAL EPIDURAL STEROID INJECTION N/A 9/24/2015    Performed by Hua Crespo, DO at [facility] 69 / TRANSFORAMINAL EPIDURAL STEROID INJECTION N/A 8/28/2015    Performed by Yovani Garnica Active member of club or organization: Not on file        Attends meetings of clubs or organizations: Not on file        Relationship status: Not on file      Intimate partner violence        Fear of current or ex partner: Not on file        Emotionally ab pathology.   Images were performed before and after the administration of intravenous gadolinium   contrast.       FINDINGS:          NUMERATION: For the purposes of this examination, the lowest fully formed disc space is designated as L5-S1.  ALIGNMENT: 4. Lesser incidental findings as above.         Dictated by (CST): Julianna Vazquez MD on 1/11/2020 at 9:22       Approved by (CST): Julianna Vazquez MD on 1/11/2020 at 9:27           [facility]  Department of Radiology  [place] [address] stenosis. C6-C7:  No significant disc/facet abnormality, spinal stenosis, or foraminal stenosis. C7-T1:  Mild asymmetric right uncovertebral joint hypertrophy and facet arthropathy, which results in mild right neural foraminal stenosis.   No additiona respiratory depression, and death.  Patient advised not to consume ETOH or operating heavy machinery or vehicles while taking this medication.  Patient verbalized understanding.    Patients medication, dose, instructions, refill dates, and pharmacy verifie

## 2020-11-06 NOTE — PROGRESS NOTES
Patient presents to [facility] for Med Eval. Patient reports pain level 7/10. Seeing Figueroa العلي. Refer to notes for POC. HPI:    Patient ID: Ibis Montero is a 41year old female.     HPI    Review of Systems         Current Outpatient Medications   Me HYDROcodone-acetaminophen 7.5-325 MG Oral Tab 90 tablet 0     Sig: Take 1 tablet by mouth every 8 (eight) hours as needed for Pain (MAX 3/DAY).        Imaging & Referrals:  None       MI#[id number]

## 2020-12-08 RX ORDER — TRAMADOL HYDROCHLORIDE 50 MG/1
TABLET ORAL
Qty: 90 TABLET | Refills: 0 | Status: SHIPPED | OUTPATIENT
Start: 2020-12-08 | End: 2021-01-06

## 2020-12-31 RX ORDER — LEVOTHYROXINE SODIUM 0.03 MG/1
TABLET ORAL
Qty: 90 TABLET | Refills: 0 | Status: SHIPPED | OUTPATIENT
Start: 2020-12-31 | End: 2021-03-22

## 2021-01-07 ENCOUNTER — OFFICE VISIT (OUTPATIENT)
Dept: PAIN CLINIC | Facility: HOSPITAL | Age: 46
End: 2021-01-07
Attending: NURSE PRACTITIONER
Payer: COMMERCIAL

## 2021-01-07 VITALS — SYSTOLIC BLOOD PRESSURE: 128 MMHG | HEART RATE: 92 BPM | OXYGEN SATURATION: 99 % | DIASTOLIC BLOOD PRESSURE: 83 MMHG

## 2021-01-07 DIAGNOSIS — M43.06 LUMBAR SPONDYLOLYSIS: ICD-10-CM

## 2021-01-07 DIAGNOSIS — M54.16 LUMBAR RADICULOPATHY, CHRONIC: ICD-10-CM

## 2021-01-07 DIAGNOSIS — M54.2 CHRONIC NECK PAIN: ICD-10-CM

## 2021-01-07 DIAGNOSIS — M79.7 FIBROMYALGIA: ICD-10-CM

## 2021-01-07 DIAGNOSIS — M54.40 CHRONIC LOW BACK PAIN WITH SCIATICA, SCIATICA LATERALITY UNSPECIFIED, UNSPECIFIED BACK PAIN LATERALITY: ICD-10-CM

## 2021-01-07 DIAGNOSIS — Z79.891 ENCOUNTER FOR MONITORING OPIOID MAINTENANCE THERAPY: Primary | ICD-10-CM

## 2021-01-07 DIAGNOSIS — Z51.81 ENCOUNTER FOR MONITORING OPIOID MAINTENANCE THERAPY: Primary | ICD-10-CM

## 2021-01-07 DIAGNOSIS — G89.29 CHRONIC LOW BACK PAIN WITH SCIATICA, SCIATICA LATERALITY UNSPECIFIED, UNSPECIFIED BACK PAIN LATERALITY: ICD-10-CM

## 2021-01-07 DIAGNOSIS — M96.1 FAILED BACK SURGICAL SYNDROME: ICD-10-CM

## 2021-01-07 DIAGNOSIS — G89.29 CHRONIC NECK PAIN: ICD-10-CM

## 2021-01-07 LAB
AMPHET UR QL SCN: NEGATIVE
BARBITURATES UR QL SCN: NEGATIVE
BENZODIAZ UR QL SCN: NEGATIVE
CANNABINOIDS UR QL SCN: NEGATIVE
COCAINE UR QL: NEGATIVE
CREAT UR-SCNC: 137 MG/DL
MDMA UR QL SCN: NEGATIVE
METHADONE UR QL SCN: NEGATIVE
OXYCODONE UR QL SCN: NEGATIVE
PCP UR QL SCN: NEGATIVE

## 2021-01-07 PROCEDURE — 99212 OFFICE O/P EST SF 10 MIN: CPT

## 2021-01-07 PROCEDURE — 80307 DRUG TEST PRSMV CHEM ANLYZR: CPT | Performed by: NURSE PRACTITIONER

## 2021-01-07 PROCEDURE — 80361 OPIATES 1 OR MORE: CPT | Performed by: NURSE PRACTITIONER

## 2021-01-07 RX ORDER — HYDROCODONE BITARTRATE AND ACETAMINOPHEN 7.5; 325 MG/1; MG/1
1 TABLET ORAL EVERY 8 HOURS PRN
Qty: 90 TABLET | Refills: 0 | Status: SHIPPED | OUTPATIENT
Start: 2021-02-06 | End: 2021-03-05

## 2021-01-07 RX ORDER — TRAMADOL HYDROCHLORIDE 50 MG/1
50 TABLET ORAL EVERY 8 HOURS PRN
Qty: 90 TABLET | Refills: 1 | Status: SHIPPED | OUTPATIENT
Start: 2021-01-07 | End: 2021-03-08

## 2021-01-07 RX ORDER — HYDROCODONE BITARTRATE AND ACETAMINOPHEN 7.5; 325 MG/1; MG/1
1 TABLET ORAL EVERY 8 HOURS PRN
Qty: 90 TABLET | Refills: 0 | Status: SHIPPED | OUTPATIENT
Start: 2021-01-07 | End: 2021-02-06

## 2021-01-07 NOTE — CHRONIC PAIN
MEDICATION EVALUATION  HISTORY OF PRESENT ILLNESS:  Cara Hong is a 41year old old female with history of fibromyalgia, joint pain, lumbar radiculopathy who returns for medication evaluation. She reports worsening pain with the weather change.   Her pa topiramate 50 MG Oral Tab Take 100 mg by mouth nightly. Take 2 tablets at night. • lamoTRIgine 100 MG Oral Tab Take 100 mg by mouth daily. • Lithium Carbonate 300 MG Oral Cap Take 450 mg by mouth daily.     0   • Cholecalciferol (VITAMIN D) 2000 u [place], [facility]   • REMOVAL GALLBLADDER         FAMILY HISTORY:  Family History   Problem Relation Age of Onset   • Thyroid disease Mother         Goiter sugtravis   • Heart Disorder Mother    • Heart Disease Mother    • Thyroid disease Maternal Grandmother    • abused: Not on file        Forced sexual activity: Not on file    Other Topics      Concerns:        Not on file    Social History Narrative      Not on file    ADVANCE CARE PLANNING:    The patient did not wish/unable to name a surrogate decision maker. lumbar lordosis. BONES:             Postsurgical changes of L5-S1 anterior lumbar fusion with resultant susceptibility artifacts limiting assessment. No acute fracture.   CORD/CAUDA EQUINA:            The distal cord and nerve roots have normal caliber, c    Name: Esperanza Love Dr: Yaneth Tinoco MD  : 1975    Age/Sex: 38year old Female  Pt.  Phone: [phone]  Exam Date: 2020  Procedure: MRI SPINE CERVICAL (W+WO) (CPT=72156)   ------------------------------------------------------- =====  CONCLUSION:   1. Mild degenerative changes at C7-T1, which result in mild right neural foraminal stenosis at this level. No additional significant degenerative change or neural compromise throughout the cervical spine.   No suspicious postcontrast sent to pharmacy.    Pt will return to clinic for follow up in two months   Total Time: 25 minutes

## 2021-01-07 NOTE — PROGRESS NOTES
PT presents ambulatory to the [facility]. PT states pain increases with current winter conditions. N/A and UDS given. ZOHRA Mcdonald saw PT for med Eval. See notes for POC.

## 2021-01-11 LAB
OPIATES, UR, 6-ACETYLMORPHINE: <10 NG/ML
OPIATES, URINE, CODEINE: <20 NG/ML
OPIATES, URINE, HYDROCODONE: 1001 NG/ML
OPIATES, URINE, HYDROMORPHONE: 33 NG/ML
OPIATES, URINE, MORPHINE: <20 NG/ML
OPIATES, URINE, NORHYDROCODONE: 3796 NG/ML
OPIATES, URINE, NOROXYCODONE: <20 NG/ML
OPIATES, URINE, NOROXYMORPHONE: <20 NG/ML
OPIATES, URINE, OXYCODONE: <20 NG/ML
OPIATES, URINE, OXYMORPHONE: <20 NG/ML

## 2021-03-05 ENCOUNTER — TELEPHONE (OUTPATIENT)
Dept: PAIN CLINIC | Facility: HOSPITAL | Age: 46
End: 2021-03-05

## 2021-03-05 RX ORDER — HYDROCODONE BITARTRATE AND ACETAMINOPHEN 7.5; 325 MG/1; MG/1
1 TABLET ORAL EVERY 8 HOURS PRN
Qty: 90 TABLET | Refills: 0 | Status: SHIPPED | OUTPATIENT
Start: 2021-03-08 | End: 2021-04-06

## 2021-03-08 RX ORDER — TRAMADOL HYDROCHLORIDE 50 MG/1
TABLET ORAL
Qty: 90 TABLET | Refills: 0 | Status: SHIPPED | OUTPATIENT
Start: 2021-03-08 | End: 2021-04-06

## 2021-03-19 RX ORDER — HYDROXYCHLOROQUINE SULFATE 200 MG/1
TABLET, FILM COATED ORAL
Qty: 180 TABLET | Refills: 0 | Status: SHIPPED | OUTPATIENT
Start: 2021-03-19 | End: 2021-06-30

## 2021-03-19 NOTE — TELEPHONE ENCOUNTER
Requested Prescriptions     Pending Prescriptions Disp Refills   • HYDROXYCHLOROQUINE SULFATE 200 MG Oral Tab [Pharmacy Med Name: Hydroxychloroquine Sulfate 200 Mg Tab Sunp] 180 tablet 0     Sig: TAKE ONE TABLET BY MOUTH TWICE DAILY     LF: 9/23/20 #180 TA 15 - 37 U/L 19   ALKALINE PHOSPHATASE      37 - 98 U/L 110 (H)   Total Bilirubin      0.1 - 2.0 mg/dL 0.4   TOTAL PROTEIN      6.4 - 8.2 g/dL 6.7   Albumin      3.4 - 5.0 g/dL 3.7   Globulin      2.8 - 4.4 g/dL 3.0   A/G Ratio      1.0 - 2.0 1.2   Patien

## 2021-03-22 RX ORDER — LEVOTHYROXINE SODIUM 0.03 MG/1
TABLET ORAL
Qty: 90 TABLET | Refills: 0 | Status: SHIPPED | OUTPATIENT
Start: 2021-03-22 | End: 2021-06-30

## 2021-04-06 ENCOUNTER — OFFICE VISIT (OUTPATIENT)
Dept: PAIN CLINIC | Facility: HOSPITAL | Age: 46
End: 2021-04-06
Attending: ANESTHESIOLOGY
Payer: COMMERCIAL

## 2021-04-06 VITALS
DIASTOLIC BLOOD PRESSURE: 79 MMHG | BODY MASS INDEX: 26.68 KG/M2 | SYSTOLIC BLOOD PRESSURE: 117 MMHG | HEIGHT: 62 IN | WEIGHT: 145 LBS | RESPIRATION RATE: 18 BRPM | HEART RATE: 101 BPM

## 2021-04-06 DIAGNOSIS — Z51.81 ENCOUNTER FOR MONITORING OPIOID MAINTENANCE THERAPY: Primary | ICD-10-CM

## 2021-04-06 DIAGNOSIS — Z79.891 ENCOUNTER FOR MONITORING OPIOID MAINTENANCE THERAPY: Primary | ICD-10-CM

## 2021-04-06 DIAGNOSIS — M54.16 LUMBAR RADICULOPATHY, CHRONIC: ICD-10-CM

## 2021-04-06 DIAGNOSIS — M79.7 FIBROMYALGIA: ICD-10-CM

## 2021-04-06 DIAGNOSIS — M54.2 CHRONIC NECK PAIN: ICD-10-CM

## 2021-04-06 DIAGNOSIS — G89.29 CHRONIC NECK PAIN: ICD-10-CM

## 2021-04-06 PROCEDURE — 99211 OFF/OP EST MAY X REQ PHY/QHP: CPT

## 2021-04-06 RX ORDER — TRAMADOL HYDROCHLORIDE 50 MG/1
50 TABLET ORAL EVERY 8 HOURS PRN
Qty: 90 TABLET | Refills: 1 | Status: SHIPPED | OUTPATIENT
Start: 2021-04-07 | End: 2021-05-07

## 2021-04-06 RX ORDER — HYDROCODONE BITARTRATE AND ACETAMINOPHEN 7.5; 325 MG/1; MG/1
1 TABLET ORAL EVERY 8 HOURS PRN
Qty: 90 TABLET | Refills: 0 | Status: SHIPPED | OUTPATIENT
Start: 2021-04-07 | End: 2021-04-13

## 2021-04-06 RX ORDER — HYDROCODONE BITARTRATE AND ACETAMINOPHEN 7.5; 325 MG/1; MG/1
1 TABLET ORAL EVERY 8 HOURS PRN
Qty: 90 TABLET | Refills: 0 | Status: SHIPPED | OUTPATIENT
Start: 2021-05-07 | End: 2021-05-21

## 2021-04-06 NOTE — CHRONIC PAIN
MEDICATION EVALUATION  HISTORY OF PRESENT ILLNESS:  Beverly Merrill is a 41year old old female with history of fibromyalgia, joint pain, lumbar radiculopathy who returns for medication evaluation. She reports worsening pain with the weather change.   Her pa daily. 42.5 g 0   • busPIRone HCl 15 MG Oral Tab      • DULoxetine HCl 20 MG Oral Cap DR Particles Take 20 mg by mouth daily. • cloNIDine HCl 0.1 MG Oral Tab Take 0.1 mg by mouth daily. • topiramate 50 MG Oral Tab Take 100 mg by mouth nightly.  Take 9/24/2015    Performed by Santi Rodriguez DO at [facility]   • LUMBAR / TRANSFORAMINAL EPIDURAL STEROID INJECTION N/A 8/28/2015    Performed by Santi Rodriguez DO at [address] [place] [address]   • REMOVAL GALLBLADDER         FAMILY HISTORY: Friends and Family:       Frequency of Social Gatherings with Friends and Family:       Attends Rastafarian Services:       Active Member of Clubs or Organizations:       Attends Club or Organization Meetings:       Marital Status:   Intimate Partner Maki Mckeon gadolinium   contrast.       FINDINGS:          NUMERATION: For the purposes of this examination, the lowest fully formed disc space is designated as L5-S1.  ALIGNMENT:    There is preservation of the expected lumbar lordosis.   BONES:             Postsurgi on 1/11/2020 at 9:22       Approved by (CST): Doe Hernandez MD on 1/11/2020 at 9:27           [facility]  Department of Radiology  [address] [place] [address]  [phone]      Name: Christi Beckham Dr: Jaimie Chance stenosis. C7-T1:  Mild asymmetric right uncovertebral joint hypertrophy and facet arthropathy, which results in mild right neural foraminal stenosis. No additional significant neural compromise.              =====  CONCLUSION:   1.  Mild degenerative ch addiction, respiratory depression, and death.  Patient advised not to consume ETOH or operating heavy machinery or vehicles while taking this medication.  Patient verbalized understanding.    Patients medication, dose, instructions, refill dates, and pharm

## 2021-04-06 NOTE — PROGRESS NOTES
4/6/2021-Presents ambulatory to CPM;  Medical management chronic NECK/BACK, JOINT  PAIN;  Has been our patient since since 12/20/2017; hx of Fibromyalgia dx 2015, more recent Dx of Lupus;  She has chronic low back pain and a December 2015 underwent an L5-S1

## 2021-04-13 ENCOUNTER — OFFICE VISIT (OUTPATIENT)
Dept: FAMILY MEDICINE CLINIC | Facility: CLINIC | Age: 46
End: 2021-04-13
Payer: COMMERCIAL

## 2021-04-13 VITALS
HEART RATE: 106 BPM | WEIGHT: 152 LBS | DIASTOLIC BLOOD PRESSURE: 70 MMHG | OXYGEN SATURATION: 98 % | HEIGHT: 62 IN | BODY MASS INDEX: 27.97 KG/M2 | SYSTOLIC BLOOD PRESSURE: 110 MMHG

## 2021-04-13 DIAGNOSIS — F41.1 ANXIETY STATE: ICD-10-CM

## 2021-04-13 DIAGNOSIS — Z00.00 WELLNESS EXAMINATION: Primary | ICD-10-CM

## 2021-04-13 DIAGNOSIS — Z00.00 HEALTHCARE MAINTENANCE: ICD-10-CM

## 2021-04-13 DIAGNOSIS — M79.7 FIBROMYALGIA: ICD-10-CM

## 2021-04-13 DIAGNOSIS — F17.210 CIGARETTE SMOKER: ICD-10-CM

## 2021-04-13 DIAGNOSIS — Z12.31 ENCOUNTER FOR SCREENING MAMMOGRAM FOR MALIGNANT NEOPLASM OF BREAST: ICD-10-CM

## 2021-04-13 DIAGNOSIS — M54.16 LUMBAR RADICULOPATHY, CHRONIC: ICD-10-CM

## 2021-04-13 PROCEDURE — 80050 GENERAL HEALTH PANEL: CPT | Performed by: FAMILY MEDICINE

## 2021-04-13 PROCEDURE — 99406 BEHAV CHNG SMOKING 3-10 MIN: CPT | Performed by: FAMILY MEDICINE

## 2021-04-13 PROCEDURE — 3074F SYST BP LT 130 MM HG: CPT | Performed by: FAMILY MEDICINE

## 2021-04-13 PROCEDURE — 80061 LIPID PANEL: CPT | Performed by: FAMILY MEDICINE

## 2021-04-13 PROCEDURE — 99396 PREV VISIT EST AGE 40-64: CPT | Performed by: FAMILY MEDICINE

## 2021-04-13 PROCEDURE — 3078F DIAST BP <80 MM HG: CPT | Performed by: FAMILY MEDICINE

## 2021-04-13 PROCEDURE — 85060 BLOOD SMEAR INTERPRETATION: CPT | Performed by: FAMILY MEDICINE

## 2021-04-13 PROCEDURE — 3008F BODY MASS INDEX DOCD: CPT | Performed by: FAMILY MEDICINE

## 2021-04-13 PROCEDURE — 36415 COLL VENOUS BLD VENIPUNCTURE: CPT | Performed by: FAMILY MEDICINE

## 2021-04-13 RX ORDER — QUETIAPINE 50 MG/1
200 TABLET, FILM COATED ORAL NIGHTLY
COMMUNITY

## 2021-04-13 NOTE — PROGRESS NOTES
CC: Annual Physical Exam    HPI:   Arturo Carbone is a 41year old female who presents for a complete physical exam.    HCM  -Diet:  Well-balanced.   -Exercise regularly with walking in treadmill  -Mental Health: stable; sees psychiatrist  -menopausal    C by mouth nightly. • traMADol HCl 50 MG Oral Tab Take 1 tablet (50 mg total) by mouth every 8 (eight) hours as needed for Pain.  Max 3/day 90 tablet 1   • [START ON 5/7/2021] HYDROcodone-acetaminophen 7.5-325 MG Oral Tab Take 1 tablet by mouth every 8 (e Mother    • Heart Disease Mother    • Other (kidney cancer) Mother    • Other (bladder cancer) Mother    • Thyroid disease Maternal Grandmother    • Colon Cancer Maternal Grandmother    • Diabetes Maternal Grandmother    • Thyroid disease Other         Unl Tympanic membrane and external ear normal.   Eyes:      Conjunctiva/sclera: Conjunctivae normal.      Pupils: Pupils are equal, round, and reactive to light. Neck:      Thyroid: No thyromegaly.    Cardiovascular:      Rate and Rhythm: Normal rate and regu 65yrs(1 of 1 - PPSV23) Never done  Tobacco Cessation Counseling 2 Years Never done  Mammogram Never done  Influenza Vaccine(1) due on 10/01/2020  Annual Physical due on 01/14/2021      The patient indicates understanding of these issues and agrees to the p

## 2021-04-27 ENCOUNTER — TELEPHONE (OUTPATIENT)
Dept: FAMILY MEDICINE CLINIC | Facility: CLINIC | Age: 46
End: 2021-04-27

## 2021-04-27 DIAGNOSIS — R71.8 ELEVATED RED BLOOD CELL COUNT: Primary | ICD-10-CM

## 2021-04-27 NOTE — TELEPHONE ENCOUNTER
----- Message from Tona Shah MD sent at 4/26/2021  6:35 AM CDT -----  Please let her know:  1) CBC nromal mild elevation in RBC. Repeat in 6mo to assure it normalized  2) TSH, CMP are ok  3) Your cholesterol panel is elevated.  This is revers

## 2021-04-29 NOTE — TELEPHONE ENCOUNTER
Called patient back;  verified. Informed of results as noted below. Aware to repeat CBC in six months to assure normalization of RBC. Will trial fish oil supplements + diet/exercise to help with cholesterol levels. No further questions.  Verbalized

## 2021-05-21 RX ORDER — TRAMADOL HYDROCHLORIDE 50 MG/1
50 TABLET ORAL EVERY 8 HOURS PRN
Qty: 90 TABLET | Refills: 1 | Status: CANCELLED | OUTPATIENT
Start: 2021-06-07 | End: 2021-07-07

## 2021-06-03 ENCOUNTER — OFFICE VISIT (OUTPATIENT)
Dept: PAIN CLINIC | Facility: HOSPITAL | Age: 46
End: 2021-06-03
Attending: FAMILY MEDICINE
Payer: COMMERCIAL

## 2021-06-03 VITALS
SYSTOLIC BLOOD PRESSURE: 122 MMHG | WEIGHT: 152 LBS | DIASTOLIC BLOOD PRESSURE: 78 MMHG | BODY MASS INDEX: 27.97 KG/M2 | HEIGHT: 62 IN | RESPIRATION RATE: 18 BRPM | HEART RATE: 72 BPM

## 2021-06-03 DIAGNOSIS — M79.7 FIBROMYALGIA: Primary | ICD-10-CM

## 2021-06-03 PROCEDURE — 99211 OFF/OP EST MAY X REQ PHY/QHP: CPT

## 2021-06-03 RX ORDER — HYDROCODONE BITARTRATE AND ACETAMINOPHEN 7.5; 325 MG/1; MG/1
1 TABLET ORAL EVERY 8 HOURS PRN
Qty: 90 TABLET | Refills: 0 | Status: SHIPPED | OUTPATIENT
Start: 2021-06-06 | End: 2021-07-06

## 2021-06-03 RX ORDER — HYDROCODONE BITARTRATE AND ACETAMINOPHEN 7.5; 325 MG/1; MG/1
1 TABLET ORAL EVERY 8 HOURS PRN
Qty: 90 TABLET | Refills: 0 | Status: SHIPPED | OUTPATIENT
Start: 2021-07-06 | End: 2021-07-29

## 2021-06-03 NOTE — CHRONIC PAIN
[facility]  Pain Consultation     HISTORY OF PRESENT ILLNESS:  Angela Hammonds is a 41year old female well known to clinic who had fibromyalgia, joint pain, SLE, lumbar radiculopathy. Patient states that her pain is 7/10 today.  She denies a HISTORY:  Past Surgical History:   Procedure Laterality Date   • BACK SURGERY  12/29/15    L5-S1 ALIF    • CHOLECYSTECTOMY     • FLUOR GID & Lydia Roach NDL/CATH SPI DX/THER NJX N/A 8/28/2015    Procedure: LUMBAR / TRANSFORAMINAL EPIDURAL STEROID INJECTION;  Mims active bleeding  Lymphatic: No current lymphedema  Allergic/Immunologic:  Negative  Musculoskeletal: As above  Neurological: As above    MEDICAL HISTORY:  Patient Active Problem List:     Acne rosacea     Anxiety state     Sciatica, right     Lumbar radicu Partners: Male    Other Topics      Concerns:        Not on file    Social History Narrative      Not on file    Social Determinants of Health  Financial Resource Strain:       Difficulty of Paying Living Expenses:   Food Insecurity:       Worried About Ru BUN 6 (L) 04/13/2021    GLU 81 04/13/2021    CA 9.4 04/13/2021     Lab Results   Component Value Date    PT 12.1 12/23/2015       [place] [facility] MONITORING PROGRAM REVIEWED  Yes      ASSESSMENT AND PLAN:    41year old old female with w/ fibromyalgi pain, therapy, and treatment options), face to face time, time spent reviewing data, obtaining patient information and discussing the care with the patients health care providers.      Pt will return to clinic  In 2 months   Total time: 22 minutes     Nat Kat

## 2021-06-03 NOTE — PROGRESS NOTES
8/8/[id number] ambulatory to CPM;  Medical management CHRONIC LBP,  R/T FBSSCERVICAL NECK PAIN WITH RADICULOPATHY, JOINT  PAIN;  Has been our patient since since 12/20/2017; hx of FBSS-2015,  Fibromyalgia dx 2015, more recent Dx of Lupus;  She has chroni

## 2021-06-07 ENCOUNTER — OFFICE VISIT (OUTPATIENT)
Dept: RHEUMATOLOGY | Facility: CLINIC | Age: 46
End: 2021-06-07
Payer: COMMERCIAL

## 2021-06-07 VITALS
HEART RATE: 52 BPM | SYSTOLIC BLOOD PRESSURE: 119 MMHG | WEIGHT: 152 LBS | HEIGHT: 62 IN | BODY MASS INDEX: 27.97 KG/M2 | DIASTOLIC BLOOD PRESSURE: 78 MMHG

## 2021-06-07 DIAGNOSIS — M35.9 UNDIFFERENTIATED CONNECTIVE TISSUE DISEASE (HCC): Primary | ICD-10-CM

## 2021-06-07 DIAGNOSIS — Z79.899 LONG-TERM USE OF PLAQUENIL: ICD-10-CM

## 2021-06-07 DIAGNOSIS — M79.7 FIBROMYALGIA: ICD-10-CM

## 2021-06-07 PROCEDURE — 3008F BODY MASS INDEX DOCD: CPT | Performed by: INTERNAL MEDICINE

## 2021-06-07 PROCEDURE — 3078F DIAST BP <80 MM HG: CPT | Performed by: INTERNAL MEDICINE

## 2021-06-07 PROCEDURE — 3074F SYST BP LT 130 MM HG: CPT | Performed by: INTERNAL MEDICINE

## 2021-06-07 PROCEDURE — 99214 OFFICE O/P EST MOD 30 MIN: CPT | Performed by: INTERNAL MEDICINE

## 2021-06-07 NOTE — PATIENT INSTRUCTIONS
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

## 2021-06-07 NOTE — PROGRESS NOTES
Douglas Bello is a 41year old female. HPI:   Patient presents with: Follow - Up  Joint Pain      42 yo F presents for f/u today 6/7/2021 questionable UCTD.  Last seen May 2020    Current Medications:  Norco 7.5 TID- managed by pain mgmt  Cymbalta 20 m aches, feeling heavy, eyes burning and face feeling warm  She also has been having low-grade fevers, on and off. There are subjective  She sleeps 9 to 10 hours a night but still feels very tired.   She does drink caffeine during the day to help her stay aw Vaginal Cream Place 0.5 g vaginally daily. 42.5 g 0     .cmed  Allergies:  No Known Allergies      ROS:   All other ROS are negative. PHYSICAL EXAM:   GEN: AAOx3, NAD  HEENT: EOMI, PERRLA, no injection or icterus, oral mucosa moist, no oral lesions.  No mmol/L 28.0   ANION GAP      0 - 18 mmol/L 6   BUN      7 - 18 mg/dL 6 (L)   CREATININE      0.55 - 1.02 mg/dL 0.79   BUN/CREAT Ratio      10.0 - 20.0 7.6 (L)   CALCIUM      8.5 - 10.1 mg/dL 9.1   CALCULATED OSMOLALITY      275 - 295 mOsm/kg 280   eGFR NON gadolinium arthrogram with MRI post arthrogram could be performed for more detailed labral assessment    ASSESSMENT/PLAN:       Myofascial pain syndrome  - She has upper back, neck and shoulder pain with tenderness to palpation  - Currently is on Flexeril,

## 2021-06-30 RX ORDER — HYDROXYCHLOROQUINE SULFATE 200 MG/1
TABLET, FILM COATED ORAL
Qty: 180 TABLET | Refills: 0 | Status: SHIPPED | OUTPATIENT
Start: 2021-06-30 | End: 2021-09-24

## 2021-06-30 RX ORDER — LEVOTHYROXINE SODIUM 0.03 MG/1
TABLET ORAL
Qty: 90 TABLET | Refills: 0 | Status: SHIPPED | OUTPATIENT
Start: 2021-06-30 | End: 2021-09-24

## 2021-06-30 NOTE — TELEPHONE ENCOUNTER
LOV: 8/8/20 RTC 1 year    No future appointments.  [employer] message sent reminding her to set up an appointment

## 2021-07-21 ENCOUNTER — LAB ENCOUNTER (OUTPATIENT)
Dept: LAB | Age: 46
End: 2021-07-21
Attending: FAMILY MEDICINE
Payer: COMMERCIAL

## 2021-07-21 ENCOUNTER — HOSPITAL ENCOUNTER (OUTPATIENT)
Dept: MAMMOGRAPHY | Age: 46
Discharge: HOME | End: 2021-07-21
Attending: FAMILY MEDICINE
Payer: COMMERCIAL

## 2021-07-21 DIAGNOSIS — M79.7 FIBROMYALGIA: ICD-10-CM

## 2021-07-21 DIAGNOSIS — Z12.31 ENCOUNTER FOR SCREENING MAMMOGRAM FOR MALIGNANT NEOPLASM OF BREAST: ICD-10-CM

## 2021-07-21 DIAGNOSIS — Z79.899 LONG-TERM USE OF PLAQUENIL: ICD-10-CM

## 2021-07-21 DIAGNOSIS — M35.9 UNDIFFERENTIATED CONNECTIVE TISSUE DISEASE (HCC): ICD-10-CM

## 2021-07-21 LAB
BILIRUB UR QL: NEGATIVE
C3 SERPL-MCNC: 145 MG/DL (ref 90–180)
C4 SERPL-MCNC: 27.9 MG/DL (ref 10–40)
COLOR UR: YELLOW
CREAT UR-SCNC: 33.8 MG/DL
CRP SERPL-MCNC: 0.32 MG/DL (ref ?–0.3)
ERYTHROCYTE [SEDIMENTATION RATE] IN BLOOD: 7 MM/HR
GLUCOSE UR-MCNC: NEGATIVE MG/DL
HGB UR QL STRIP.AUTO: NEGATIVE
KETONES UR-MCNC: NEGATIVE MG/DL
NITRITE UR QL STRIP.AUTO: NEGATIVE
PH UR: 6 [PH] (ref 5–8)
PROT UR-MCNC: 6.3 MG/DL
PROT UR-MCNC: NEGATIVE MG/DL
PROT/CREAT UR-RTO: 0.19
SP GR UR STRIP: 1.01 (ref 1–1.03)
UROBILINOGEN UR STRIP-ACNC: <2

## 2021-07-21 PROCEDURE — 84156 ASSAY OF PROTEIN URINE: CPT

## 2021-07-21 PROCEDURE — 82570 ASSAY OF URINE CREATININE: CPT

## 2021-07-21 PROCEDURE — 77063 BREAST TOMOSYNTHESIS BI: CPT | Performed by: FAMILY MEDICINE

## 2021-07-21 PROCEDURE — 77067 SCR MAMMO BI INCL CAD: CPT | Performed by: FAMILY MEDICINE

## 2021-07-21 PROCEDURE — 36415 COLL VENOUS BLD VENIPUNCTURE: CPT | Performed by: INTERNAL MEDICINE

## 2021-07-21 PROCEDURE — 81001 URINALYSIS AUTO W/SCOPE: CPT | Performed by: INTERNAL MEDICINE

## 2021-07-21 PROCEDURE — 85652 RBC SED RATE AUTOMATED: CPT | Performed by: INTERNAL MEDICINE

## 2021-07-21 PROCEDURE — 86140 C-REACTIVE PROTEIN: CPT | Performed by: INTERNAL MEDICINE

## 2021-07-21 PROCEDURE — 86160 COMPLEMENT ANTIGEN: CPT | Performed by: INTERNAL MEDICINE

## 2021-07-21 PROCEDURE — 86225 DNA ANTIBODY NATIVE: CPT

## 2021-07-23 LAB — DSDNA AB TITR SER: <10 {TITER}

## 2021-07-29 ENCOUNTER — OFFICE VISIT (OUTPATIENT)
Dept: PAIN CLINIC | Facility: HOSPITAL | Age: 46
End: 2021-07-29
Attending: NURSE PRACTITIONER
Payer: COMMERCIAL

## 2021-07-29 VITALS — HEART RATE: 97 BPM | SYSTOLIC BLOOD PRESSURE: 127 MMHG | OXYGEN SATURATION: 99 % | DIASTOLIC BLOOD PRESSURE: 82 MMHG

## 2021-07-29 DIAGNOSIS — Z79.891 ENCOUNTER FOR MONITORING OPIOID MAINTENANCE THERAPY: ICD-10-CM

## 2021-07-29 DIAGNOSIS — Z51.81 ENCOUNTER FOR MONITORING OPIOID MAINTENANCE THERAPY: ICD-10-CM

## 2021-07-29 DIAGNOSIS — M54.40 CHRONIC LOW BACK PAIN WITH SCIATICA, SCIATICA LATERALITY UNSPECIFIED, UNSPECIFIED BACK PAIN LATERALITY: ICD-10-CM

## 2021-07-29 DIAGNOSIS — M54.16 LUMBAR RADICULOPATHY, CHRONIC: ICD-10-CM

## 2021-07-29 DIAGNOSIS — G89.29 CHRONIC LOW BACK PAIN WITH SCIATICA, SCIATICA LATERALITY UNSPECIFIED, UNSPECIFIED BACK PAIN LATERALITY: ICD-10-CM

## 2021-07-29 DIAGNOSIS — M96.1 FAILED BACK SURGICAL SYNDROME: ICD-10-CM

## 2021-07-29 DIAGNOSIS — M54.2 CHRONIC NECK PAIN: ICD-10-CM

## 2021-07-29 DIAGNOSIS — G89.29 CHRONIC NECK PAIN: ICD-10-CM

## 2021-07-29 DIAGNOSIS — M43.10 SPONDYLOLISTHESIS, GRADE 1: ICD-10-CM

## 2021-07-29 DIAGNOSIS — M43.06 LUMBAR SPONDYLOLYSIS: ICD-10-CM

## 2021-07-29 DIAGNOSIS — M79.7 FIBROMYALGIA: Primary | ICD-10-CM

## 2021-07-29 PROCEDURE — 99211 OFF/OP EST MAY X REQ PHY/QHP: CPT

## 2021-07-29 RX ORDER — HYDROCODONE BITARTRATE AND ACETAMINOPHEN 7.5; 325 MG/1; MG/1
1 TABLET ORAL EVERY 8 HOURS PRN
Qty: 90 TABLET | Refills: 0 | Status: SHIPPED | OUTPATIENT
Start: 2021-08-06 | End: 2021-09-05

## 2021-07-29 RX ORDER — HYDROCODONE BITARTRATE AND ACETAMINOPHEN 7.5; 325 MG/1; MG/1
1 TABLET ORAL EVERY 8 HOURS PRN
Qty: 90 TABLET | Refills: 0 | Status: SHIPPED | OUTPATIENT
Start: 2021-09-05 | End: 2021-10-05

## 2021-07-29 NOTE — CHRONIC PAIN
[facility]   HISTORY OF PRESENT ILLNESS:  Shira Hernandez is a 40year old female well known to clinic who had fibromyalgia, joint pain, SLE, lumbar radiculopathy. She reports increased pain since stopping tramadol last visit.   She denies an 8/28/2015    Procedure: LUMBAR / TRANSFORAMINAL EPIDURAL STEROID INJECTION;  Surgeon: Marcelo Sutherland DO;  Location: [address] NDL/CATH SPI DX/THER Mina Edson Sadie 84 N/A 9/24/2015    Procedure: LUMBAR / TRANSFORAMINAL EPIDURAL CHRIS List:     Acne rosacea     Anxiety state     Sciatica, right     Lumbar radiculopathy, chronic     Lumbar spondylolysis     Spondylolisthesis, grade 1     Herniated nucleus pulposus, L5-S1     Right-sided low back pain with right-sided sciatica     Lumbosa Difficulty of Paying Living Expenses:   Food Insecurity:       Worried About [employer] in the Last Year:       Ran Out of Food in the Last Year:   Transportation Needs:       Lack of Transportation (Medical):       Lack of Transportation (Non-Med w/ fibromyalgia, SLE, radiculopathy, on chronic opioids. PLAN:  RECOMMENDATIONS:  1) Cont hydrocodone/acetaminophen 10-325mg [place]ID [place]N. [place]   Discussed with patient the risks of opioid medications including but not limited to depen

## 2021-07-29 NOTE — PROGRESS NOTES
PT presents ambulatory to the CPM.   ZOHRA Mcdonald saw PT for 2 month med eval.  See notes for POC.

## 2021-08-12 ENCOUNTER — OFFICE VISIT (OUTPATIENT)
Dept: OTOLARYNGOLOGY | Facility: CLINIC | Age: 46
End: 2021-08-12
Payer: COMMERCIAL

## 2021-08-12 VITALS — BODY MASS INDEX: 28.71 KG/M2 | HEIGHT: 62 IN | WEIGHT: 156 LBS

## 2021-08-12 DIAGNOSIS — R13.19 ESOPHAGEAL DYSPHAGIA: Primary | ICD-10-CM

## 2021-08-12 DIAGNOSIS — H60.543 DERMATITIS OF EAR CANAL, BILATERAL: ICD-10-CM

## 2021-08-12 DIAGNOSIS — G47.33 OSA (OBSTRUCTIVE SLEEP APNEA): ICD-10-CM

## 2021-08-12 DIAGNOSIS — J32.4 CHRONIC PANSINUSITIS: ICD-10-CM

## 2021-08-12 PROCEDURE — 3008F BODY MASS INDEX DOCD: CPT | Performed by: OTOLARYNGOLOGY

## 2021-08-12 PROCEDURE — 99204 OFFICE O/P NEW MOD 45 MIN: CPT | Performed by: OTOLARYNGOLOGY

## 2021-08-12 RX ORDER — DIAZEPAM 2 MG/1
2 TABLET ORAL AS NEEDED
COMMUNITY
Start: 2021-06-17

## 2021-08-12 RX ORDER — FLUOCINOLONE ACETONIDE 0.11 MG/ML
OIL AURICULAR (OTIC)
Qty: 1 EACH | Refills: 1 | Status: SHIPPED | OUTPATIENT
Start: 2021-08-12 | End: 2022-01-18

## 2021-08-12 NOTE — PROGRESS NOTES
Aguila Mathews is a 40year old female.   Patient presents with:  Sinus Problem: pt states she feels clogged up in her nose  Snoring: pt states her snoring has been very bad lately  Ear Problem: pt states her ears have been itchy over the past few weeks  Th Every Day Smoker        Packs/day: 0.50        Years: 25.00        Pack years: 12.5        Types: Cigarettes      Smokeless tobacco: Never Used      Tobacco comment: since 17y/o age; quit with pregnancies    Substance and Sexual Activity      Alcohol use: EPIDURAL STEROID INJECTION;  Surgeon: Do Morgan DO;  Location: [address] [place] [address]   • INJECTION, W/WO CONTRAST, DX/THERAPEUTIC SUBSTANCE, EPIDURAL/SUBARACHNOID; LUMBAR/SACRAL N/A 9/24/2015    Procedure: LUMBAR / Marleta Channel Normal, Left: Normal.   Skin Normal Inspection - Normal.        Lymph Detail Normal Submental. Submandibular. Anterior cervical. Posterior cervical. Supraclavicular.          Nose/Mouth/Throat Normal External nose - Normal.  Nares - Right: Normal Left: Norm PLAN  1. Esophageal dysphagia  Recommend esophagram    2.  Chronic nasal obstruction  Patent nares post septal repair turbinates are small she is tried multiple medications without any relief so I do recommend a CAT scan and allergy work-up  - CT SINUS (CPT

## 2021-08-13 ENCOUNTER — PATIENT MESSAGE (OUTPATIENT)
Dept: OTOLARYNGOLOGY | Facility: CLINIC | Age: 46
End: 2021-08-13

## 2021-08-13 DIAGNOSIS — R13.10 DYSPHAGIA, UNSPECIFIED TYPE: Primary | ICD-10-CM

## 2021-08-13 NOTE — TELEPHONE ENCOUNTER
Gabriel Wade, please see note below- per your office visit note from  Yesterday you stated \"recommend esophagram\". Is this something you place an order for or should I give the patient further info to follow up elsewhere?  Thank you

## 2021-08-13 NOTE — TELEPHONE ENCOUNTER
From: Mara Parsons  To: Irvin Waddell MD  Sent: 8/13/2021 1:39 PM CDT  Subject: Visit Follow-up Question    Dr. Cynthia Douglass mentioned in my appointment about doing some sort of endoscopy for my esophagus, but I did not see any orders for it in the p

## 2021-08-19 ENCOUNTER — TELEPHONE (OUTPATIENT)
Dept: OTOLARYNGOLOGY | Facility: CLINIC | Age: 46
End: 2021-08-19

## 2021-08-19 NOTE — TELEPHONE ENCOUNTER
The epworth sleep score is needed to submit auth. Please provide. Referral will be closed in 48 hours and can be reopened with sleep score is provided.

## 2021-09-24 RX ORDER — HYDROXYCHLOROQUINE SULFATE 200 MG/1
TABLET, FILM COATED ORAL
Qty: 180 TABLET | Refills: 0 | Status: SHIPPED | OUTPATIENT
Start: 2021-09-24 | End: 2021-12-27

## 2021-09-24 RX ORDER — LEVOTHYROXINE SODIUM 0.03 MG/1
25 TABLET ORAL
Qty: 30 TABLET | Refills: 0 | Status: SHIPPED | OUTPATIENT
Start: 2021-09-24 | End: 2021-10-25

## 2021-09-24 NOTE — TELEPHONE ENCOUNTER
Requested Prescriptions     Pending Prescriptions Disp Refills   • HYDROXYCHLOROQUINE SULFATE 200 MG Oral Tab [Pharmacy Med Name: Hydroxychloroquine Sulfate 200 Mg Tab Nort] 180 tablet 0     Sig: TAKE ONE TABLET BY MOUTH TWICE DAILY     LF; 6/30/21 #180 TA

## 2021-09-24 NOTE — TELEPHONE ENCOUNTER
LOV 08/08/20. RTC 1 year. No f/u scheduled at this time. Tried reaching out to patient to schedule an appointment, no answer lmtcb and [facility] message sent 07/09/21.     Pended 60 days supply

## 2021-10-01 ENCOUNTER — TELEPHONE (OUTPATIENT)
Dept: PAIN CLINIC | Facility: HOSPITAL | Age: 46
End: 2021-10-01

## 2021-10-18 ENCOUNTER — OFFICE VISIT (OUTPATIENT)
Dept: PAIN CLINIC | Facility: HOSPITAL | Age: 46
End: 2021-10-18
Attending: NURSE PRACTITIONER
Payer: COMMERCIAL

## 2021-10-18 VITALS — SYSTOLIC BLOOD PRESSURE: 151 MMHG | DIASTOLIC BLOOD PRESSURE: 80 MMHG | HEART RATE: 104 BPM | OXYGEN SATURATION: 99 %

## 2021-10-18 DIAGNOSIS — M43.06 LUMBAR SPONDYLOLYSIS: ICD-10-CM

## 2021-10-18 DIAGNOSIS — M96.1 FAILED BACK SURGICAL SYNDROME: ICD-10-CM

## 2021-10-18 DIAGNOSIS — Z51.81 ENCOUNTER FOR MONITORING OPIOID MAINTENANCE THERAPY: ICD-10-CM

## 2021-10-18 DIAGNOSIS — M54.40 CHRONIC LOW BACK PAIN WITH SCIATICA, SCIATICA LATERALITY UNSPECIFIED, UNSPECIFIED BACK PAIN LATERALITY: ICD-10-CM

## 2021-10-18 DIAGNOSIS — Z79.891 ENCOUNTER FOR MONITORING OPIOID MAINTENANCE THERAPY: ICD-10-CM

## 2021-10-18 DIAGNOSIS — M54.16 LUMBAR RADICULOPATHY, CHRONIC: ICD-10-CM

## 2021-10-18 DIAGNOSIS — F11.90 CHRONIC, CONTINUOUS USE OF OPIOIDS: ICD-10-CM

## 2021-10-18 DIAGNOSIS — M54.2 CHRONIC NECK PAIN: ICD-10-CM

## 2021-10-18 DIAGNOSIS — G89.29 CHRONIC NECK PAIN: ICD-10-CM

## 2021-10-18 DIAGNOSIS — G89.29 CHRONIC LOW BACK PAIN WITH SCIATICA, SCIATICA LATERALITY UNSPECIFIED, UNSPECIFIED BACK PAIN LATERALITY: ICD-10-CM

## 2021-10-18 DIAGNOSIS — M79.7 FIBROMYALGIA: Primary | ICD-10-CM

## 2021-10-18 PROCEDURE — 99211 OFF/OP EST MAY X REQ PHY/QHP: CPT

## 2021-10-18 RX ORDER — HYDROCODONE BITARTRATE AND ACETAMINOPHEN 7.5; 325 MG/1; MG/1
1 TABLET ORAL EVERY 12 HOURS PRN
Qty: 60 TABLET | Refills: 0 | Status: SHIPPED | OUTPATIENT
Start: 2021-11-17 | End: 2021-12-03

## 2021-10-18 RX ORDER — HYDROCODONE BITARTRATE AND ACETAMINOPHEN 7.5; 325 MG/1; MG/1
1 TABLET ORAL EVERY 12 HOURS
Qty: 60 TABLET | Refills: 0 | Status: SHIPPED | OUTPATIENT
Start: 2021-10-18 | End: 2021-11-17

## 2021-10-18 NOTE — CHRONIC PAIN
[facility]   HISTORY OF PRESENT ILLNESS:  Daniel Evans is a 40year old female well known to clinic who had fibromyalgia, joint pain, SLE, lumbar radiculopathy. Since we last saw her she has done ketamine infusions 5 times.  Ketamine welln Known Allergies    SURGICAL HISTORY:  Past Surgical History:   Procedure Laterality Date   • BACK SURGERY  12/29/15    L5-S1 ALIF    • CHOLECYSTECTOMY     • FLUOR GID & Raj Geraldine NDL/CATH SPI DX/THER NJX N/A 8/28/2015    Procedure: LUMBAR / TRANSFORAMINAL EPI Negative  Hematologic: No active bleeding  Lymphatic: No current lymphedema  Allergic/Immunologic:  Negative  Musculoskeletal: As above  Neurological: As above    MEDICAL HISTORY:  Patient Active Problem List:     Acne rosacea     Anxiety state     Sciatic Types: Cannabis      Sexual activity: Yes        Partners: Male    Other Topics      Concerns:        Not on file    Social History Narrative      Not on file    Social Determinants of Health  Financial Resource Strain:       Difficulty of Paying Living E - normal   Sensation of both upper extremities intact    LABS:  Lab Results   Component Value Date    WBC 10.3 04/13/2021    RBC 5.59 (H) 04/13/2021    HGB 15.1 04/13/2021    HCT 47.6 04/13/2021    MCV 85.2 04/13/2021    MCH 27.0 04/13/2021    MCHC 31.7 04 to face time, time spent reviewing data, obtaining patient information and discussing the care with the patients health care providers.      Pt will return to clinic  In 2 months   Total time: 16 minutes

## 2021-10-25 RX ORDER — LEVOTHYROXINE SODIUM 0.03 MG/1
TABLET ORAL
Qty: 30 TABLET | Refills: 0 | Status: SHIPPED | OUTPATIENT
Start: 2021-10-25 | End: 2021-11-29

## 2021-10-25 NOTE — TELEPHONE ENCOUNTER
LOV 08/08/20. RTC 1 year. No f/u scheduled at this time. Pended 30 days supply. Telephone call placed and [employer] message sent 07/09/21.

## 2021-11-29 RX ORDER — LEVOTHYROXINE SODIUM 0.03 MG/1
TABLET ORAL
Qty: 30 TABLET | Refills: 0 | Status: SHIPPED | OUTPATIENT
Start: 2021-11-29 | End: 2021-12-27

## 2021-11-29 NOTE — TELEPHONE ENCOUNTER
LOV 08/08/20. RTC 1 year. No f/u. Called for first available. LMTCB. Sent [employer] message, pended 30 day supply.

## 2021-12-17 ENCOUNTER — OFFICE VISIT (OUTPATIENT)
Dept: PAIN CLINIC | Facility: HOSPITAL | Age: 46
End: 2021-12-17
Attending: NURSE PRACTITIONER
Payer: COMMERCIAL

## 2021-12-17 VITALS — DIASTOLIC BLOOD PRESSURE: 82 MMHG | OXYGEN SATURATION: 99 % | HEART RATE: 101 BPM | SYSTOLIC BLOOD PRESSURE: 136 MMHG

## 2021-12-17 DIAGNOSIS — M43.06 LUMBAR SPONDYLOLYSIS: ICD-10-CM

## 2021-12-17 DIAGNOSIS — F11.90 CHRONIC, CONTINUOUS USE OF OPIOIDS: ICD-10-CM

## 2021-12-17 DIAGNOSIS — Z79.891 ENCOUNTER FOR MONITORING OPIOID MAINTENANCE THERAPY: Primary | ICD-10-CM

## 2021-12-17 DIAGNOSIS — Z51.81 ENCOUNTER FOR MONITORING OPIOID MAINTENANCE THERAPY: Primary | ICD-10-CM

## 2021-12-17 DIAGNOSIS — M96.1 FAILED BACK SURGICAL SYNDROME: ICD-10-CM

## 2021-12-17 DIAGNOSIS — M54.2 CHRONIC NECK PAIN: ICD-10-CM

## 2021-12-17 DIAGNOSIS — M54.16 LUMBAR RADICULOPATHY, CHRONIC: ICD-10-CM

## 2021-12-17 DIAGNOSIS — G89.29 CHRONIC LOW BACK PAIN WITH SCIATICA, SCIATICA LATERALITY UNSPECIFIED, UNSPECIFIED BACK PAIN LATERALITY: ICD-10-CM

## 2021-12-17 DIAGNOSIS — G89.29 CHRONIC NECK PAIN: ICD-10-CM

## 2021-12-17 DIAGNOSIS — M79.7 FIBROMYALGIA: ICD-10-CM

## 2021-12-17 DIAGNOSIS — M54.40 CHRONIC LOW BACK PAIN WITH SCIATICA, SCIATICA LATERALITY UNSPECIFIED, UNSPECIFIED BACK PAIN LATERALITY: ICD-10-CM

## 2021-12-17 PROCEDURE — 80307 DRUG TEST PRSMV CHEM ANLYZR: CPT | Performed by: NURSE PRACTITIONER

## 2021-12-17 PROCEDURE — 99211 OFF/OP EST MAY X REQ PHY/QHP: CPT

## 2021-12-17 RX ORDER — HYDROCODONE BITARTRATE AND ACETAMINOPHEN 7.5; 325 MG/1; MG/1
1 TABLET ORAL EVERY 12 HOURS PRN
Qty: 60 TABLET | Refills: 0 | Status: SHIPPED | OUTPATIENT
Start: 2021-12-17 | End: 2022-01-16

## 2021-12-17 RX ORDER — HYDROCODONE BITARTRATE AND ACETAMINOPHEN 7.5; 325 MG/1; MG/1
1 TABLET ORAL EVERY 12 HOURS PRN
Qty: 60 TABLET | Refills: 0 | Status: SHIPPED | OUTPATIENT
Start: 2022-01-16 | End: 2022-02-15

## 2021-12-17 NOTE — PROGRESS NOTES
PT presents ambulatory to the CPM.   NA & UDS renewed. ZOHRA Mcdonald saw PT for 2 month med eval.  See notes for POC.

## 2021-12-17 NOTE — CHRONIC PAIN
[facility]   HISTORY OF PRESENT ILLNESS:  Yrn Kothari is a 40year old female well known to clinic who had fibromyalgia, joint pain, SLE, lumbar radiculopathy.  She continues to get ketamine infusions at  [facility] in [facility] [address] 3404 units Oral Cap Take 4,000 Units by mouth. Scheduled Meds:  Continuous Infusions:  PRN Meds:.         ALLERGIES:  No Known Allergies    SURGICAL HISTORY:  Past Surgical History:   Procedure Laterality Date   • BACK SURGERY  12/29/15    L5-S1 ALIF No active ulcer, no change in B/B habits  Genitourinary:  Negative, no changes  Integumentary :  Negative  Psychiatric:  Negative  Hematologic: No active bleeding  Lymphatic: No current lymphedema  Allergic/Immunologic:  Negative  Musculoskeletal: As abov pregnancies    Substance and Sexual Activity      Alcohol use: No        Alcohol/week: 0.0 standard drinks      Drug use: Yes        Types: Cannabis      Sexual activity: Yes        Partners: Male    Other Topics      Concerns:        Not on file    [place]. Discussed with patient the risks of opioid medications including but not limited to dependence, addiction, respiratory depression, and death.   Patient advised not to consume ETOH or operating heavy machinery or vehicles while

## 2021-12-22 ENCOUNTER — OFFICE VISIT (OUTPATIENT)
Dept: ENDOCRINOLOGY CLINIC | Facility: CLINIC | Age: 46
End: 2021-12-22
Payer: COMMERCIAL

## 2021-12-22 VITALS
SYSTOLIC BLOOD PRESSURE: 133 MMHG | BODY MASS INDEX: 31 KG/M2 | DIASTOLIC BLOOD PRESSURE: 79 MMHG | HEART RATE: 108 BPM | WEIGHT: 170 LBS

## 2021-12-22 DIAGNOSIS — E03.8 SUBCLINICAL HYPOTHYROIDISM: ICD-10-CM

## 2021-12-22 DIAGNOSIS — E66.9 OBESITY (BMI 30.0-34.9): ICD-10-CM

## 2021-12-22 DIAGNOSIS — E04.2 MULTIPLE THYROID NODULES: Primary | ICD-10-CM

## 2021-12-22 PROCEDURE — 99214 OFFICE O/P EST MOD 30 MIN: CPT | Performed by: INTERNAL MEDICINE

## 2021-12-22 PROCEDURE — 3078F DIAST BP <80 MM HG: CPT | Performed by: INTERNAL MEDICINE

## 2021-12-22 PROCEDURE — 3075F SYST BP GE 130 - 139MM HG: CPT | Performed by: INTERNAL MEDICINE

## 2021-12-22 NOTE — PROGRESS NOTES
Name: Sean Villa  Date: 12/22/2021    Referring Physician: No ref. provider found    Patient presents with:   Follow - Up  Weight Gain  Fatigue      HISTORY OF PRESENT ILLNESS   Sean Villa is a 40year old female who presents for Patient presents wi continues to have mild dysphagia. Her main complaint today is persistently low libido. Previous labs did demonstrate menopause. 12/2021  She is concerned about persistent weight gain in the past year. +20lbs of weight gain over the past 3-4 months. Disp: , Rfl:   •  topiramate 50 MG Oral Tab, Take 100 mg by mouth nightly. Take 2 tablets at night., Disp: , Rfl:   •  lamoTRIgine 100 MG Oral Tab, Take 100 mg by mouth daily.   , Disp: , Rfl:   •  Cholecalciferol (VITAMIN D) 2000 units Oral Cap, Take 4,000 Surgeon: Sanchez Reddy DO;  Location: [address] [place] [address]   • INJECTION, W/WO CONTRAST, DX/THERAPEUTIC SUBSTANCE, EPIDURAL/SUBARACHNOID; LUMBAR/SACRAL N/A 8/28/2015    Procedure: LUMBAR / TRANSFORAMINAL EPIDURAL STEROID INJECTION;  Surgeon: Art Fajardo is approx 3-5%, 95-97% of nodules are benign  -Discussed recommendation to perform FNA biopsy of nodules greater than 1cm in size  -Discussed that if nodule is benign then plan to follow with yearly thyroid ultrasound  -No need for FNA, recheck US    3.  Ob

## 2021-12-27 RX ORDER — HYDROXYCHLOROQUINE SULFATE 200 MG/1
TABLET, FILM COATED ORAL
Qty: 180 TABLET | Refills: 0 | Status: SHIPPED | OUTPATIENT
Start: 2021-12-27

## 2021-12-27 NOTE — TELEPHONE ENCOUNTER
Requested Prescriptions     Pending Prescriptions Disp Refills   • HYDROXYCHLOROQUINE 200 MG Oral Tab [Pharmacy Med Name: Hydroxychloroquine Sulfate 200 Mg Tab Nort] 180 tablet 0     Sig: TAKE ONE TABLET BY MOUTH TWICE DAILY     LF: 9/24/21 #180 tab w/ 0 r

## 2021-12-31 RX ORDER — LEVOTHYROXINE SODIUM 0.03 MG/1
TABLET ORAL
Qty: 30 TABLET | Refills: 5 | Status: SHIPPED | OUTPATIENT
Start: 2021-12-31 | End: 2022-01-28

## 2022-01-18 ENCOUNTER — OFFICE VISIT (OUTPATIENT)
Dept: FAMILY MEDICINE CLINIC | Facility: CLINIC | Age: 47
End: 2022-01-18
Payer: COMMERCIAL

## 2022-01-18 VITALS
HEIGHT: 62 IN | OXYGEN SATURATION: 96 % | SYSTOLIC BLOOD PRESSURE: 112 MMHG | TEMPERATURE: 98 F | BODY MASS INDEX: 30.91 KG/M2 | WEIGHT: 168 LBS | HEART RATE: 104 BPM | DIASTOLIC BLOOD PRESSURE: 74 MMHG

## 2022-01-18 DIAGNOSIS — Z71.3 WEIGHT LOSS COUNSELING, ENCOUNTER FOR: Primary | ICD-10-CM

## 2022-01-18 PROCEDURE — 3078F DIAST BP <80 MM HG: CPT | Performed by: FAMILY MEDICINE

## 2022-01-18 PROCEDURE — 3074F SYST BP LT 130 MM HG: CPT | Performed by: FAMILY MEDICINE

## 2022-01-18 PROCEDURE — 99214 OFFICE O/P EST MOD 30 MIN: CPT | Performed by: FAMILY MEDICINE

## 2022-01-18 PROCEDURE — 3008F BODY MASS INDEX DOCD: CPT | Performed by: FAMILY MEDICINE

## 2022-01-21 NOTE — PROGRESS NOTES
HPI:   Patient presents with:  Weight Check      Daniel Martinez is a 40year old female presenting for    Struggling to lose weight  Trying to eat healthier.  Not exercising    Chronic pain and mental health are stable and improved    Results for orders p (twelve) hours as needed for Pain (max 2 per day). 60 tablet 0   • diazepam 2 MG Oral Tab Take 2 mg by mouth as needed. • QUEtiapine Fumarate 50 MG Oral Tab Take 200 mg by mouth nightly.        • Atomoxetine HCl 80 MG Oral Cap      • busPIRone HCl 15 MG EPIDURAL/SUBARACHNOID; LUMBAR/SACRAL N/A 11/5/2015    Procedure: LUMBAR / TRANSFORAMINAL EPIDURAL STEROID INJECTION;  Surgeon: Sandy Riojas DO;  Location: [address] [place] [address]   • REMOVAL GALLBLADDER       No Known Allergies   Social History:  Soc : 156 lb (70.8 kg)      Physical Exam  Vitals reviewed. Constitutional:       General: She is not in acute distress. Appearance: She is well-developed. Cardiovascular:      Rate and Rhythm: Normal rate and regular rhythm.       Heart sounds: Normal to 64yrs(1 of 2 - PPSV23) Never done  Tobacco Cessation Counseling 2 Years Never done  Colonoscopy Never done  COVID-19 Vaccine(3 - Booster for Lund Peter series) due on 09/12/2021  Influenza Vaccine(1) due on 10/01/2021  Annual Physical due on 04/13/2022  Mathew Schuelr

## 2022-01-27 ENCOUNTER — HOSPITAL ENCOUNTER (OUTPATIENT)
Dept: ULTRASOUND IMAGING | Age: 47
Discharge: HOME | End: 2022-01-27
Attending: INTERNAL MEDICINE
Payer: COMMERCIAL

## 2022-01-27 ENCOUNTER — LAB ENCOUNTER (OUTPATIENT)
Dept: LAB | Age: 47
End: 2022-01-27
Attending: INTERNAL MEDICINE
Payer: COMMERCIAL

## 2022-01-27 DIAGNOSIS — J32.4 CHRONIC PANSINUSITIS: ICD-10-CM

## 2022-01-27 DIAGNOSIS — E04.2 MULTIPLE THYROID NODULES: ICD-10-CM

## 2022-01-27 DIAGNOSIS — R71.8 ELEVATED RED BLOOD CELL COUNT: ICD-10-CM

## 2022-01-27 LAB
BASOPHILS # BLD AUTO: 0.06 X10(3) UL (ref 0–0.2)
BASOPHILS NFR BLD AUTO: 0.6 %
DEPRECATED RDW RBC AUTO: 38.9 FL (ref 35.1–46.3)
EOSINOPHIL # BLD AUTO: 0.06 X10(3) UL (ref 0–0.7)
EOSINOPHIL NFR BLD AUTO: 0.6 %
ERYTHROCYTE [DISTWIDTH] IN BLOOD BY AUTOMATED COUNT: 13 % (ref 11–15)
HCT VFR BLD AUTO: 42.1 %
HGB BLD-MCNC: 14.1 G/DL
IMM GRANULOCYTES # BLD AUTO: 0.04 X10(3) UL (ref 0–1)
IMM GRANULOCYTES NFR BLD: 0.4 %
LYMPHOCYTES # BLD AUTO: 2.34 X10(3) UL (ref 1–4)
LYMPHOCYTES NFR BLD AUTO: 23.6 %
MCH RBC QN AUTO: 27.6 PG (ref 26–34)
MCHC RBC AUTO-ENTMCNC: 33.5 G/DL (ref 31–37)
MCV RBC AUTO: 82.4 FL
MONOCYTES # BLD AUTO: 0.59 X10(3) UL (ref 0.1–1)
MONOCYTES NFR BLD AUTO: 6 %
NEUTROPHILS # BLD AUTO: 6.81 X10 (3) UL (ref 1.5–7.7)
NEUTROPHILS # BLD AUTO: 6.81 X10(3) UL (ref 1.5–7.7)
NEUTROPHILS NFR BLD AUTO: 68.8 %
PLATELET # BLD AUTO: 239 10(3)UL (ref 150–450)
RBC # BLD AUTO: 5.11 X10(6)UL
T3FREE SERPL-MCNC: 2.43 PG/ML (ref 2.4–4.2)
T4 FREE SERPL-MCNC: 0.8 NG/DL (ref 0.8–1.7)
TSI SER-ACNC: 0.61 MIU/ML (ref 0.36–3.74)
WBC # BLD AUTO: 9.9 X10(3) UL (ref 4–11)

## 2022-01-27 PROCEDURE — 86003 ALLG SPEC IGE CRUDE XTRC EA: CPT

## 2022-01-27 PROCEDURE — 84439 ASSAY OF FREE THYROXINE: CPT

## 2022-01-27 PROCEDURE — 85025 COMPLETE CBC W/AUTO DIFF WBC: CPT

## 2022-01-27 PROCEDURE — 82785 ASSAY OF IGE: CPT

## 2022-01-27 PROCEDURE — 36415 COLL VENOUS BLD VENIPUNCTURE: CPT

## 2022-01-27 PROCEDURE — 86376 MICROSOMAL ANTIBODY EACH: CPT

## 2022-01-27 PROCEDURE — 76536 US EXAM OF HEAD AND NECK: CPT | Performed by: INTERNAL MEDICINE

## 2022-01-27 PROCEDURE — 84481 FREE ASSAY (FT-3): CPT

## 2022-01-27 PROCEDURE — 84443 ASSAY THYROID STIM HORMONE: CPT

## 2022-01-28 DIAGNOSIS — E06.3 HYPOTHYROIDISM DUE TO HASHIMOTO'S THYROIDITIS: Primary | ICD-10-CM

## 2022-01-28 DIAGNOSIS — E03.8 HYPOTHYROIDISM DUE TO HASHIMOTO'S THYROIDITIS: Primary | ICD-10-CM

## 2022-01-28 LAB — THYROPEROXIDASE AB SERPL-ACNC: 1341 U/ML (ref ?–60)

## 2022-01-28 RX ORDER — LEVOTHYROXINE SODIUM 0.03 MG/1
TABLET ORAL
Qty: 38 TABLET | Refills: 5 | Status: SHIPPED | OUTPATIENT
Start: 2022-01-28

## 2022-01-31 LAB
A ALTERNATA IGE QN: <0.1 KUA/L (ref ?–0.1)
A FUMIGATUS IGE QN: <0.1 KUA/L (ref ?–0.1)
AMER SYCAMORE IGE QN: <0.1 KUA/L (ref ?–0.1)
BERMUDA GRASS IGE QN: <0.1 KUA/L (ref ?–0.1)
BOXELDER IGE QN: <0.1 KUA/L (ref ?–0.1)
C HERBARUM IGE QN: <0.1 KUA/L (ref ?–0.1)
CALIF WALNUT IGE QN: 0.13 KUA/L (ref ?–0.1)
CAT DANDER IGE QN: <0.1 KUA/L (ref ?–0.1)
CLAM IGE QN: <0.1 KUA/L (ref ?–0.1)
CMN PIGWEED IGE QN: <0.1 KUA/L (ref ?–0.1)
CODFISH IGE QN: <0.1 KUA/L (ref ?–0.1)
COMMON RAGWEED IGE QN: 0.35 KUA/L (ref ?–0.1)
CORN IGE QN: <0.1 KUA/L (ref ?–0.1)
COTTONWOOD IGE QN: <0.1 KUA/L (ref ?–0.1)
COW MILK IGE QN: <0.1 KUA/L (ref ?–0.1)
D FARINAE IGE QN: <0.1 KUA/L (ref ?–0.1)
D PTERONYSS IGE QN: <0.1 KUA/L (ref ?–0.1)
DOG DANDER IGE QN: <0.1 KUA/L (ref ?–0.1)
EGG WHITE IGE QN: <0.1 KUA/L (ref ?–0.1)
GOOSEFOOT IGE QN: <0.1 KUA/L (ref ?–0.1)
HOUSE DUST HS IGE QN: <0.1 KUA/L (ref ?–0.1)
IGE SERPL-ACNC: 8.65 KU/L (ref 2–214)
IGE SERPL-ACNC: 8.98 KU/L (ref 2–214)
KENT BLUE GRASS IGE QN: <0.1 KUA/L (ref ?–0.1)
M RACEMOSUS IGE QN: <0.1 KUA/L (ref ?–0.1)
MARSH ELDER IGE QN: <0.1 KUA/L (ref ?–0.1)
MOUSE EPITH IGE QN: <0.1 KUA/L (ref ?–0.1)
MT JUNIPER IGE QN: <0.1 KUA/L (ref ?–0.1)
P NOTATUM IGE QN: <0.1 KUA/L (ref ?–0.1)
PEANUT IGE QN: <0.1 KUA/L (ref ?–0.1)
PECAN/HICK TREE IGE QN: <0.1 KUA/L (ref ?–0.1)
PER RYE GRASS IGE QN: <0.1 KUA/L (ref ?–0.1)
ROACH IGE QN: <0.1 KUA/L (ref ?–0.1)
SALTWORT IGE QN: <0.1 KUA/L (ref ?–0.1)
SCALLOP IGE QN: <0.1 KUA/L (ref ?–0.1)
SESAME SEED IGE QN: <0.1 KUA/L (ref ?–0.1)
SHRIMP IGE QN: <0.1 KUA/L (ref ?–0.1)
SOYBEAN IGE QN: <0.1 KUA/L (ref ?–0.1)
TIMOTHY IGE QN: <0.1 KUA/L (ref ?–0.1)
WALNUT IGE QN: <0.1 KUA/L (ref ?–0.1)
WHEAT IGE QN: <0.1 KUA/L (ref ?–0.1)
WHITE ASH IGE QN: <0.1 KUA/L (ref ?–0.1)
WHITE ELM IGE QN: <0.1 KUA/L (ref ?–0.1)
WHITE MULBERRY IGE QN: <0.1 KUA/L (ref ?–0.1)
WHITE OAK IGE QN: <0.1 KUA/L (ref ?–0.1)

## 2022-02-11 ENCOUNTER — OFFICE VISIT (OUTPATIENT)
Dept: PAIN CLINIC | Facility: HOSPITAL | Age: 47
End: 2022-02-11
Attending: STUDENT IN AN ORGANIZED HEALTH CARE EDUCATION/TRAINING PROGRAM
Payer: COMMERCIAL

## 2022-02-11 VITALS
DIASTOLIC BLOOD PRESSURE: 81 MMHG | HEART RATE: 114 BPM | BODY MASS INDEX: 30.91 KG/M2 | SYSTOLIC BLOOD PRESSURE: 131 MMHG | HEIGHT: 62 IN | RESPIRATION RATE: 20 BRPM | WEIGHT: 168 LBS

## 2022-02-11 DIAGNOSIS — Z79.891 ENCOUNTER FOR MONITORING OPIOID MAINTENANCE THERAPY: Primary | ICD-10-CM

## 2022-02-11 DIAGNOSIS — Z51.81 ENCOUNTER FOR MONITORING OPIOID MAINTENANCE THERAPY: Primary | ICD-10-CM

## 2022-02-11 PROCEDURE — 99211 OFF/OP EST MAY X REQ PHY/QHP: CPT

## 2022-02-11 RX ORDER — HYDROCODONE BITARTRATE AND ACETAMINOPHEN 7.5; 325 MG/1; MG/1
1 TABLET ORAL EVERY 12 HOURS PRN
Qty: 60 TABLET | Refills: 0 | Status: SHIPPED | OUTPATIENT
Start: 2022-03-19 | End: 2022-04-18

## 2022-02-11 RX ORDER — HYDROCODONE BITARTRATE AND ACETAMINOPHEN 7.5; 325 MG/1; MG/1
1 TABLET ORAL EVERY 12 HOURS PRN
Qty: 60 TABLET | Refills: 0 | Status: SHIPPED | OUTPATIENT
Start: 2022-02-17 | End: 2022-03-19

## 2022-02-11 NOTE — PROGRESS NOTES
2/11/2022Presents ambulatory to CPM;  Medical management CHRONIC LBP,  R/T FBSS--CERVICAL NECK PAIN WITH RADICULOPATHY, JOINT  PAIN;  Has been our patient since since 12/20/2017; hx of FBSS-2015,  Fibromyalgia dx 2015, more recent Dx of Lupus; She has chronic low back pain and a December 2015 underwent an L5-S1 fusion ;  Reports her pain is     ;  Looking forward to the hot weather which helps her pain; Seen by Lux Lala;  refer to dictation for the continued POC.        NA--2/11/2022  UDS--2/11/2022    MEDICATIONS:   NORCO 7.5/325 q12h----#60

## 2022-02-14 ENCOUNTER — APPOINTMENT (OUTPATIENT)
Dept: LAB | Facility: HOSPITAL | Age: 47
End: 2022-02-14
Attending: FAMILY MEDICINE
Payer: COMMERCIAL

## 2022-02-14 LAB
AMPHET UR QL SCN: NEGATIVE
BARBITURATES UR QL SCN: NEGATIVE
BENZODIAZ UR QL SCN: NEGATIVE
CANNABINOIDS UR QL SCN: NEGATIVE
COCAINE UR QL: NEGATIVE
CREAT UR-SCNC: <13 MG/DL
MDMA UR QL SCN: NEGATIVE
METHADONE UR QL SCN: NEGATIVE
OPIATES UR QL SCN: NEGATIVE
OXYCODONE UR QL SCN: NEGATIVE
PCP UR QL SCN: NEGATIVE

## 2022-02-14 PROCEDURE — 80307 DRUG TEST PRSMV CHEM ANLYZR: CPT | Performed by: STUDENT IN AN ORGANIZED HEALTH CARE EDUCATION/TRAINING PROGRAM

## 2022-02-28 RX ORDER — HYDROXYCHLOROQUINE SULFATE 200 MG/1
TABLET, FILM COATED ORAL
Qty: 180 TABLET | Refills: 0 | Status: SHIPPED | OUTPATIENT
Start: 2022-02-28

## 2022-04-18 ENCOUNTER — TELEPHONE (OUTPATIENT)
Dept: PAIN CLINIC | Facility: HOSPITAL | Age: 47
End: 2022-04-18

## 2022-04-19 RX ORDER — HYDROCODONE BITARTRATE AND ACETAMINOPHEN 7.5; 325 MG/1; MG/1
1 TABLET ORAL EVERY 12 HOURS PRN
Qty: 16 TABLET | Refills: 0 | Status: SHIPPED | OUTPATIENT
Start: 2022-04-19 | End: 2022-04-27

## 2022-04-26 ENCOUNTER — OFFICE VISIT (OUTPATIENT)
Dept: PAIN CLINIC | Facility: HOSPITAL | Age: 47
End: 2022-04-26
Attending: ANESTHESIOLOGY
Payer: COMMERCIAL

## 2022-04-26 VITALS
SYSTOLIC BLOOD PRESSURE: 126 MMHG | RESPIRATION RATE: 18 BRPM | WEIGHT: 168 LBS | HEIGHT: 62 IN | BODY MASS INDEX: 30.91 KG/M2 | HEART RATE: 72 BPM | DIASTOLIC BLOOD PRESSURE: 76 MMHG

## 2022-04-26 DIAGNOSIS — Z79.891 ENCOUNTER FOR MONITORING OPIOID MAINTENANCE THERAPY: Primary | ICD-10-CM

## 2022-04-26 DIAGNOSIS — Z51.81 ENCOUNTER FOR MONITORING OPIOID MAINTENANCE THERAPY: Primary | ICD-10-CM

## 2022-04-26 DIAGNOSIS — M54.16 LUMBAR RADICULOPATHY, CHRONIC: ICD-10-CM

## 2022-04-26 DIAGNOSIS — M96.1 FAILED BACK SURGICAL SYNDROME: ICD-10-CM

## 2022-04-26 DIAGNOSIS — M79.7 FIBROMYALGIA: ICD-10-CM

## 2022-04-26 PROCEDURE — 99211 OFF/OP EST MAY X REQ PHY/QHP: CPT

## 2022-04-26 RX ORDER — HYDROCODONE BITARTRATE AND ACETAMINOPHEN 7.5; 325 MG/1; MG/1
1 TABLET ORAL EVERY 12 HOURS PRN
Qty: 60 TABLET | Refills: 0 | Status: SHIPPED | OUTPATIENT
Start: 2022-06-26 | End: 2022-07-26

## 2022-04-26 RX ORDER — HYDROCODONE BITARTRATE AND ACETAMINOPHEN 7.5; 325 MG/1; MG/1
1 TABLET ORAL EVERY 12 HOURS PRN
Qty: 60 TABLET | Refills: 0 | Status: SHIPPED | OUTPATIENT
Start: 2022-05-27 | End: 2022-06-26

## 2022-04-26 RX ORDER — HYDROCODONE BITARTRATE AND ACETAMINOPHEN 7.5; 325 MG/1; MG/1
1 TABLET ORAL EVERY 12 HOURS PRN
Qty: 60 TABLET | Refills: 0 | Status: SHIPPED | OUTPATIENT
Start: 2022-04-27 | End: 2022-05-27

## 2022-04-26 NOTE — PROGRESS NOTES
4/26/2022-2Presents ambulatory to CPM;  Medical management CHRONIC LBP,  R/T FBSS--CERVICAL NECK PAIN WITH RADICULOPATHY, JOINT  PAIN;  Has been our patient since since 12/20/2017; hx of FBSS-2015,  Fibromyalgia dx 2015, more recent Dx of Lupus; She has chronic low back pain and a December 2015 underwent an L5-S1 fusion ;  Reports her pain is   7/10  ;  Looking forward to the hot weather which helps her pain;states she has been in a Fibro Flare-the weather up/down, cold, dampness; Seen by Yessenia Pizano;  refer to dictation for the continued POC.        NA--2/11/2022  UDS--2/11/2022    MEDICATIONS:   NORCO 7.5/325 q12h----#60    Refills 3mos

## 2022-06-23 ENCOUNTER — OFFICE VISIT (OUTPATIENT)
Dept: SURGERY | Facility: CLINIC | Age: 47
End: 2022-06-23
Payer: COMMERCIAL

## 2022-06-23 VITALS
HEIGHT: 61.7 IN | BODY MASS INDEX: 30.99 KG/M2 | HEART RATE: 103 BPM | WEIGHT: 168.38 LBS | SYSTOLIC BLOOD PRESSURE: 140 MMHG | DIASTOLIC BLOOD PRESSURE: 88 MMHG | OXYGEN SATURATION: 98 %

## 2022-06-23 DIAGNOSIS — E66.9 OBESITY (BMI 30-39.9): ICD-10-CM

## 2022-06-23 DIAGNOSIS — E78.5 DYSLIPIDEMIA: Primary | ICD-10-CM

## 2022-06-23 DIAGNOSIS — R12 HEART BURN: ICD-10-CM

## 2022-06-23 DIAGNOSIS — E88.81 INSULIN RESISTANCE: ICD-10-CM

## 2022-06-23 DIAGNOSIS — R63.5 WEIGHT GAIN: ICD-10-CM

## 2022-06-23 PROCEDURE — 3077F SYST BP >= 140 MM HG: CPT | Performed by: INTERNAL MEDICINE

## 2022-06-23 PROCEDURE — 3079F DIAST BP 80-89 MM HG: CPT | Performed by: INTERNAL MEDICINE

## 2022-06-23 PROCEDURE — 99204 OFFICE O/P NEW MOD 45 MIN: CPT | Performed by: INTERNAL MEDICINE

## 2022-06-23 PROCEDURE — 3008F BODY MASS INDEX DOCD: CPT | Performed by: INTERNAL MEDICINE

## 2022-06-23 RX ORDER — PANTOPRAZOLE SODIUM 20 MG/1
20 TABLET, DELAYED RELEASE ORAL
Qty: 30 TABLET | Refills: 2 | Status: SHIPPED | OUTPATIENT
Start: 2022-06-23

## 2022-06-30 RX ORDER — HYDROXYCHLOROQUINE SULFATE 200 MG/1
200 TABLET, FILM COATED ORAL 2 TIMES DAILY
Qty: 60 TABLET | Refills: 0 | Status: SHIPPED | OUTPATIENT
Start: 2022-06-30

## 2022-06-30 NOTE — TELEPHONE ENCOUNTER
Last filled: 2/28/22 #180 tab with 0 refills   LOV: 6/7/21  Future Appointments   Date Time Provider Kirsten Hendrickson   7/21/2022 11:50 AM Anu Mooney MD [facility]   9/26/2022  3:00 PM Todd Bertrand MD [address] [facility]     Labs: 7/21/21

## 2022-07-21 ENCOUNTER — OFFICE VISIT (OUTPATIENT)
Dept: PAIN CLINIC | Facility: HOSPITAL | Age: 47
End: 2022-07-21
Attending: ANESTHESIOLOGY
Payer: COMMERCIAL

## 2022-07-21 VITALS — SYSTOLIC BLOOD PRESSURE: 136 MMHG | OXYGEN SATURATION: 97 % | HEART RATE: 100 BPM | DIASTOLIC BLOOD PRESSURE: 74 MMHG

## 2022-07-21 DIAGNOSIS — Z51.81 ENCOUNTER FOR MONITORING OPIOID MAINTENANCE THERAPY: Primary | ICD-10-CM

## 2022-07-21 DIAGNOSIS — Z79.891 ENCOUNTER FOR MONITORING OPIOID MAINTENANCE THERAPY: Primary | ICD-10-CM

## 2022-07-21 DIAGNOSIS — M96.1 FAILED BACK SURGICAL SYNDROME: ICD-10-CM

## 2022-07-21 DIAGNOSIS — M54.16 LUMBAR RADICULOPATHY, CHRONIC: ICD-10-CM

## 2022-07-21 PROCEDURE — 99211 OFF/OP EST MAY X REQ PHY/QHP: CPT

## 2022-07-21 RX ORDER — HYDROCODONE BITARTRATE AND ACETAMINOPHEN 7.5; 325 MG/1; MG/1
1 TABLET ORAL EVERY 12 HOURS PRN
Qty: 60 TABLET | Refills: 0 | Status: SHIPPED | OUTPATIENT
Start: 2022-09-24 | End: 2022-10-24

## 2022-07-21 RX ORDER — HYDROCODONE BITARTRATE AND ACETAMINOPHEN 7.5; 325 MG/1; MG/1
1 TABLET ORAL EVERY 12 HOURS PRN
Qty: 60 TABLET | Refills: 0 | Status: SHIPPED | OUTPATIENT
Start: 2022-08-25 | End: 2022-09-24

## 2022-07-21 RX ORDER — HYDROCODONE BITARTRATE AND ACETAMINOPHEN 7.5; 325 MG/1; MG/1
1 TABLET ORAL EVERY 12 HOURS PRN
Qty: 60 TABLET | Refills: 0 | Status: SHIPPED | OUTPATIENT
Start: 2022-07-26 | End: 2022-08-25

## 2022-07-21 NOTE — PROGRESS NOTES
PT presents ambulatory to the CPM.  Stable on medication. 6.5/10 pain today. Pian increase at the end of the day. Hot weather helps with the pain.  Dr. Denzel Jacobson saw PT for 2 month med dheeraj.  Refer to dictation for POC

## 2022-08-03 ENCOUNTER — OFFICE VISIT (OUTPATIENT)
Dept: RHEUMATOLOGY | Facility: CLINIC | Age: 47
End: 2022-08-03
Payer: COMMERCIAL

## 2022-08-03 VITALS
WEIGHT: 165 LBS | SYSTOLIC BLOOD PRESSURE: 121 MMHG | BODY MASS INDEX: 30.36 KG/M2 | HEART RATE: 97 BPM | HEIGHT: 62 IN | DIASTOLIC BLOOD PRESSURE: 78 MMHG

## 2022-08-03 DIAGNOSIS — M35.9 UNDIFFERENTIATED CONNECTIVE TISSUE DISEASE (HCC): Primary | ICD-10-CM

## 2022-08-03 DIAGNOSIS — M79.7 FIBROMYALGIA: ICD-10-CM

## 2022-08-03 DIAGNOSIS — Z79.899 LONG-TERM USE OF PLAQUENIL: ICD-10-CM

## 2022-08-03 PROCEDURE — 99214 OFFICE O/P EST MOD 30 MIN: CPT | Performed by: INTERNAL MEDICINE

## 2022-08-03 PROCEDURE — 3008F BODY MASS INDEX DOCD: CPT | Performed by: INTERNAL MEDICINE

## 2022-08-03 PROCEDURE — 3078F DIAST BP <80 MM HG: CPT | Performed by: INTERNAL MEDICINE

## 2022-08-03 PROCEDURE — 3074F SYST BP LT 130 MM HG: CPT | Performed by: INTERNAL MEDICINE

## 2022-08-03 RX ORDER — HYDROXYCHLOROQUINE SULFATE 200 MG/1
200 TABLET, FILM COATED ORAL 2 TIMES DAILY
Qty: 180 TABLET | Refills: 1 | Status: SHIPPED | OUTPATIENT
Start: 2022-08-03

## 2022-08-03 RX ORDER — TRIAMCINOLONE ACETONIDE 0.1 %
PASTE (GRAM) DENTAL
Qty: 1 EACH | Refills: 0 | Status: SHIPPED | OUTPATIENT
Start: 2022-08-03

## 2022-08-03 RX ORDER — CYCLOBENZAPRINE HCL 5 MG
5 TABLET ORAL NIGHTLY
Qty: 30 TABLET | Refills: 0 | Status: SHIPPED | OUTPATIENT
Start: 2022-08-03

## 2022-08-03 NOTE — PATIENT INSTRUCTIONS
You were seen for the myofascial pain syndrome and undifferentiated connective tissue disease  Continue Plaquenil  Try Flexeril at night to see if it will help, it is a muscle relaxer so can make you sleepy  Try the triamcinolone oral paste for your sores  Blood work today  Make sure to see the eye doctor  See me in 6 months

## 2022-09-06 RX ORDER — CYCLOBENZAPRINE HCL 5 MG
5 TABLET ORAL NIGHTLY
Qty: 30 TABLET | Refills: 0 | Status: SHIPPED | OUTPATIENT
Start: 2022-09-06

## 2022-09-07 RX ORDER — CYCLOBENZAPRINE HCL 5 MG
5 TABLET ORAL NIGHTLY
Qty: 30 TABLET | Refills: 0 | OUTPATIENT
Start: 2022-09-07

## 2022-09-12 DIAGNOSIS — R12 HEART BURN: ICD-10-CM

## 2022-09-12 RX ORDER — PANTOPRAZOLE SODIUM 20 MG/1
TABLET, DELAYED RELEASE ORAL
Qty: 30 TABLET | Refills: 0 | Status: SHIPPED | OUTPATIENT
Start: 2022-09-12

## 2022-10-10 RX ORDER — CYCLOBENZAPRINE HCL 5 MG
TABLET ORAL
Qty: 30 TABLET | Refills: 0 | Status: SHIPPED | OUTPATIENT
Start: 2022-10-10

## 2022-10-17 RX ORDER — LEVOTHYROXINE SODIUM 0.03 MG/1
TABLET ORAL
Qty: 38 TABLET | Refills: 0 | Status: SHIPPED | OUTPATIENT
Start: 2022-10-17

## 2022-10-19 ENCOUNTER — OFFICE VISIT (OUTPATIENT)
Dept: PAIN CLINIC | Facility: HOSPITAL | Age: 47
End: 2022-10-19
Attending: NURSE PRACTITIONER
Payer: COMMERCIAL

## 2022-10-19 VITALS — DIASTOLIC BLOOD PRESSURE: 83 MMHG | SYSTOLIC BLOOD PRESSURE: 142 MMHG | OXYGEN SATURATION: 97 % | HEART RATE: 108 BPM

## 2022-10-19 DIAGNOSIS — Z79.891 ENCOUNTER FOR MONITORING OPIOID MAINTENANCE THERAPY: Primary | ICD-10-CM

## 2022-10-19 DIAGNOSIS — M54.16 LUMBAR RADICULOPATHY, CHRONIC: ICD-10-CM

## 2022-10-19 DIAGNOSIS — M96.1 FAILED BACK SURGICAL SYNDROME: ICD-10-CM

## 2022-10-19 DIAGNOSIS — M79.7 FIBROMYALGIA: ICD-10-CM

## 2022-10-19 DIAGNOSIS — Z51.81 ENCOUNTER FOR MONITORING OPIOID MAINTENANCE THERAPY: Primary | ICD-10-CM

## 2022-10-19 PROBLEM — M35.9 UNDIFFERENTIATED CONNECTIVE TISSUE DISEASE (HCC): Status: ACTIVE | Noted: 2022-10-19

## 2022-10-19 PROBLEM — F52.9 SEXUAL AROUSAL DISORDER: Status: ACTIVE | Noted: 2022-10-19

## 2022-10-19 PROBLEM — N94.10 PAIN IN FEMALE GENITALIA ON INTERCOURSE: Status: ACTIVE | Noted: 2022-10-19

## 2022-10-19 PROCEDURE — 99211 OFF/OP EST MAY X REQ PHY/QHP: CPT

## 2022-10-19 RX ORDER — BUSPIRONE HYDROCHLORIDE 30 MG/1
TABLET ORAL
COMMUNITY
Start: 2022-09-23

## 2022-10-19 RX ORDER — DULOXETIN HYDROCHLORIDE 20 MG/1
CAPSULE, DELAYED RELEASE ORAL
COMMUNITY
Start: 2022-09-12

## 2022-10-19 RX ORDER — HYDROCODONE BITARTRATE AND ACETAMINOPHEN 7.5; 325 MG/1; MG/1
1 TABLET ORAL EVERY 12 HOURS PRN
Qty: 60 TABLET | Refills: 0 | Status: SHIPPED | OUTPATIENT
Start: 2022-11-25

## 2022-10-19 RX ORDER — TOPIRAMATE 100 MG/1
TABLET, FILM COATED ORAL
COMMUNITY
Start: 2022-09-11

## 2022-10-19 RX ORDER — CLONIDINE HYDROCHLORIDE 0.1 MG/1
TABLET ORAL
COMMUNITY
Start: 2022-09-11

## 2022-10-19 RX ORDER — QUETIAPINE FUMARATE 200 MG/1
TABLET, FILM COATED ORAL
COMMUNITY
Start: 2022-09-11

## 2022-10-19 RX ORDER — LAMOTRIGINE 300 MG/1
TABLET, EXTENDED RELEASE ORAL
COMMUNITY
Start: 2022-09-23

## 2022-10-19 RX ORDER — HYDROCODONE BITARTRATE AND ACETAMINOPHEN 7.5; 325 MG/1; MG/1
1 TABLET ORAL EVERY 12 HOURS PRN
Qty: 60 TABLET | Refills: 0 | Status: SHIPPED | OUTPATIENT
Start: 2022-10-26

## 2022-10-19 NOTE — PROGRESS NOTES
PT presents ambulatory to the CPM.  PT states she's doign pretty good today. 4/10 pain today after receiving ketamine last week. ZOHRA Hendrickson saw PT for med eval.  See notes for POC.

## 2022-10-27 DIAGNOSIS — R12 HEART BURN: ICD-10-CM

## 2022-10-27 RX ORDER — PANTOPRAZOLE SODIUM 20 MG/1
TABLET, DELAYED RELEASE ORAL
Qty: 30 TABLET | Refills: 0 | Status: SHIPPED | OUTPATIENT
Start: 2022-10-27

## 2022-11-14 RX ORDER — CYCLOBENZAPRINE HCL 5 MG
TABLET ORAL
Qty: 30 TABLET | Refills: 0 | Status: SHIPPED | OUTPATIENT
Start: 2022-11-14

## 2022-11-20 NOTE — TELEPHONE ENCOUNTER
Rx request for Levothyroxine 25mcg, please review and sign off if appropriate. Thank you.     Last seen: 12/22/21  Return to clinic: 1 year

## 2022-11-21 RX ORDER — LEVOTHYROXINE SODIUM 0.03 MG/1
TABLET ORAL
Qty: 38 TABLET | Refills: 0 | Status: SHIPPED | OUTPATIENT
Start: 2022-11-21

## 2022-11-25 DIAGNOSIS — R12 HEART BURN: ICD-10-CM

## 2022-11-28 RX ORDER — PANTOPRAZOLE SODIUM 20 MG/1
TABLET, DELAYED RELEASE ORAL
Qty: 30 TABLET | Refills: 0 | Status: SHIPPED | OUTPATIENT
Start: 2022-11-28

## 2022-12-16 RX ORDER — CYCLOBENZAPRINE HCL 5 MG
TABLET ORAL
Qty: 30 TABLET | Refills: 0 | Status: SHIPPED | OUTPATIENT
Start: 2022-12-16

## 2022-12-16 NOTE — TELEPHONE ENCOUNTER
Medication Quantity Refills Start End   CYCLOBENZAPRINE 5 MG Oral Tab 30 tablet 0 11/14/2022      LOV: 8/3/22  Future Appointments   Date Time Provider Kirsten Hendrickson   12/19/2022  9:40 AM ANOOP [facility] PA[facility]      Instructions    You were seen for the myofascial pain syndrome and undifferentiated connective tissue disease  Continue Plaquenil  Try Flexeril at night to see if it will help, it is a muscle relaxer so can make you sleepy  Try the triamcinolone oral paste for your sores  Blood work today  Make sure to see the eye doctor  See me in 6 months

## 2022-12-19 ENCOUNTER — TELEPHONE (OUTPATIENT)
Dept: PAIN CLINIC | Facility: HOSPITAL | Age: 47
End: 2022-12-19

## 2022-12-19 ENCOUNTER — OFFICE VISIT (OUTPATIENT)
Dept: PAIN CLINIC | Facility: HOSPITAL | Age: 47
End: 2022-12-19
Attending: NURSE PRACTITIONER
Payer: COMMERCIAL

## 2022-12-19 VITALS — HEART RATE: 104 BPM | OXYGEN SATURATION: 98 % | DIASTOLIC BLOOD PRESSURE: 84 MMHG | SYSTOLIC BLOOD PRESSURE: 128 MMHG

## 2022-12-19 DIAGNOSIS — G89.29 CHRONIC NECK PAIN: ICD-10-CM

## 2022-12-19 DIAGNOSIS — Z51.81 ENCOUNTER FOR MONITORING OPIOID MAINTENANCE THERAPY: Primary | ICD-10-CM

## 2022-12-19 DIAGNOSIS — M96.1 FAILED BACK SURGICAL SYNDROME: ICD-10-CM

## 2022-12-19 DIAGNOSIS — M79.7 FIBROMYALGIA: ICD-10-CM

## 2022-12-19 DIAGNOSIS — M54.2 CHRONIC NECK PAIN: ICD-10-CM

## 2022-12-19 DIAGNOSIS — Z79.891 ENCOUNTER FOR MONITORING OPIOID MAINTENANCE THERAPY: Primary | ICD-10-CM

## 2022-12-19 DIAGNOSIS — M54.16 LUMBAR RADICULOPATHY, CHRONIC: ICD-10-CM

## 2022-12-19 RX ORDER — HYDROCODONE BITARTRATE AND ACETAMINOPHEN 7.5; 325 MG/1; MG/1
1 TABLET ORAL EVERY 12 HOURS PRN
Qty: 60 TABLET | Refills: 0 | Status: SHIPPED | OUTPATIENT
Start: 2023-01-23 | End: 2023-02-22

## 2022-12-19 RX ORDER — HYDROCODONE BITARTRATE AND ACETAMINOPHEN 7.5; 325 MG/1; MG/1
1 TABLET ORAL EVERY 12 HOURS PRN
Qty: 60 TABLET | Refills: 0 | Status: SHIPPED | OUTPATIENT
Start: 2022-12-24 | End: 2023-01-23

## 2022-12-19 NOTE — PROGRESS NOTES
PT presents ambulatory to the CPM.  PT states she's doign pretty good today \"this ketamine is really working out\". 5/10 pain today. Pain increases with cold weather and activity. APN Rod Baumgarten saw PT for med eval.  See notes for POC.

## 2022-12-20 NOTE — TELEPHONE ENCOUNTER
Cervical ROBER C6/7 - Cpt 62730 - Dx M50.30, M48.02, M46.92    STATUS: Authorized    AIM online, request above is authorized. Authorization # [id number] effective date: 12/19/22 - 01/17/23    FYI: Per CMS Guidelines: One to two levels, either unilateral or bilateral, are allowed per session per spine region. The need for a three or four-level procedure bilaterally may be considered under unique circumstances and with sufficient documentation of medical necessity on appeal. A session is a time period, which includes all procedures (i.e., medial branch block (MBB), intraarticular injections (IA), facet cyst ruptures, and RFA ablations that are performed during the same day.
Pt scheduled for procedure on 12/27/22    2 wk follow-up appointment scheduled on 01/11/23 at 8 am.    Surgery Scheduling Form faxed to [address] [place] [address] at [phone].
Clear

## 2022-12-27 ENCOUNTER — TELEPHONE (OUTPATIENT)
Dept: PAIN CLINIC | Facility: HOSPITAL | Age: 47
End: 2022-12-27

## 2022-12-27 RX ORDER — LEVOTHYROXINE SODIUM 0.03 MG/1
TABLET ORAL
Qty: 38 TABLET | Refills: 0 | Status: SHIPPED | OUTPATIENT
Start: 2022-12-27

## 2022-12-27 NOTE — TELEPHONE ENCOUNTER
Per [address] - patient called this morning 12/27/22 and LVM to cancel injection due to testing positive for COVID. LVM informing patient that I received information from surgery center about cancellation and to call back after 14-days to reschedule if she would like to proceed - effective period for patient to complete injection is 12/19/22 - 01/17/23. Revised cxl sx form faxed to [address] at [phone] and removed from surgery schedule.

## 2023-01-02 DIAGNOSIS — R12 HEART BURN: ICD-10-CM

## 2023-01-03 RX ORDER — PANTOPRAZOLE SODIUM 20 MG/1
TABLET, DELAYED RELEASE ORAL
Qty: 30 TABLET | Refills: 0 | Status: SHIPPED | OUTPATIENT
Start: 2023-01-03

## 2023-01-17 NOTE — TELEPHONE ENCOUNTER
Disp Refills Start End    CYCLOBENZAPRINE 5 MG Oral Tab 30 tablet 0 12/16/2022       LOV: 8/3/22  Future Appointments   Date Time Provider Kirsten Hendrickson   2/20/2023  9:00 AM Juli Robertson MD [address] [place] [address] PAIN EM [facility]     Instructions    You were seen for the myofascial pain syndrome and undifferentiated connective tissue disease  Continue Plaquenil  Try Flexeril at night to see if it will help, it is a muscle relaxer so can make you sleepy  Try the triamcinolone oral paste for your sores  Blood work today  Make sure to see the eye doctor  See me in 6 months

## 2023-01-18 RX ORDER — CYCLOBENZAPRINE HCL 5 MG
TABLET ORAL
Qty: 30 TABLET | Refills: 0 | Status: SHIPPED | OUTPATIENT
Start: 2023-01-18

## 2023-02-06 RX ORDER — LEVOTHYROXINE SODIUM 0.03 MG/1
TABLET ORAL
Qty: 38 TABLET | Refills: 0 | Status: SHIPPED | OUTPATIENT
Start: 2023-02-06

## 2023-02-13 RX ORDER — HYDROXYCHLOROQUINE SULFATE 200 MG/1
TABLET, FILM COATED ORAL
Qty: 180 TABLET | Refills: 1 | Status: SHIPPED | OUTPATIENT
Start: 2023-02-13

## 2023-02-13 RX ORDER — CYCLOBENZAPRINE HCL 5 MG
TABLET ORAL
Qty: 30 TABLET | Refills: 0 | Status: SHIPPED | OUTPATIENT
Start: 2023-02-13

## 2023-02-21 ENCOUNTER — OFFICE VISIT (OUTPATIENT)
Dept: PAIN CLINIC | Facility: HOSPITAL | Age: 48
End: 2023-02-21
Attending: NURSE PRACTITIONER
Payer: COMMERCIAL

## 2023-02-21 VITALS — SYSTOLIC BLOOD PRESSURE: 134 MMHG | OXYGEN SATURATION: 97 % | DIASTOLIC BLOOD PRESSURE: 83 MMHG | HEART RATE: 116 BPM

## 2023-02-21 DIAGNOSIS — M96.1 FAILED BACK SURGICAL SYNDROME: ICD-10-CM

## 2023-02-21 DIAGNOSIS — G89.29 CHRONIC NECK PAIN: ICD-10-CM

## 2023-02-21 DIAGNOSIS — M54.2 CHRONIC NECK PAIN: ICD-10-CM

## 2023-02-21 DIAGNOSIS — M54.16 LUMBAR RADICULOPATHY, CHRONIC: ICD-10-CM

## 2023-02-21 DIAGNOSIS — M79.7 FIBROMYALGIA: ICD-10-CM

## 2023-02-21 DIAGNOSIS — Z51.81 ENCOUNTER FOR MONITORING OPIOID MAINTENANCE THERAPY: Primary | ICD-10-CM

## 2023-02-21 DIAGNOSIS — Z79.891 ENCOUNTER FOR MONITORING OPIOID MAINTENANCE THERAPY: Primary | ICD-10-CM

## 2023-02-21 PROCEDURE — 80307 DRUG TEST PRSMV CHEM ANLYZR: CPT | Performed by: NURSE PRACTITIONER

## 2023-02-21 PROCEDURE — 99211 OFF/OP EST MAY X REQ PHY/QHP: CPT

## 2023-02-21 RX ORDER — HYDROCODONE BITARTRATE AND ACETAMINOPHEN 7.5; 325 MG/1; MG/1
1 TABLET ORAL EVERY 12 HOURS PRN
Qty: 60 TABLET | Refills: 0 | Status: SHIPPED | OUTPATIENT
Start: 2023-02-22 | End: 2023-03-24

## 2023-02-21 RX ORDER — HYDROCODONE BITARTRATE AND ACETAMINOPHEN 7.5; 325 MG/1; MG/1
1 TABLET ORAL EVERY 12 HOURS PRN
Qty: 60 TABLET | Refills: 0 | Status: SHIPPED | OUTPATIENT
Start: 2023-03-24 | End: 2023-04-23

## 2023-02-21 NOTE — PROGRESS NOTES
PT presents ambulatory to the [facility]. NA & UDS renewed. PT states 5/10 pain today. ZOHRA Barrera saw PT for med eval.  See notes for POC.

## 2023-03-17 RX ORDER — CYCLOBENZAPRINE HCL 5 MG
5 TABLET ORAL NIGHTLY
Qty: 30 TABLET | Refills: 0 | Status: SHIPPED | OUTPATIENT
Start: 2023-03-17

## 2023-03-17 NOTE — TELEPHONE ENCOUNTER
LOV: 8/3/2022  Future Appointments   Date Time Provider Kirsten Hendrickson   4/17/2023  2:00 PM ANOOP Martinez [facility] PAIN [facility]     Last refilled 2/13/2023 #30 with no refills.

## 2023-04-25 RX ORDER — CYCLOBENZAPRINE HCL 5 MG
5 TABLET ORAL NIGHTLY
Qty: 30 TABLET | Refills: 0 | Status: SHIPPED | OUTPATIENT
Start: 2023-04-25

## 2023-04-25 NOTE — TELEPHONE ENCOUNTER
Medication Quantity Refills Start End   cyclobenzaprine 5 MG Oral Tab 30 tablet 0 3/17/2023      LOV: 8/3/22  No future appointments.   Labs: 7/21/21  Instructions    You were seen for the myofascial pain syndrome and undifferentiated connective tissue disease  Continue Plaquenil  Try Flexeril at night to see if it will help, it is a muscle relaxer so can make you sleepy  Try the triamcinolone oral paste for your sores  Blood work today  Make sure to see the eye doctor  See me in 6 months

## 2023-06-12 RX ORDER — CYCLOBENZAPRINE HCL 5 MG
5 TABLET ORAL NIGHTLY
Qty: 30 TABLET | Refills: 0 | Status: SHIPPED | OUTPATIENT
Start: 2023-06-12

## 2023-06-12 NOTE — TELEPHONE ENCOUNTER
Rx request for Cyclobenzaprine 5mg, please review and sign off if appropriate. Thank you. Last seen:8/3/22 no future appts scheduled. Last refill: 4/25/23 # 30 with 0 refills.

## 2023-07-18 NOTE — TELEPHONE ENCOUNTER
Current Outpatient Medications   Medication Sig Dispense Refill    cyclobenzaprine 5 MG Oral Tab Take 1 tablet (5 mg total) by mouth nightly.  30 tablet 0

## 2023-07-18 NOTE — TELEPHONE ENCOUNTER
LOV: 8/3/2022  Future Appointments   Date Time Provider Kirsten Hendrickson   11/30/2023  5:45 PM Jacquelyn Alvarado MD [facility]     Please advise.

## 2023-07-19 RX ORDER — CYCLOBENZAPRINE HCL 5 MG
5 TABLET ORAL NIGHTLY
Qty: 30 TABLET | Refills: 0 | Status: SHIPPED | OUTPATIENT
Start: 2023-07-19

## 2023-09-05 ENCOUNTER — OFFICE VISIT (OUTPATIENT)
Dept: INTERNAL MEDICINE CLINIC | Facility: CLINIC | Age: 48
End: 2023-09-05

## 2023-09-05 VITALS
SYSTOLIC BLOOD PRESSURE: 142 MMHG | HEIGHT: 62 IN | WEIGHT: 172 LBS | BODY MASS INDEX: 31.65 KG/M2 | DIASTOLIC BLOOD PRESSURE: 87 MMHG | HEART RATE: 109 BPM

## 2023-09-05 DIAGNOSIS — D48.9 NEOPLASM OF UNCERTAIN BEHAVIOR: ICD-10-CM

## 2023-09-05 DIAGNOSIS — N95.0 POST-MENOPAUSAL BLEEDING: Primary | ICD-10-CM

## 2023-09-05 DIAGNOSIS — Z80.0 FAMILY HISTORY OF COLON CANCER: ICD-10-CM

## 2023-09-05 DIAGNOSIS — Z12.31 ENCOUNTER FOR SCREENING MAMMOGRAM FOR MALIGNANT NEOPLASM OF BREAST: ICD-10-CM

## 2023-09-05 DIAGNOSIS — Z12.4 ENCOUNTER FOR SCREENING FOR MALIGNANT NEOPLASM OF CERVIX: ICD-10-CM

## 2023-09-05 DIAGNOSIS — N89.8 VAGINAL DISCHARGE: ICD-10-CM

## 2023-09-05 PROCEDURE — 3077F SYST BP >= 140 MM HG: CPT | Performed by: NURSE PRACTITIONER

## 2023-09-05 PROCEDURE — 3079F DIAST BP 80-89 MM HG: CPT | Performed by: NURSE PRACTITIONER

## 2023-09-05 PROCEDURE — 3008F BODY MASS INDEX DOCD: CPT | Performed by: NURSE PRACTITIONER

## 2023-09-05 PROCEDURE — 99204 OFFICE O/P NEW MOD 45 MIN: CPT | Performed by: NURSE PRACTITIONER

## 2023-09-05 RX ORDER — KETAMINE HCL 100 %
POWDER (GRAM) MISCELLANEOUS
COMMUNITY
Start: 2023-08-07

## 2023-09-05 RX ORDER — DULOXETIN HYDROCHLORIDE 20 MG/1
20 CAPSULE, DELAYED RELEASE ORAL DAILY
COMMUNITY
Start: 2023-08-08

## 2023-09-05 RX ORDER — TOPIRAMATE 100 MG/1
TABLET, FILM COATED ORAL
COMMUNITY
Start: 2023-07-05

## 2023-09-07 ENCOUNTER — OFFICE VISIT (OUTPATIENT)
Dept: OBGYN CLINIC | Facility: CLINIC | Age: 48
End: 2023-09-07
Payer: COMMERCIAL

## 2023-09-07 VITALS
HEIGHT: 62 IN | SYSTOLIC BLOOD PRESSURE: 120 MMHG | WEIGHT: 173.69 LBS | BODY MASS INDEX: 31.96 KG/M2 | DIASTOLIC BLOOD PRESSURE: 80 MMHG

## 2023-09-07 DIAGNOSIS — N95.0 POSTMENOPAUSAL BLEEDING: Primary | ICD-10-CM

## 2023-09-07 PROCEDURE — 3008F BODY MASS INDEX DOCD: CPT | Performed by: OBSTETRICS & GYNECOLOGY

## 2023-09-07 PROCEDURE — 88305 TISSUE EXAM BY PATHOLOGIST: CPT | Performed by: OBSTETRICS & GYNECOLOGY

## 2023-09-07 PROCEDURE — 58100 BIOPSY OF UTERUS LINING: CPT | Performed by: OBSTETRICS & GYNECOLOGY

## 2023-09-07 PROCEDURE — 3074F SYST BP LT 130 MM HG: CPT | Performed by: OBSTETRICS & GYNECOLOGY

## 2023-09-07 PROCEDURE — 3079F DIAST BP 80-89 MM HG: CPT | Performed by: OBSTETRICS & GYNECOLOGY

## 2023-09-07 RX ORDER — KETAMINE HYDROCHLORIDE 100 MG/ML
INJECTION INTRAMUSCULAR; INTRAVENOUS
COMMUNITY
Start: 2023-09-06

## 2023-09-07 NOTE — PATIENT INSTRUCTIONS
[facility] Department of OB/GYN  After Care Instructions for Endometrial Biopsy      Biopsy Results   You will receive a phone call with your biopsy results in 7 business days. If you have not received your results in 10 days, please contact our office. The results of your biopsy will determine if further treatment will be necessary. Bleeding   You may have some light bleeding or blackish clumpy discharge for several days after your biopsy. Restrictions    You should avoid intercourse or tampon use for 1 day after your biopsy. Pain    You may experience mild menstrual cramping after your biopsy. You may use Ibuprofen, Aleve or Tylenol to relieve your discomfort. If you experience severe or persistent pain contact our office. If you have any additional questions, please call us at [phone].

## 2023-09-07 NOTE — PROGRESS NOTES
1 week ago, saw some red when wipting  The next morning, had bled onto bed sheets    Yesterday afternoon had really slowed down and today has been baby spotting. Mild cramps during the heavy bleeding. Had a mucous-type discharge 1-2 weeks before. Then the night before, woke up in the middle of the night with a horrible tummy ache / pain in lower abdomen, felt like she needed BM, didn't poop and eventually it went away. It felt like period    Hasn't had a period in about 5 years - was seeing endocrinologist for the first time in 2018, so is sure it was that year. ENDOMETRIAL BIOPSY PROCEDURE NOTE  [facility]      DIAGNOSIS:  postmenopausal bleeding  Patient is a 53year old  who presents for an endometrial biopsy. The patient was informed regarding indication for procedure, technique, and the risk of procedure to include bleeding, infection and perforation. INFORMED CONSENT & TIME OUT:   Informed consent obtained following review of above risks, benefits and alternatives. Time out performed just prior to procedure and documented in visit navigator. PHYSICAL EXAM:  Cervix: no lesions    Uterus: sounds to 9 cm    Adenexa: no masses/nontender    PROCEDURE:  The area was prepped in sterile fashion with Betadine. The endometrial pipelle was introduced 9 cm into the cervical os without difficulty. Tissue was obtained. The pipelle was removed. Good Hemostasis was noted at the cervical os. Patient tolerated the procedure well with no immediate complications. The tissue was sent to pathology. INSTRUCTIONS GIVEN TO THE PATIENT: Patient was told to return to the office for fever, increased bleeding, abdominal pain, malodorous discharge    DISPOSITION:  Follow-up in 1-2 weeks. Has US scheduled next week.     Suspect had one egg left in ovary, was released (when she had mucou-like discharge) and this was a period, but EMB and US will eval uterine structure and r/o malignancy.     Burt Lake Kameron, DO

## 2023-09-08 LAB
GENITAL VAGINOSIS SCREEN: NEGATIVE
TRICHOMONAS SCREEN: NEGATIVE

## 2023-09-14 ENCOUNTER — HOSPITAL ENCOUNTER (OUTPATIENT)
Dept: ULTRASOUND IMAGING | Age: 48
Discharge: HOME | End: 2023-09-14
Attending: NURSE PRACTITIONER
Payer: COMMERCIAL

## 2023-09-14 DIAGNOSIS — N95.0 POST-MENOPAUSAL BLEEDING: ICD-10-CM

## 2023-09-14 PROCEDURE — 76830 TRANSVAGINAL US NON-OB: CPT | Performed by: NURSE PRACTITIONER

## 2023-09-14 PROCEDURE — 76856 US EXAM PELVIC COMPLETE: CPT | Performed by: NURSE PRACTITIONER

## 2023-09-22 ENCOUNTER — OFFICE VISIT (OUTPATIENT)
Dept: OBGYN CLINIC | Facility: CLINIC | Age: 48
End: 2023-09-22
Payer: COMMERCIAL

## 2023-09-22 ENCOUNTER — TELEPHONE (OUTPATIENT)
Dept: OBGYN CLINIC | Facility: CLINIC | Age: 48
End: 2023-09-22

## 2023-09-22 VITALS
DIASTOLIC BLOOD PRESSURE: 68 MMHG | BODY MASS INDEX: 32.02 KG/M2 | WEIGHT: 174 LBS | SYSTOLIC BLOOD PRESSURE: 124 MMHG | HEIGHT: 62 IN

## 2023-09-22 DIAGNOSIS — R93.89 THICKENED ENDOMETRIUM: ICD-10-CM

## 2023-09-22 DIAGNOSIS — N95.0 POSTMENOPAUSAL BLEEDING: Primary | ICD-10-CM

## 2023-09-22 DIAGNOSIS — N95.0 PMB (POSTMENOPAUSAL BLEEDING): Primary | ICD-10-CM

## 2023-09-22 PROCEDURE — 3078F DIAST BP <80 MM HG: CPT | Performed by: OBSTETRICS & GYNECOLOGY

## 2023-09-22 PROCEDURE — 99213 OFFICE O/P EST LOW 20 MIN: CPT | Performed by: OBSTETRICS & GYNECOLOGY

## 2023-09-22 PROCEDURE — 3008F BODY MASS INDEX DOCD: CPT | Performed by: OBSTETRICS & GYNECOLOGY

## 2023-09-22 PROCEDURE — 3074F SYST BP LT 130 MM HG: CPT | Performed by: OBSTETRICS & GYNECOLOGY

## 2023-09-22 NOTE — TELEPHONE ENCOUNTER
Scheduled 10/10 1330      ----- Message from Bailey North DO sent at 9/22/2023  9:36 AM CDT -----  Regarding: please sched surgery  Please schedule the following surgery:    Procedure: hysteroscopy D&C  Assist: na  Date: 10/10/23                               Time Requested: to follow my other cases  Dx: postmenopausal bleeding, thickened endometrium  Pre-op appt: na  Admission type: outpatient  Department of discharge(SDS/Floor): sds  Expected length of stay: na  Procedure length time (please enter amount you are requesting): 30 min  Recovery time (patients always ask): 1 day  Medical Clearance: (Y/N) no  Post- Op f/u appt time frame: 2 weeks post-op

## 2023-10-10 ENCOUNTER — ANESTHESIA EVENT (OUTPATIENT)
Dept: SURGERY | Facility: HOSPITAL | Age: 48
End: 2023-10-10
Payer: COMMERCIAL

## 2023-10-10 ENCOUNTER — HOSPITAL ENCOUNTER (OUTPATIENT)
Facility: HOSPITAL | Age: 48
Setting detail: HOSPITAL OUTPATIENT SURGERY
Discharge: HOME | End: 2023-10-10
Attending: OBSTETRICS & GYNECOLOGY | Admitting: OBSTETRICS & GYNECOLOGY
Payer: COMMERCIAL

## 2023-10-10 ENCOUNTER — ANESTHESIA (OUTPATIENT)
Dept: SURGERY | Facility: HOSPITAL | Age: 48
End: 2023-10-10
Payer: COMMERCIAL

## 2023-10-10 VITALS
DIASTOLIC BLOOD PRESSURE: 67 MMHG | BODY MASS INDEX: 31.28 KG/M2 | WEIGHT: 170 LBS | HEIGHT: 62 IN | SYSTOLIC BLOOD PRESSURE: 133 MMHG | HEART RATE: 86 BPM | OXYGEN SATURATION: 99 % | TEMPERATURE: 98 F | RESPIRATION RATE: 16 BRPM

## 2023-10-10 DIAGNOSIS — R93.89 THICKENED ENDOMETRIUM: ICD-10-CM

## 2023-10-10 DIAGNOSIS — N95.0 PMB (POSTMENOPAUSAL BLEEDING): ICD-10-CM

## 2023-10-10 PROBLEM — L93.0 LUPUS ERYTHEMATOSUS: Status: ACTIVE | Noted: 2023-03-27

## 2023-10-10 PROBLEM — N94.10 PAIN IN FEMALE GENITALIA ON INTERCOURSE: Status: RESOLVED | Noted: 2022-10-19 | Resolved: 2023-10-10

## 2023-10-10 PROBLEM — N84.1 CERVICAL POLYP: Status: RESOLVED | Noted: 2018-10-20 | Resolved: 2023-10-10

## 2023-10-10 PROBLEM — F52.9 SEXUAL AROUSAL DISORDER: Status: RESOLVED | Noted: 2022-10-19 | Resolved: 2023-10-10

## 2023-10-10 LAB — B-HCG UR QL: NEGATIVE

## 2023-10-10 PROCEDURE — 0U5B8ZZ DESTRUCTION OF ENDOMETRIUM, VIA NATURAL OR ARTIFICIAL OPENING ENDOSCOPIC: ICD-10-PCS | Performed by: OBSTETRICS & GYNECOLOGY

## 2023-10-10 PROCEDURE — 58558 HYSTEROSCOPY BIOPSY: CPT | Performed by: OBSTETRICS & GYNECOLOGY

## 2023-10-10 RX ORDER — LIDOCAINE HYDROCHLORIDE 10 MG/ML
INJECTION, SOLUTION EPIDURAL; INFILTRATION; INTRACAUDAL; PERINEURAL AS NEEDED
Status: DISCONTINUED | OUTPATIENT
Start: 2023-10-10 | End: 2023-10-10 | Stop reason: HOSPADM

## 2023-10-10 RX ORDER — MORPHINE SULFATE 10 MG/ML
6 INJECTION, SOLUTION INTRAMUSCULAR; INTRAVENOUS EVERY 10 MIN PRN
Status: DISCONTINUED | OUTPATIENT
Start: 2023-10-10 | End: 2023-10-10

## 2023-10-10 RX ORDER — MORPHINE SULFATE 4 MG/ML
4 INJECTION, SOLUTION INTRAMUSCULAR; INTRAVENOUS EVERY 10 MIN PRN
Status: DISCONTINUED | OUTPATIENT
Start: 2023-10-10 | End: 2023-10-10

## 2023-10-10 RX ORDER — FAMOTIDINE 20 MG/1
20 TABLET, FILM COATED ORAL ONCE
Status: COMPLETED | OUTPATIENT
Start: 2023-10-10 | End: 2023-10-10

## 2023-10-10 RX ORDER — MORPHINE SULFATE 2 MG/ML
2 INJECTION, SOLUTION INTRAMUSCULAR; INTRAVENOUS EVERY 10 MIN PRN
Status: DISCONTINUED | OUTPATIENT
Start: 2023-10-10 | End: 2023-10-10

## 2023-10-10 RX ORDER — MIDAZOLAM HYDROCHLORIDE 1 MG/ML
INJECTION INTRAMUSCULAR; INTRAVENOUS AS NEEDED
Status: DISCONTINUED | OUTPATIENT
Start: 2023-10-10 | End: 2023-10-10 | Stop reason: SURG

## 2023-10-10 RX ORDER — DEXAMETHASONE SODIUM PHOSPHATE 4 MG/ML
VIAL (ML) INJECTION AS NEEDED
Status: DISCONTINUED | OUTPATIENT
Start: 2023-10-10 | End: 2023-10-10 | Stop reason: SURG

## 2023-10-10 RX ORDER — ONDANSETRON 2 MG/ML
4 INJECTION INTRAMUSCULAR; INTRAVENOUS EVERY 6 HOURS PRN
Status: DISCONTINUED | OUTPATIENT
Start: 2023-10-10 | End: 2023-10-10

## 2023-10-10 RX ORDER — HYDROMORPHONE HYDROCHLORIDE 1 MG/ML
0.6 INJECTION, SOLUTION INTRAMUSCULAR; INTRAVENOUS; SUBCUTANEOUS EVERY 5 MIN PRN
Status: DISCONTINUED | OUTPATIENT
Start: 2023-10-10 | End: 2023-10-10

## 2023-10-10 RX ORDER — HYDROMORPHONE HYDROCHLORIDE 1 MG/ML
0.2 INJECTION, SOLUTION INTRAMUSCULAR; INTRAVENOUS; SUBCUTANEOUS EVERY 5 MIN PRN
Status: DISCONTINUED | OUTPATIENT
Start: 2023-10-10 | End: 2023-10-10

## 2023-10-10 RX ORDER — KETOROLAC TROMETHAMINE 30 MG/ML
INJECTION, SOLUTION INTRAMUSCULAR; INTRAVENOUS AS NEEDED
Status: DISCONTINUED | OUTPATIENT
Start: 2023-10-10 | End: 2023-10-10 | Stop reason: SURG

## 2023-10-10 RX ORDER — LIDOCAINE HYDROCHLORIDE 10 MG/ML
INJECTION, SOLUTION EPIDURAL; INFILTRATION; INTRACAUDAL; PERINEURAL AS NEEDED
Status: DISCONTINUED | OUTPATIENT
Start: 2023-10-10 | End: 2023-10-10 | Stop reason: SURG

## 2023-10-10 RX ORDER — ONDANSETRON 4 MG/1
4 TABLET, FILM COATED ORAL EVERY 8 HOURS PRN
Status: DISCONTINUED | OUTPATIENT
Start: 2023-10-10 | End: 2023-10-10

## 2023-10-10 RX ORDER — HYDROMORPHONE HYDROCHLORIDE 1 MG/ML
0.4 INJECTION, SOLUTION INTRAMUSCULAR; INTRAVENOUS; SUBCUTANEOUS EVERY 5 MIN PRN
Status: DISCONTINUED | OUTPATIENT
Start: 2023-10-10 | End: 2023-10-10

## 2023-10-10 RX ORDER — ONDANSETRON 2 MG/ML
INJECTION INTRAMUSCULAR; INTRAVENOUS AS NEEDED
Status: DISCONTINUED | OUTPATIENT
Start: 2023-10-10 | End: 2023-10-10 | Stop reason: SURG

## 2023-10-10 RX ORDER — NALOXONE HYDROCHLORIDE 0.4 MG/ML
80 INJECTION, SOLUTION INTRAMUSCULAR; INTRAVENOUS; SUBCUTANEOUS AS NEEDED
Status: DISCONTINUED | OUTPATIENT
Start: 2023-10-10 | End: 2023-10-10

## 2023-10-10 RX ORDER — ACETAMINOPHEN 500 MG
1000 TABLET ORAL ONCE
Status: COMPLETED | OUTPATIENT
Start: 2023-10-10 | End: 2023-10-10

## 2023-10-10 RX ORDER — PROCHLORPERAZINE EDISYLATE 5 MG/ML
5 INJECTION INTRAMUSCULAR; INTRAVENOUS EVERY 8 HOURS PRN
Status: DISCONTINUED | OUTPATIENT
Start: 2023-10-10 | End: 2023-10-10

## 2023-10-10 RX ORDER — SODIUM CHLORIDE, SODIUM LACTATE, POTASSIUM CHLORIDE, CALCIUM CHLORIDE 600; 310; 30; 20 MG/100ML; MG/100ML; MG/100ML; MG/100ML
INJECTION, SOLUTION INTRAVENOUS CONTINUOUS
Status: DISCONTINUED | OUTPATIENT
Start: 2023-10-10 | End: 2023-10-10

## 2023-10-10 RX ORDER — ONDANSETRON 2 MG/ML
4 INJECTION INTRAMUSCULAR; INTRAVENOUS EVERY 8 HOURS PRN
Status: DISCONTINUED | OUTPATIENT
Start: 2023-10-10 | End: 2023-10-10

## 2023-10-10 RX ADMIN — SODIUM CHLORIDE, SODIUM LACTATE, POTASSIUM CHLORIDE, CALCIUM CHLORIDE: 600; 310; 30; 20 INJECTION, SOLUTION INTRAVENOUS at 09:11:00

## 2023-10-10 RX ADMIN — LIDOCAINE HYDROCHLORIDE 50 MG: 10 INJECTION, SOLUTION EPIDURAL; INFILTRATION; INTRACAUDAL; PERINEURAL at 09:14:00

## 2023-10-10 RX ADMIN — DEXAMETHASONE SODIUM PHOSPHATE 4 MG: 4 MG/ML VIAL (ML) INJECTION at 09:21:00

## 2023-10-10 RX ADMIN — SODIUM CHLORIDE, SODIUM LACTATE, POTASSIUM CHLORIDE, CALCIUM CHLORIDE: 600; 310; 30; 20 INJECTION, SOLUTION INTRAVENOUS at 09:38:00

## 2023-10-10 RX ADMIN — ONDANSETRON 4 MG: 2 INJECTION INTRAMUSCULAR; INTRAVENOUS at 09:21:00

## 2023-10-10 RX ADMIN — KETOROLAC TROMETHAMINE 30 MG: 30 INJECTION, SOLUTION INTRAMUSCULAR; INTRAVENOUS at 09:38:00

## 2023-10-10 RX ADMIN — MIDAZOLAM HYDROCHLORIDE 2 MG: 1 INJECTION INTRAMUSCULAR; INTRAVENOUS at 09:14:00

## 2023-10-10 NOTE — OPERATIVE REPORT
OPERATIVE REPORT  [facility]    Pre-operative Diagnosis:  postmenopausal bleeding, thick endometrium  Post-operative Diagnosis:  same  Procedure:  hysteroscopy with endometrial resection  Surgeon:  Jordy Washington DO   Anesthesia:  MAC  Hysteroscopic Fluid Deficit: 150 ml  Urine Output:  75 ml  Estimated blood loss:  15 mL  Specimens:  endometrium  Complications: none  Condition:  stable  Findings:  Exam under anesthesia revealed, normal sized, anteverted uterus. Cervix appeared closed. The cervix was hydro-dilated using the truclear diagnostic scope. Hysterscopy findings: Bilateral ostia visualized, near the right tubal os, irregular appearing endometrium. Excellent hemostasis achieved. Technique: The patient was taken to the operating room after informed consent was obtained with IV running. Once anesthesia was initiated and found to be adequate the patient was placed in the dorsal lithotomy position in [address] [place] [address]. She underwent an exam under anesthesia with the findings noted above. The patient was prepped and draped in the normal sterile fashion for a vaginal procedure. Timeout was performed. The bladder was catheterized. A sterile speculum was placed in the patient's vagina with visualization of the entire cervix. The anterior lip of the cervix was grasped with a single-toothed tenaculum. The cervix was then hydro-dilated using the truclear diagnostic hysteroscope. After the above noted findings, the hysteroscope was switched to the truclear operative scope. The Truclear soft tissue mini device was used to perform a general endometrial resection, with noted removal of the irregular tissue near the right tubal os. . The hysteroscope was then withdrawn. The tissue was sent for pathology. All the instruments were removed from the patient's vagina including the tenaculum with excellent hemostasis noted at the tenaculum sites.  The patient was awakened, extubated, and taken to the recovery room in stable condition. She tolerated the procedure well.   David Jacobson, DO

## 2023-10-10 NOTE — H&P
GYN Pre-op History and Physical  [facility]    CHIEF COMPLAINT: Scheduled surgery   HISTORY OF PRESENT ILLNESS:   Temo Dominguez is a 53year old female   who presents for scheduled hysteroscopy D&C.    no complaints today. PAST MEDICAL HISTORY:   Past Medical History:   Diagnosis Date    Anxiety state     Attention deficit disorder     Back problem     Bipolar disorder (HCC)     Depression     Disorder of thyroid     Hashimoto's    Dyslipidemia     Esophageal reflux     Fibromyalgia     DX     Hashimoto's disease     Migraines     Neck pain     Obesity (BMI 30-39. 9)     PMB (postmenopausal bleeding)     Postmenopausal 2018    Undifferentiated connective tissue disease ([address]     Visual impairment         PAST SURGICAL HISTORY:   Past Surgical History:   Procedure Laterality Date    Back surgery  12/29/15    L5-S1 ALIF     Cholecystectomy      Fluor gid & loclzj ndl/cath spi dx/ther njx N/A 2015    Procedure: LUMBAR / TRANSFORAMINAL EPIDURAL STEROID INJECTION;  Surgeon: Toby Pedraza DO;  Location: [address] ndl/cath spi dx/ther njx N/A 2015    Procedure: LUMBAR / TRANSFORAMINAL EPIDURAL STEROID INJECTION;  Surgeon: Toby Pedraza DO;  Location: [address] ndl/cath spi dx/ther njx N/A 2015    Procedure: LUMBAR / TRANSFORAMINAL EPIDURAL STEROID INJECTION;  Surgeon: Toby Pedraza DO;  Location: [address] [place] [address]    Injection, w/wo contrast, dx/therapeutic substance, epidural/subarachnoid; lumbar/sacral N/A 2015    Procedure: LUMBAR / TRANSFORAMINAL EPIDURAL STEROID INJECTION;  Surgeon: Toby Pedraza DO;  Location: [address] [place] [address]    Injection, w/wo contrast, dx/therapeutic substance, epidural/subarachnoid; lumbar/sacral N/A 2015    Procedure: LUMBAR / TRANSFORAMINAL EPIDURAL STEROID INJECTION;  Surgeon: Toby Pedraza DO;  Location: [address] [place] [address] Injection, w/wo contrast, dx/therapeutic substance, epidural/subarachnoid; lumbar/sacral N/A 2015    Procedure: LUMBAR / TRANSFORAMINAL EPIDURAL STEROID INJECTION;  Surgeon: Renard Dos Santos DO;  Location: [address] [place] [address]    Removal gallbladder          PAST OB HISTORY:  OB History    Para Term  AB Living   2 2 2     2   SAB IAB Ectopic Multiple Live Births           2      # Outcome Date GA Lbr Jeremiah/2nd Weight Sex Delivery Anes PTL Lv   2 Term      NORMAL SPONT   JOSE   1 Term      NORMAL SPONT   JOSE       CURRENT MEDICATIONS:      Current Outpatient Medications:     cholecalciferol (VITAMIN D3) 125 MCG (5000 UT) Oral Cap, Take 1 capsule (5,000 Units total) by mouth daily. , Disp: , Rfl:     ketamine 100 MG/ML Injection Solution, in the morning, at noon, and at bedtime. KETAMINE 100 MG/ML PF NASAL SPRAY 2 sprays TID Comes from a compounding pharmacy, Disp: , Rfl:     topiramate 100 MG Oral Tab, Take 1 tablet (100 mg total) by mouth 2 (two) times daily. 100 mg AM , 200 mg HS, Disp: , Rfl:     DULoxetine 20 MG Oral Cap DR Particles, Take 1 capsule (20 mg total) by mouth daily. , Disp: , Rfl:     cyclobenzaprine 5 MG Oral Tab, Take 1 tablet (5 mg total) by mouth nightly. (Patient taking differently: Take 1 tablet (5 mg total) by mouth 3 (three) times daily as needed for Muscle spasms.), Disp: 30 tablet, Rfl: 0    busPIRone HCl 30 MG Oral Tab, Take 1 tablet (30 mg total) by mouth 2 (two) times daily. , Disp: , Rfl:     cloNIDine 0.1 MG Oral Tab, Take 1 tablet (0.1 mg total) by mouth every morning., Disp: , Rfl:     lamoTRIgine  MG Oral Tablet 24 Hr, Take 1 tablet (300 mg total) by mouth every morning., Disp: , Rfl:     QUEtiapine 200 MG Oral Tab, Take 1 tablet (200 mg total) by mouth nightly., Disp: , Rfl:     Atomoxetine HCl 80 MG Oral Cap, Take 60 mg by mouth daily. , Disp: , Rfl:     HYDROXYCHLOROQUINE 200 MG Oral Tab, TAKE ONE TABLET BY MOUTH TWICE DAILY, Disp: 180 tablet, Rfl: 1 LEVOTHYROXINE 25 MCG Oral Tab, TAKE 1 TABLET MONDAY-FRIDAY AND TAKE 2 TABLETS SATURDAY AND SUNDAY, Disp: 38 tablet, Rfl: 0    PANTOPRAZOLE 20 MG Oral Tab EC, Take 1 tablet by mouth every morning before breakfast. (Patient not taking: Reported on 9/22/2023), Disp: 30 tablet, Rfl: 0    triamcinolone acetonide 0.1 % Mouth/Throat Paste, Apply to mouth sores 2-3 times a day as needed (Patient not taking: Reported on 9/22/2023), Disp: 1 each, Rfl: 0    ALLERGIES:    Adhesive Tape           RASH    SOCIAL HISTORY:  Social History    Socioeconomic History      Marital status:     Tobacco Use      Smoking status: Former        Packs/day: 0.50        Years: 25.00        Additional pack years: 0.00        Total pack years: 12.50        Types: Cigarettes      Smokeless tobacco: Never      Tobacco comments: since 17y/o age; quit with pregnancies    Substance and Sexual Activity      Alcohol use: Not Currently        Comment: very rare, once per year      Drug use: Not Currently        Types: Cannabis        Comment: cbd gummies      Sexual activity: Yes        Partners: Male        FAMILY HISTORY:  Family History   Problem Relation Age of Onset    Heart Disorder Father     Other (COPD) Father     Cancer Mother         Kidney and Bladder cancer    Thyroid disease Mother         Goiter sugery    Heart Disorder Mother     Heart Disease Mother     Other (kidney cancer) Mother     Other (bladder cancer) Mother     Skin cancer Mother     Thyroid disease Maternal Grandmother     Colon Cancer Maternal Grandmother     Diabetes Maternal Grandmother     Cancer Maternal Aunt         lung cancer    Ovarian Cancer Half-Sister     Breast Cancer Maternal Cousin     Uterine Cancer Maternal Cousin     Thyroid disease Other         Unlce and Aunt     ASSESSMENTS:  PHYSICAL EXAM:   No LMP recorded (approximate). (Menstrual status: Menopause).     10/05/23  1147   Weight: 171 lb (77.6 kg)   Height: 62\"     CONSTITUTIONAL: Awake, alert, cooperative, no apparent distress, and appears stated age   NECK: Supple, symmetrical, trachea midline, no adenopathy, thyroid symmetric, not enlarged and no tenderness  LUNGS: No excess work of breathing, LCTAB  CV: RRR no murmurs  ABDOMEN: Soft, non-distended, non-tender, no masses palpated    GENITAL/URINARY:    Uterus nontender. MUSCULOSKELETAL: There is no redness, warmth, or swelling of the joints. Tone is normal.  NEUROLOGIC: Patient is awake, alert and oriented to name, place and time. Casual gait is normal.  SKIN: no bruising or bleeding and no rashes  PSYCHIATRIC: Behavior:  Appropriate  Mood:  appropriate    DATA: pelvic US 23  FINDINGS:                UTERUS:  7.51 cm x 3.30 cm x 4.89 cm    Endometrium Thickness: 1.35 cm    The endometrial stripe is prominent in size and slightly heterogeneous , particularly for a postmenopausal female. RIGHT OVARY:  2.37 cm x 1.32 cm x 2.17 cm    The right ovary appears normal in size, shape, and echogenicity. No significant masses are identified. LEFT OVARY:  2.16 cm x 1.20 cm x 2.19 cm    The left ovary appears normal in size, shape, and echogenicity. No significant masses are identified. CUL-DE-SAC:  There is a small amount of free pelvic fluid present. OTHER:  Negative. Impression   CONCLUSION:  Endometrial stripe is enlarged and thickened and slightly heterogeneous, particularly for a postmenopausal patient. Given these findings follow-up with direct visualization would be recommended. EMB Pathology:   Final Diagnosis:      Endometrium; biopsy:   Multiple fragments of proliferative phase endometrium, with features of shedding. Fragments of endocervical tissue with hemorrhage. No evidence of endometrial hyperplasia, atypia or malignancy is identified. ASSESSMENT AND PLAN:  53year old  here for scheduled postmenopausal bleeding and thickened endometrium.   - risks of surgery reviewed including pain, bleeding, infection, uterine perforation. All questions answered, consent form signed and in chart  - proceed to OR when ready.      Plan for DC home once stable post-op    Radha Malhotra, DO

## 2023-10-23 ENCOUNTER — OFFICE VISIT (OUTPATIENT)
Dept: OBGYN CLINIC | Facility: CLINIC | Age: 48
End: 2023-10-23
Payer: COMMERCIAL

## 2023-10-23 VITALS
DIASTOLIC BLOOD PRESSURE: 78 MMHG | WEIGHT: 173 LBS | BODY MASS INDEX: 31.83 KG/M2 | HEIGHT: 62 IN | SYSTOLIC BLOOD PRESSURE: 122 MMHG

## 2023-10-23 DIAGNOSIS — Z09 POSTOPERATIVE EXAMINATION: Primary | ICD-10-CM

## 2023-11-30 ENCOUNTER — OFFICE VISIT (OUTPATIENT)
Dept: ENDOCRINOLOGY CLINIC | Facility: CLINIC | Age: 48
End: 2023-11-30
Payer: COMMERCIAL

## 2023-11-30 DIAGNOSIS — E04.2 MULTIPLE THYROID NODULES: Primary | ICD-10-CM

## 2023-11-30 DIAGNOSIS — E03.8 SUBCLINICAL HYPOTHYROIDISM: ICD-10-CM

## 2023-11-30 PROCEDURE — 99214 OFFICE O/P EST MOD 30 MIN: CPT | Performed by: INTERNAL MEDICINE

## 2023-11-30 RX ORDER — PHENTERMINE HYDROCHLORIDE 15 MG/1
15 CAPSULE ORAL EVERY MORNING
Qty: 30 CAPSULE | Refills: 2 | Status: SHIPPED | OUTPATIENT
Start: 2023-11-30

## 2023-11-30 RX ORDER — LEVOTHYROXINE SODIUM 0.05 MG/1
50 TABLET ORAL
Qty: 90 TABLET | Refills: 2 | Status: SHIPPED | OUTPATIENT
Start: 2023-11-30

## 2023-11-30 NOTE — PROGRESS NOTES
Name: Itz Mirza  Date: 11/30/2023    Referring Physician: No ref. provider found    No chief complaint on file. HISTORY OF PRESENT ILLNESS   Itz Mirza is a 53year old female who presents for No chief complaint on file. 54 y/o F presents for follow up evaluation of possible thyroid disease. She presents today with several nonspecific symptoms and overall not feeling well. She initially presented for evaluation of fibromyalgia and during diagnosis found to have positive TPO Ab. However treatment has not been started due to normal TSH. Of note she is starting to miss menstrual cycles and concerned that she is perimenopausal.  She states that her symptoms continue to get worst despite treatment for fibromyalgia. She is tearful during visit and notes significant increase in depression symptoms. In addition she has significant fatigue and progressive weight gain over the past year. She has tried to decrease calorie intake but weight has not improved. She does note some constipation but she is on narcotic therapy. No significant hair loss. 8/2019  Since last visit she has not been seen for approximately 18 months. Since last visit she notes a significant increase in compression symptoms particularly dysphagia. In 6/2019 she developed fever for a month and underwent significant evaluation. She was referred to rheumatology and started on plaquenil therapy for possible SLE. She has been started on LT4 25mcg PO Daily, taking medication in AM and waiting 30-60 min before eating. She notes improvement since starting medication. Compression Symptoms:  Dysphagia: Yes  Voice Change: No  Dyspnea: No  Anterior Neck Pain: No    Strong family h/o thyroid disease     8/2020  Since last visit she has been stable on Levothyroxine 25mcg PO daily, taking medication in AM and waiting 30-60 min before eating. Normal energy level. She continues to have mild dysphagia.   Her main complaint today is persistently low libido. Previous labs did demonstrate menopause. 12/2021  She is concerned about persistent weight gain in the past year. +20lbs of weight gain over the past 3-4 months. She does note increased snacking at night but still feels the weight gain is not appropriate for amount of food. In addition she notes increased fatigue. +insomnia. +nail breakage     She is maintained on Levothyroxine 25mcg PO daily, taking medication in AM and waiting 30-60 min before eating. In addition she does take Vitamin D supplementation. 11/2023   She has been lost to follow up for 2 years. She did stop taking Levothyroxine over the summer due to lack of refills. She notes increased fatigue and weight gain since being off medication. She has been trying to limit snack but significant appetite. REVIEW OF SYSTEMS  Eyes: no change in vision  Neurologic: no headache, generalized or focal weakness or numbness. Head: normal  ENT: normal  Lungs: no shortness of breath, wheezing or SIMON  Cardiovascular:  no chest pain or palpitations  Gastrointestinal:  no abdominal pain, bowel movement problems  Musculoskeletal: no muscle pain or arthralgia  /Gyne: no frequency or discomfort while urinating  Psychiatric:  no acute distress, anxiety  or depression  Skin: normal moisturized skin    Medications:     Current Outpatient Medications:     cholecalciferol (VITAMIN D3) 125 MCG (5000 UT) Oral Cap, Take 1 capsule (5,000 Units total) by mouth daily. , Disp: , Rfl:     ketamine 100 MG/ML Injection Solution, in the morning, at noon, and at bedtime. KETAMINE 100 MG/ML PF NASAL SPRAY 2 sprays TID Comes from a compounding pharmacy, Disp: , Rfl:     topiramate 100 MG Oral Tab, Take 1 tablet (100 mg total) by mouth 2 (two) times daily. 100 mg AM , 200 mg HS, Disp: , Rfl:     DULoxetine 20 MG Oral Cap DR Particles, Take 1 capsule (20 mg total) by mouth daily. , Disp: , Rfl:     cyclobenzaprine 5 MG Oral Tab, Take 1 tablet (5 mg total) by mouth nightly. (Patient taking differently: Take 1 tablet (5 mg total) by mouth 3 (three) times daily as needed for Muscle spasms.), Disp: 30 tablet, Rfl: 0    HYDROXYCHLOROQUINE 200 MG Oral Tab, TAKE ONE TABLET BY MOUTH TWICE DAILY (Patient not taking: Reported on 10/23/2023), Disp: 180 tablet, Rfl: 1    LEVOTHYROXINE 25 MCG Oral Tab, TAKE 1 TABLET MONDAY-FRIDAY AND TAKE 2 TABLETS SATURDAY AND SUNDAY, Disp: 38 tablet, Rfl: 0    PANTOPRAZOLE 20 MG Oral Tab EC, Take 1 tablet by mouth every morning before breakfast. (Patient not taking: Reported on 9/22/2023), Disp: 30 tablet, Rfl: 0    busPIRone HCl 30 MG Oral Tab, Take 1 tablet (30 mg total) by mouth 2 (two) times daily. , Disp: , Rfl:     cloNIDine 0.1 MG Oral Tab, Take 1 tablet (0.1 mg total) by mouth every morning., Disp: , Rfl:     lamoTRIgine  MG Oral Tablet 24 Hr, Take 1 tablet (300 mg total) by mouth every morning., Disp: , Rfl:     QUEtiapine 200 MG Oral Tab, Take 1 tablet (200 mg total) by mouth nightly., Disp: , Rfl:     triamcinolone acetonide 0.1 % Mouth/Throat Paste, Apply to mouth sores 2-3 times a day as needed (Patient not taking: Reported on 9/22/2023), Disp: 1 each, Rfl: 0    Atomoxetine HCl 80 MG Oral Cap, Take 60 mg by mouth daily. , Disp: , Rfl:      Allergies:    Allergies   Allergen Reactions    Adhesive Tape RASH       Social History:   Social History     Socioeconomic History    Marital status:    Tobacco Use    Smoking status: Former     Packs/day: 0.50     Years: 25.00     Additional pack years: 0.00     Total pack years: 12.50     Types: Cigarettes    Smokeless tobacco: Never    Tobacco comments:     since 17y/o age; quit with pregnancies   Substance and Sexual Activity    Alcohol use: Not Currently     Comment: very rare, once per year    Drug use: Not Currently     Types: Cannabis     Comment: cbd gummies    Sexual activity: Yes     Partners: Male       Medical History:   Past Medical History:   Diagnosis Date Anxiety state     Attention deficit disorder     Back problem     Bipolar disorder (HCC)     Depression     Disorder of thyroid     Hashimoto's    Dyslipidemia     Esophageal reflux     Fibromyalgia     DX 2015    Hashimoto's disease     Migraines     Neck pain     Obesity (BMI 30-39. 9)     PMB (postmenopausal bleeding)     Postmenopausal 2018    Undifferentiated connective tissue disease ([facility] [address]     Visual impairment        Surgical history:   Past Surgical History:   Procedure Laterality Date    BACK SURGERY  12/29/15    L5-S1 ALIF     CHOLECYSTECTOMY      FLUOR GID & [address] NDL/CATH SPI DX/THER NJX N/A 8/28/2015    Procedure: LUMBAR / TRANSFORAMINAL EPIDURAL STEROID INJECTION;  Surgeon: Jammie Luevano DO;  Location: [facility] [address] NDL/CATH SPI DX/THER NJX N/A 9/24/2015    Procedure: LUMBAR / TRANSFORAMINAL EPIDURAL STEROID INJECTION;  Surgeon: Jammie Luevano DO;  Location: [facility] [address] NDL/CATH SPI DX/THER [address] N/A 11/5/2015    Procedure: LUMBAR / TRANSFORAMINAL EPIDURAL STEROID INJECTION;  Surgeon: Jammie Luevano DO;  Location: [address] [place] [address]    INJECTION, W/WO CONTRAST, DX/THERAPEUTIC SUBSTANCE, EPIDURAL/SUBARACHNOID; LUMBAR/SACRAL N/A 8/28/2015    Procedure: LUMBAR / TRANSFORAMINAL EPIDURAL STEROID INJECTION;  Surgeon: Jammie Luevano DO;  Location: [address] [place] [address]    INJECTION, W/WO CONTRAST, DX/THERAPEUTIC SUBSTANCE, EPIDURAL/SUBARACHNOID; LUMBAR/SACRAL N/A 9/24/2015    Procedure: LUMBAR / TRANSFORAMINAL EPIDURAL STEROID INJECTION;  Surgeon: Jammie Luevano DO;  Location: [address] [place] [address]    INJECTION, W/WO CONTRAST, DX/THERAPEUTIC SUBSTANCE, EPIDURAL/SUBARACHNOID; LUMBAR/SACRAL N/A 11/5/2015    Procedure: LUMBAR / TRANSFORAMINAL EPIDURAL STEROID INJECTION;  Surgeon: Jammie Luevano DO;  Location: [address] [place] [address]    REMOVAL GALLBLADDER         PHYSICAL EXAMINATION:  Weight 174;     General Appearance:  Alert, in no acute distress, well developed  Eyes: normal conjunctivae, sclera. Ears/Nose/Mouth/Throat/Neck:  normal hearing, normal speech and diffusely enlarged thyroid gland  Neurologic: sensory grossly intact and motor grossly intact  Musculoskeletal:  normal muscle strength and tone  PV: normal pulses of carotids, pedals  Skin:  normal moisture and skin texture  Hair & Nails:  normal scalp hair     Neuro:  sensory grossly intact and motor grossly intact  Psychiatric:  oriented to time, self, and place  Nutritional:  no abnormal weight gain or loss    ASSESSMENT/PLAN:    1. Hashimoto's Thyroiditis  -Discussed diagnosis  -Restart Levothyroxine 50mcg PO daily   -Discussed taking in AM and waiting 30-60 min before eating    -Recheck TSH, FT4, FT3   -ReCheck Thyroid US given previous nodules  -Further management based on above results     2. Thyroid Nodules  -Discussed common occurrence of thyroid nodules in the population approx 50-60% of the population  -Discussed risk of malignancy is approx 3-5%, 95-97% of nodules are benign  -Discussed recommendation to perform FNA biopsy of nodules greater than 1cm in size  -Discussed that if nodule is benign then plan to follow with yearly thyroid ultrasound  -No need for FNA, recheck US    3.  Obesity  -Discussed importance of weight loss  -Check Cortisol   -Start phentermine 15mg PO daily, verbalized understanding of risks and benefits       RTC 6 months     11/30/2023  Alfred Ratliff MD

## 2023-12-30 ENCOUNTER — HOSPITAL ENCOUNTER (OUTPATIENT)
Dept: ULTRASOUND IMAGING | Age: 48
Discharge: HOME | End: 2023-12-30
Attending: INTERNAL MEDICINE
Payer: COMMERCIAL

## 2023-12-30 ENCOUNTER — LAB ENCOUNTER (OUTPATIENT)
Dept: LAB | Age: 48
End: 2023-12-30
Attending: INTERNAL MEDICINE
Payer: COMMERCIAL

## 2023-12-30 DIAGNOSIS — E04.2 MULTIPLE THYROID NODULES: ICD-10-CM

## 2023-12-30 DIAGNOSIS — N95.0 POST-MENOPAUSAL BLEEDING: ICD-10-CM

## 2023-12-30 DIAGNOSIS — E03.8 SUBCLINICAL HYPOTHYROIDISM: ICD-10-CM

## 2023-12-30 LAB
ANION GAP SERPL CALC-SCNC: 1 MMOL/L (ref 0–18)
BASOPHILS # BLD AUTO: 0.07 X10(3) UL (ref 0–0.2)
BASOPHILS NFR BLD AUTO: 0.6 %
BUN BLD-MCNC: 12 MG/DL (ref 9–23)
BUN/CREAT SERPL: 12.8 (ref 10–20)
CALCIUM BLD-MCNC: 9.4 MG/DL (ref 8.7–10.4)
CHLORIDE SERPL-SCNC: 110 MMOL/L (ref 98–112)
CO2 SERPL-SCNC: 29 MMOL/L (ref 21–32)
CORTIS SERPL-MCNC: <0.5 UG/DL
CREAT BLD-MCNC: 0.94 MG/DL
DEPRECATED HBV CORE AB SER IA-ACNC: 26.2 NG/ML
DEPRECATED RDW RBC AUTO: 39.2 FL (ref 35.1–46.3)
EGFRCR SERPLBLD CKD-EPI 2021: 75 ML/MIN/1.73M2 (ref 60–?)
EOSINOPHIL # BLD AUTO: 0.08 X10(3) UL (ref 0–0.7)
EOSINOPHIL NFR BLD AUTO: 0.7 %
ERYTHROCYTE [DISTWIDTH] IN BLOOD BY AUTOMATED COUNT: 13.9 % (ref 11–15)
FASTING STATUS PATIENT QL REPORTED: NO
GLUCOSE BLD-MCNC: 83 MG/DL (ref 70–99)
HCT VFR BLD AUTO: 41.8 %
HGB BLD-MCNC: 14.1 G/DL
IMM GRANULOCYTES # BLD AUTO: 0.06 X10(3) UL (ref 0–1)
IMM GRANULOCYTES NFR BLD: 0.5 %
IRON SATN MFR SERPL: 13 %
IRON SERPL-MCNC: 38 UG/DL
LYMPHOCYTES # BLD AUTO: 3.24 X10(3) UL (ref 1–4)
LYMPHOCYTES NFR BLD AUTO: 27 %
MCH RBC QN AUTO: 26.8 PG (ref 26–34)
MCHC RBC AUTO-ENTMCNC: 33.7 G/DL (ref 31–37)
MCV RBC AUTO: 79.3 FL
MONOCYTES # BLD AUTO: 0.86 X10(3) UL (ref 0.1–1)
MONOCYTES NFR BLD AUTO: 7.2 %
NEUTROPHILS # BLD AUTO: 7.71 X10 (3) UL (ref 1.5–7.7)
NEUTROPHILS # BLD AUTO: 7.71 X10(3) UL (ref 1.5–7.7)
NEUTROPHILS NFR BLD AUTO: 64 %
OSMOLALITY SERPL CALC.SUM OF ELEC: 289 MOSM/KG (ref 275–295)
PLATELET # BLD AUTO: 323 10(3)UL (ref 150–450)
POTASSIUM SERPL-SCNC: 3.9 MMOL/L (ref 3.5–5.1)
RBC # BLD AUTO: 5.27 X10(6)UL
SODIUM SERPL-SCNC: 140 MMOL/L (ref 136–145)
T4 FREE SERPL-MCNC: 1 NG/DL (ref 0.8–1.7)
TIBC SERPL-MCNC: 304 UG/DL (ref 250–425)
TRANSFERRIN SERPL-MCNC: 204 MG/DL (ref 250–380)
TSI SER-ACNC: 0.91 MIU/ML (ref 0.55–4.78)
WBC # BLD AUTO: 12 X10(3) UL (ref 4–11)

## 2023-12-30 PROCEDURE — 84466 ASSAY OF TRANSFERRIN: CPT

## 2023-12-30 PROCEDURE — 84443 ASSAY THYROID STIM HORMONE: CPT

## 2023-12-30 PROCEDURE — 82533 TOTAL CORTISOL: CPT

## 2023-12-30 PROCEDURE — 83540 ASSAY OF IRON: CPT

## 2023-12-30 PROCEDURE — 82728 ASSAY OF FERRITIN: CPT

## 2023-12-30 PROCEDURE — 85025 COMPLETE CBC W/AUTO DIFF WBC: CPT

## 2023-12-30 PROCEDURE — 36415 COLL VENOUS BLD VENIPUNCTURE: CPT

## 2023-12-30 PROCEDURE — 76536 US EXAM OF HEAD AND NECK: CPT | Performed by: INTERNAL MEDICINE

## 2023-12-30 PROCEDURE — 84439 ASSAY OF FREE THYROXINE: CPT

## 2023-12-30 PROCEDURE — 80048 BASIC METABOLIC PNL TOTAL CA: CPT

## 2024-01-04 DIAGNOSIS — E61.1 LOW IRON: ICD-10-CM

## 2024-01-04 DIAGNOSIS — D72.829 LEUKOCYTOSIS, UNSPECIFIED TYPE: Primary | ICD-10-CM

## 2024-07-06 ENCOUNTER — APPOINTMENT (OUTPATIENT)
Dept: GENERAL RADIOLOGY | Age: 49
End: 2024-07-06
Attending: ORTHOPAEDIC SURGERY

## 2024-07-06 ENCOUNTER — HOSPITAL ENCOUNTER (EMERGENCY)
Age: 49
Discharge: HOME | End: 2024-07-06

## 2024-07-06 ENCOUNTER — APPOINTMENT (OUTPATIENT)
Dept: GENERAL RADIOLOGY | Age: 49
End: 2024-07-06
Attending: EMERGENCY MEDICINE

## 2024-07-06 VITALS
SYSTOLIC BLOOD PRESSURE: 156 MMHG | OXYGEN SATURATION: 98 % | DIASTOLIC BLOOD PRESSURE: 93 MMHG | RESPIRATION RATE: 18 BRPM | HEART RATE: 95 BPM | TEMPERATURE: 99 F

## 2024-07-06 DIAGNOSIS — S52.501A CLOSED FRACTURE OF DISTAL END OF RIGHT RADIUS, UNSPECIFIED FRACTURE MORPHOLOGY, INITIAL ENCOUNTER: Primary | ICD-10-CM

## 2024-07-06 PROCEDURE — 10005281 FL INTRAOPERATIVE C ARM NO REPORT

## 2024-07-06 PROCEDURE — 73060 X-RAY EXAM OF HUMERUS: CPT

## 2024-07-06 PROCEDURE — 10002803 HB RX 637: Performed by: STUDENT IN AN ORGANIZED HEALTH CARE EDUCATION/TRAINING PROGRAM

## 2024-07-06 PROCEDURE — 73100 X-RAY EXAM OF WRIST: CPT

## 2024-07-06 PROCEDURE — 73110 X-RAY EXAM OF WRIST: CPT

## 2024-07-06 PROCEDURE — 10002800 HB RX 250 W HCPCS: Performed by: STUDENT IN AN ORGANIZED HEALTH CARE EDUCATION/TRAINING PROGRAM

## 2024-07-06 PROCEDURE — 73090 X-RAY EXAM OF FOREARM: CPT

## 2024-07-06 PROCEDURE — 96374 THER/PROPH/DIAG INJ IV PUSH: CPT

## 2024-07-06 PROCEDURE — 96376 TX/PRO/DX INJ SAME DRUG ADON: CPT

## 2024-07-06 PROCEDURE — 96375 TX/PRO/DX INJ NEW DRUG ADDON: CPT

## 2024-07-06 PROCEDURE — 25605 CLTX DST RDL FX/EPHYS SEP W/: CPT

## 2024-07-06 PROCEDURE — 10002801 HB RX 250 W/O HCPCS: Performed by: STUDENT IN AN ORGANIZED HEALTH CARE EDUCATION/TRAINING PROGRAM

## 2024-07-06 PROCEDURE — 99284 EMERGENCY DEPT VISIT MOD MDM: CPT

## 2024-07-06 PROCEDURE — 10004651 HB RX, NO CHARGE ITEM: Performed by: STUDENT IN AN ORGANIZED HEALTH CARE EDUCATION/TRAINING PROGRAM

## 2024-07-06 PROCEDURE — 73070 X-RAY EXAM OF ELBOW: CPT

## 2024-07-06 PROCEDURE — 96372 THER/PROPH/DIAG INJ SC/IM: CPT | Performed by: STUDENT IN AN ORGANIZED HEALTH CARE EDUCATION/TRAINING PROGRAM

## 2024-07-06 RX ORDER — IBUPROFEN 200 MG
400 TABLET ORAL ONCE
Status: COMPLETED | OUTPATIENT
Start: 2024-07-06 | End: 2024-07-06

## 2024-07-06 RX ORDER — ACETAMINOPHEN 325 MG/1
650 TABLET ORAL ONCE
Status: COMPLETED | OUTPATIENT
Start: 2024-07-06 | End: 2024-07-06

## 2024-07-06 RX ADMIN — MORPHINE SULFATE 2 MG: 2 INJECTION, SOLUTION INTRAMUSCULAR; INTRAVENOUS at 14:48

## 2024-07-06 RX ADMIN — ACETAMINOPHEN 650 MG: 325 TABLET ORAL at 16:39

## 2024-07-06 RX ADMIN — MORPHINE SULFATE 4 MG: 4 INJECTION INTRAVENOUS at 12:18

## 2024-07-06 RX ADMIN — FENTANYL CITRATE 50 MCG: 50 INJECTION INTRAMUSCULAR; INTRAVENOUS at 17:18

## 2024-07-06 RX ADMIN — MORPHINE SULFATE 2 MG: 2 INJECTION, SOLUTION INTRAMUSCULAR; INTRAVENOUS at 19:42

## 2024-07-06 RX ADMIN — IBUPROFEN 400 MG: 200 TABLET, FILM COATED ORAL at 19:09

## 2024-07-06 ASSESSMENT — PAIN SCALES - GENERAL
PAINLEVEL_OUTOF10: 9
PAINLEVEL_OUTOF10: 10
PAINLEVEL_OUTOF10: 8
PAINLEVEL_OUTOF10: 10
PAINLEVEL_OUTOF10: 9
PAINLEVEL_OUTOF10: 10
PAINLEVEL_OUTOF10: 8
PAINLEVEL_OUTOF10: 9
PAINLEVEL_OUTOF10: 10
PAINLEVEL_OUTOF10: 10
PAINLEVEL_OUTOF10: 4
PAINLEVEL_OUTOF10: 9

## 2024-07-06 ASSESSMENT — PAIN DESCRIPTION - PAIN TYPE
TYPE: ACUTE PAIN

## 2024-07-06 ASSESSMENT — ENCOUNTER SYMPTOMS: WOUND: 0

## 2024-08-13 ENCOUNTER — TELEPHONE (OUTPATIENT)
Dept: ORTHOPEDICS CLINIC | Facility: CLINIC | Age: 49
End: 2024-08-13

## 2024-08-13 DIAGNOSIS — M25.531 RIGHT WRIST PAIN: Primary | ICD-10-CM

## 2024-08-14 ENCOUNTER — HOSPITAL ENCOUNTER (OUTPATIENT)
Dept: GENERAL RADIOLOGY | Age: 49
Discharge: HOME | End: 2024-08-14
Attending: PHYSICIAN ASSISTANT
Payer: COMMERCIAL

## 2024-08-14 ENCOUNTER — OFFICE VISIT (OUTPATIENT)
Dept: ORTHOPEDICS CLINIC | Facility: CLINIC | Age: 49
End: 2024-08-14
Payer: COMMERCIAL

## 2024-08-14 VITALS — BODY MASS INDEX: 31.83 KG/M2 | HEIGHT: 62 IN | WEIGHT: 173 LBS

## 2024-08-14 DIAGNOSIS — S52.551A OTHER CLOSED EXTRA-ARTICULAR FRACTURE OF DISTAL END OF RIGHT RADIUS, INITIAL ENCOUNTER: Primary | ICD-10-CM

## 2024-08-14 DIAGNOSIS — M25.531 RIGHT WRIST PAIN: ICD-10-CM

## 2024-08-14 PROCEDURE — 3008F BODY MASS INDEX DOCD: CPT | Performed by: PHYSICIAN ASSISTANT

## 2024-08-14 PROCEDURE — 99204 OFFICE O/P NEW MOD 45 MIN: CPT | Performed by: PHYSICIAN ASSISTANT

## 2024-08-14 PROCEDURE — 73110 X-RAY EXAM OF WRIST: CPT | Performed by: PHYSICIAN ASSISTANT

## 2024-08-14 NOTE — H&P
Clinic Note [facility]     Assessment/Plan:  49 year old female    Right wrist distal radius fracture treated nonoperatively by another provider, 6 weeks out with some dorsal angulation and shortening-I would like her to wean out of the brace over the next 2 weeks and begin therapy working range of motion exercises.  We discussed that she does have 22 degrees of dorsal angulation at this point will defer surgery and I discussed the possibility of needing an osteotomy down the road if she is very bothered by the lack of motion or if she continues to have pain.  Dr. Tillman reviewed the x-rays today and is in agreement with the plan.      ICD-10-CM    1. Other closed extra-articular fracture of distal end of right radius, initial encounter  S52.551A OCCUPATIONAL THERAPY-EXTERNAL           Follow Up: 5 weeks with x-rays before    Diagnostic Studies:  Right wrist distal radius fracture with 22 degrees of dorsal angulation and radial shortening    Physical Exam:    Ht 5' 2\" (1.575 m)   Wt 173 lb (78.5 kg)   BMI 31.64 kg/m²     Constitutional: NAD. AOx3. Well-developed and Well-nourished.   Psychiatric: Normal mood/ affect/ behavior. Judgment and thought content normal.     Right upper Extremity:   Inspection: Skin Intact. No skin lesions. No gross deformity.   Palpation: Tender palpation at the right wrist mildly at Melody's tubercle and mildly over the ulnar styloid.  No DRUJ instability or pain with stress testing   Motion: Elbow: normal bilateral symmetric ext/flex  Wrist: 20/30, pronation/supination 60/20  Finger: full composite fist       CC: Wrist Injury (RT WRIST FX; ONSET: 7/6/24; FELL AT SPLASH PAD )        HPI: This 49 year old female presents with complaints of pain to her right wrist.  On 7/6/2024 she slipped and fell at a splash pad and landed on her right wrist.  She was treated through an outside provider who gave her an Exos brace and she has been wearing it full-time for the last 6 weeks.  She  continues to have pain especially when trying to move the wrist.  Her pain at rest has improved.  She rates it mild.  She follows that aching and shooting.    @[email or web address]@      Past Medical History:    Anxiety state    Attention deficit disorder    Back problem    Bipolar disorder (HCC)    Depression    Disorder of thyroid    Hashimoto's    Dyslipidemia    Esophageal reflux    Fibromyalgia    DX 2015    Hashimoto's disease    Migraines    Neck pain    Obesity (BMI 30-39.9)    PMB (postmenopausal bleeding)    Postmenopausal    Undifferentiated connective tissue disease (HCC)    Visual impairment       Past Surgical History:   Procedure Laterality Date    Back surgery  12/29/15    L5-S1 ALIF     Cholecystectomy      Fluor gid & loclzj ndl/cath spi dx/ther njx N/A 8/28/2015    Procedure: LUMBAR / TRANSFORAMINAL EPIDURAL STEROID INJECTION;  Surgeon: Nestor Corcoran DO;  Location: [facility] [place]    Fluor gid & loclzj ndl/cath spi dx/ther njx N/A 9/24/2015    Procedure: LUMBAR / TRANSFORAMINAL EPIDURAL STEROID INJECTION;  Surgeon: Nestor Corcoran DO;  Location: [facility] [place]    Fluor gid & loclzj ndl/cath spi dx/ther njx N/A 11/5/2015    Procedure: LUMBAR / TRANSFORAMINAL EPIDURAL STEROID INJECTION;  Surgeon: Nestor Corcoran DO;  [facility]    Injection, w/wo contrast, dx/therapeutic substance, epidural/subarachnoid; lumbar/sacral N/A 8/28/2015    Procedure: LUMBAR / TRANSFORAMINAL EPIDURAL STEROID INJECTION;  Surgeon: Nestor Corcoran DO;  Location: [facility]    Injection, w/wo contrast, dx/therapeutic substance, epidural/subarachnoid; lumbar/sacral N/A 9/24/2015    Procedure: LUMBAR / TRANSFORAMINAL EPIDURAL STEROID INJECTION;  Surgeon: Nestor Corcoran DO;  Location: [facility]    Injection, w/wo contrast, dx/therapeutic substance, epidural/subarachnoid; lumbar/sacral N/A 11/5/2015    Procedure: LUMBAR / TRANSFORAMINAL EPIDURAL  STEROID INJECTION;  Surgeon: Nestor Corcoran DO;  [facility]    Removal gallbladder         Current Outpatient Medications   Medication Sig Dispense Refill    levothyroxine 50 MCG Oral Tab Take 1 tablet (50 mcg total) by mouth before breakfast. 90 tablet 2    cholecalciferol (VITAMIN D3) 125 MCG (5000 UT) Oral Cap Take 1 capsule (5,000 Units total) by mouth daily.      ketamine 100 MG/ML Injection Solution in the morning, at noon, and at bedtime. KETAMINE 100 MG/ML PF NASAL SPRAY 2 sprays TID  Comes from a compounding pharmacy      DULoxetine 20 MG Oral Cap DR Particles Take 1 capsule (20 mg total) by mouth daily.      cyclobenzaprine 5 MG Oral Tab Take 1 tablet (5 mg total) by mouth nightly. (Patient taking differently: Take 1 tablet (5 mg total) by mouth 3 (three) times daily as needed for Muscle spasms.) 30 tablet 0    busPIRone HCl 30 MG Oral Tab Take 1 tablet (30 mg total) by mouth 2 (two) times daily.      cloNIDine 0.1 MG Oral Tab Take 1 tablet (0.1 mg total) by mouth every morning.      lamoTRIgine  MG Oral Tablet 24 Hr Take 1 tablet (300 mg total) by mouth every morning.      QUEtiapine 200 MG Oral Tab Take 1 tablet (200 mg total) by mouth nightly.      Phentermine HCl 15 MG Oral Cap Take 1 capsule (15 mg total) by mouth every morning. 30 capsule 2    topiramate 100 MG Oral Tab Take 1 tablet (100 mg total) by mouth 2 (two) times daily. 100 mg AM , 200 mg HS      HYDROXYCHLOROQUINE 200 MG Oral Tab TAKE ONE TABLET BY MOUTH TWICE DAILY (Patient not taking: Reported on 10/23/2023) 180 tablet 1    LEVOTHYROXINE 25 MCG Oral Tab TAKE 1 TABLET MONDAY-FRIDAY AND TAKE 2 TABLETS SATURDAY AND SUNDAY 38 tablet 0    PANTOPRAZOLE 20 MG Oral Tab EC Take 1 tablet by mouth every morning before breakfast. (Patient not taking: Reported on 9/22/2023) 30 tablet 0    triamcinolone acetonide 0.1 % Mouth/Throat Paste Apply to mouth sores 2-3 times a day as needed (Patient not taking: Reported on  9/22/2023) 1 each 0    Atomoxetine HCl 80 MG Oral Cap Take 60 mg by mouth daily.         Allergies   Allergen Reactions    Adhesive Tape RASH       Family History   Problem Relation Age of Onset    Heart Disorder Father     Other (COPD) Father     Cancer Mother         Kidney and Bladder cancer    Thyroid disease Mother         Goiter sugery    Heart Disorder Mother     Heart Disease Mother     Other (kidney cancer) Mother     Other (bladder cancer) Mother     Skin cancer Mother     Thyroid disease Maternal Grandmother     Colon Cancer Maternal Grandmother     Diabetes Maternal Grandmother     Cancer Maternal Aunt         lung cancer    Ovarian Cancer Half-Sister     Breast Cancer Maternal Cousin     Uterine Cancer Maternal Cousin     Thyroid disease Other         Unlce and Aunt       Social History     Occupational History    Not on file   Tobacco Use    Smoking status: Former     Current packs/day: 0.50     Average packs/day: 0.5 packs/day for 25.0 years (12.5 ttl pk-yrs)     Types: Cigarettes    Smokeless tobacco: Never    Tobacco comments:     since 15y/o age; quit with pregnancies   Substance and Sexual Activity    Alcohol use: Not Currently     Comment: very rare, once per year    Drug use: Not Currently     Types: Cannabis     Comment: cbd gummies    Sexual activity: Yes     Partners: Male        Review of Systems (negative unless bolded):  General: fevers, chills, fatigue  CV:  chest pain, palpitations, leg swelling  Msk: bodyaches, neck pain, neck stiffness  Skin: rashes, open wounds, nonhealing ulcers  Hem: bleeds easily, bruise easily, immunocompromised  Neuro: dizziness, light headedness, headaches  Psych: anxious, depressed, anger issues  Sophia Cruz PA-C  Hand, Wrist, & Elbow Surgery  Physician Assistant to Dr. [email or web address]  t: [phone]  f: [phone]

## 2024-08-26 RX ORDER — LEVOTHYROXINE SODIUM 50 UG/1
50 TABLET ORAL
Qty: 90 TABLET | Refills: 0 | Status: SHIPPED | OUTPATIENT
Start: 2024-08-26

## 2024-08-26 NOTE — TELEPHONE ENCOUNTER
Endocrine refill protocol for medications for hypothyroidism and hyperthyroidism    Protocol Criteria:  PASSED Reason: N/A  Appointment with Endocrinology completed in the last 12 months or scheduled in the next 6 months     Verify appointment has been completed or scheduled in the appropriate timeline. If so can send a 90 day supply with 1 refill per provider protocol.    Normal TSH result in the past 12 months   Review recent telephone encounters and mychart communications with patient to ensure a dose change has not occurred since last office visit that was not updated in the medication history list   Last completed office visit:11/30/2023 Debi Sparks MD   Next scheduled Follow up: no future appt     Last TSH result: 0.914

## 2024-09-06 ENCOUNTER — MED REC SCAN ONLY (OUTPATIENT)
Dept: ORTHOPEDICS CLINIC | Facility: CLINIC | Age: 49
End: 2024-09-06

## 2024-09-17 ENCOUNTER — TELEPHONE (OUTPATIENT)
Dept: ORTHOPEDICS CLINIC | Facility: CLINIC | Age: 49
End: 2024-09-17

## 2024-09-17 DIAGNOSIS — M25.531 RIGHT WRIST PAIN: Primary | ICD-10-CM

## 2024-09-17 DIAGNOSIS — S52.551A OTHER CLOSED EXTRA-ARTICULAR FRACTURE OF DISTAL END OF RIGHT RADIUS, INITIAL ENCOUNTER: ICD-10-CM

## 2024-09-18 ENCOUNTER — HOSPITAL ENCOUNTER (OUTPATIENT)
Dept: GENERAL RADIOLOGY | Age: 49
Discharge: HOME | End: 2024-09-18
Attending: PHYSICIAN ASSISTANT
Payer: COMMERCIAL

## 2024-09-18 ENCOUNTER — OFFICE VISIT (OUTPATIENT)
Dept: ORTHOPEDICS CLINIC | Facility: CLINIC | Age: 49
End: 2024-09-18
Payer: COMMERCIAL

## 2024-09-18 VITALS — HEIGHT: 62 IN | WEIGHT: 173 LBS | BODY MASS INDEX: 31.83 KG/M2

## 2024-09-18 DIAGNOSIS — S52.551A OTHER CLOSED EXTRA-ARTICULAR FRACTURE OF DISTAL END OF RIGHT RADIUS, INITIAL ENCOUNTER: Primary | ICD-10-CM

## 2024-09-18 DIAGNOSIS — S52.551A OTHER CLOSED EXTRA-ARTICULAR FRACTURE OF DISTAL END OF RIGHT RADIUS, INITIAL ENCOUNTER: ICD-10-CM

## 2024-09-18 PROCEDURE — 99213 OFFICE O/P EST LOW 20 MIN: CPT | Performed by: PHYSICIAN ASSISTANT

## 2024-09-18 PROCEDURE — 3008F BODY MASS INDEX DOCD: CPT | Performed by: PHYSICIAN ASSISTANT

## 2024-09-18 PROCEDURE — 73110 X-RAY EXAM OF WRIST: CPT | Performed by: PHYSICIAN ASSISTANT

## 2024-09-18 NOTE — PROGRESS NOTES
Clinic Note [facility]     Assessment/Plan:  49 year old female    Right wrist distal radius fracture treated nonoperatively by another provider-patient is responding well to therapy with increase of range of motion less pain and more strength.  She will continue until she is close to functionally normal.  Have given her a new order for that.  Happy with her recovery thus far.  She still has some ulnar-sided wrist pain and we discussed possible cortisone injection at the next visit if it does not improve.  All of her questions were      ICD-10-CM    1. Other closed extra-articular fracture of distal end of right radius, initial encounter  S52.551A OP REFERRAL TO [facility]     OCCUPATIONAL THERAPY-EXTERNAL           Follow Up: 5 weeks with x-rays before    Diagnostic Studies:  Right wrist distal radius fracture with 22 degrees of dorsal angulation and radial shortening.  More signs of healing since last    Physical Exam:    Ht 5' 2\" (1.575 m)   Wt 173 lb (78.5 kg)   BMI 31.64 kg/m²     Constitutional: NAD. AOx3. Well-developed and Well-nourished.   Psychiatric: Normal mood/ affect/ behavior. Judgment and thought content normal.     Right upper Extremity:   Inspection: Skin Intact. No skin lesions. No gross deformity.   Palpation: Tender palpation at the right wrist mildly at Melody's tubercle and mildly over the ulnar styloid.  No DRUJ instability or pain with stress testing   Motion: Elbow: normal bilateral symmetric ext/flex  Wrist: 40/40, pronation/supination 60/60  Finger: full composite fist       CC: Follow - Up (RT WRIST FX)        HPI: This 49 year old female presents with complaints of pain to her right wrist.  On 7/6/2024 she slipped and fell at a splash pad and landed on her right wrist.  She was treated through an outside provider who gave her an Exos brace and she has been wearing it full-time for the last 6 weeks.  She continues to have pain especially when trying to move the  wrist.  Her pain at rest has improved.  She rates it mild.  She follows that aching and shooting.    Interval history: Patient underwent therapy and she is feeling a lot better        Past Medical History:    Anxiety state    Attention deficit disorder    Back problem    Bipolar disorder (HCC)    Depression    Disorder of thyroid    Hashimoto's    Dyslipidemia    Esophageal reflux    Fibromyalgia    DX 2015    Hashimoto's disease    Migraines    Neck pain    Obesity (BMI 30-39.9)    PMB (postmenopausal bleeding)    Postmenopausal    Undifferentiated connective tissue disease (HCC)    Visual impairment       Past Surgical History:   Procedure Laterality Date    Back surgery  12/29/15    L5-S1 ALIF     Cholecystectomy      Fluor gid & loclzj ndl/cath spi dx/ther njx N/A 8/28/2015    Procedure: LUMBAR / TRANSFORAMINAL EPIDURAL STEROID INJECTION;  Surgeon: Nestor Corcoran DO;  [facility] [facility]    Fluor gid & loclzj ndl/cath spi dx/ther njx N/A 9/24/2015    Procedure: LUMBAR / TRANSFORAMINAL EPIDURAL STEROID INJECTION;  Surgeon: Nestor Corcoran DO;  [facility] [facility]    Fluor gid & loclzj ndl/cath spi dx/ther njx N/A 11/5/2015    Procedure: LUMBAR / TRANSFORAMINAL EPIDURAL STEROID INJECTION;  Surgeon: Nestor Corcoran DO;  [facility]    Injection, w/wo contrast, dx/therapeutic substance, epidural/subarachnoid; lumbar/sacral N/A 8/28/2015    Procedure: LUMBAR / TRANSFORAMINAL EPIDURAL STEROID INJECTION;  Surgeon: Nestor Corcoran DO;  [facility]    Injection, w/wo contrast, dx/therapeutic substance, epidural/subarachnoid; lumbar/sacral N/A 9/24/2015    Procedure: LUMBAR / TRANSFORAMINAL EPIDURAL STEROID INJECTION;  Surgeon: Nestor Corcoran DO;  [facility]    Injection, w/wo contrast, dx/therapeutic substance, epidural/subarachnoid; lumbar/sacral N/A 11/5/2015    Procedure: LUMBAR / TRANSFORAMINAL EPIDURAL  STEROID INJECTION;  Surgeon: Nestor Corcoran DO;  [facility]    Removal gallbladder         Current Outpatient Medications   Medication Sig Dispense Refill    LEVOTHYROXINE 50 MCG Oral Tab TAKE ONE TABLET BY MOUTH DAILY BEFORE BREAKFAST 90 tablet 0    cholecalciferol (VITAMIN D3) 125 MCG (5000 UT) Oral Cap Take 1 capsule (5,000 Units total) by mouth daily.      ketamine 100 MG/ML Injection Solution in the morning, at noon, and at bedtime. KETAMINE 100 MG/ML PF NASAL SPRAY 2 sprays TID  Comes from a compounding pharmacy      DULoxetine 20 MG Oral Cap DR Particles Take 1 capsule (20 mg total) by mouth daily.      cyclobenzaprine 5 MG Oral Tab Take 1 tablet (5 mg total) by mouth nightly. (Patient taking differently: Take 1 tablet (5 mg total) by mouth 3 (three) times daily as needed for Muscle spasms.) 30 tablet 0    busPIRone HCl 30 MG Oral Tab Take 1 tablet (30 mg total) by mouth BID (Nitrates). PT STATES SHE TAKES THIS 1 TIME A DAY      lamoTRIgine  MG Oral Tablet 24 Hr Take 1 tablet (300 mg total) by mouth every morning.      QUEtiapine 200 MG Oral Tab Take 1 tablet (200 mg total) by mouth nightly.      Phentermine HCl 15 MG Oral Cap Take 1 capsule (15 mg total) by mouth every morning. 30 capsule 2    topiramate 100 MG Oral Tab Take 1 tablet (100 mg total) by mouth 2 (two) times daily. 100 mg AM , 200 mg HS      HYDROXYCHLOROQUINE 200 MG Oral Tab TAKE ONE TABLET BY MOUTH TWICE DAILY (Patient not taking: Reported on 10/23/2023) 180 tablet 1    LEVOTHYROXINE 25 MCG Oral Tab TAKE 1 TABLET MONDAY-FRIDAY AND TAKE 2 TABLETS SATURDAY AND SUNDAY 38 tablet 0    PANTOPRAZOLE 20 MG Oral Tab EC Take 1 tablet by mouth every morning before breakfast. (Patient not taking: Reported on 9/22/2023) 30 tablet 0    cloNIDine 0.1 MG Oral Tab Take 1 tablet (0.1 mg total) by mouth every morning.      triamcinolone acetonide 0.1 % Mouth/Throat Paste Apply to mouth sores 2-3 times a day as needed (Patient not  taking: Reported on 9/22/2023) 1 each 0    Atomoxetine HCl 80 MG Oral Cap Take 60 mg by mouth daily.         Allergies   Allergen Reactions    Adhesive Tape RASH       Family History   Problem Relation Age of Onset    Heart Disorder Father     Other (COPD) Father     Cancer Mother         Kidney and Bladder cancer    Thyroid disease Mother         Goiter sugery    Heart Disorder Mother     Heart Disease Mother     Other (kidney cancer) Mother     Other (bladder cancer) Mother     Skin cancer Mother     Thyroid disease Maternal Grandmother     Colon Cancer Maternal Grandmother     Diabetes Maternal Grandmother     Cancer Maternal Aunt         lung cancer    Ovarian Cancer Half-Sister     Breast Cancer Maternal Cousin     Uterine Cancer Maternal Cousin     Thyroid disease Other         Unlce and Aunt       Social History     Occupational History    Not on file   Tobacco Use    Smoking status: Former     Current packs/day: 0.50     Average packs/day: 0.5 packs/day for 25.0 years (12.5 ttl pk-yrs)     Types: Cigarettes    Smokeless tobacco: Never    Tobacco comments:     since 15y/o age; quit with pregnancies   Substance and Sexual Activity    Alcohol use: Not Currently     Comment: very rare, once per year    Drug use: Not Currently     Types: Cannabis     Comment: cbd gummies    Sexual activity: Yes     Partners: Male        Review of Systems (negative unless bolded):  General: fevers, chills, fatigue  CV:  chest pain, palpitations, leg swelling  Msk: bodyaches, neck pain, neck stiffness  Skin: rashes, open wounds, nonhealing ulcers  Hem: bleeds easily, bruise easily, immunocompromised  Neuro: dizziness, light headedness, headaches  Psych: anxious, depressed, anger issues  Sophia Cruz PA-C  Hand, Wrist, & Elbow Surgery  Physician Assistant to Dr. [email or web address]  t: [phone]  f: [phone]

## 2024-10-22 ENCOUNTER — TELEPHONE (OUTPATIENT)
Dept: ORTHOPEDICS CLINIC | Facility: CLINIC | Age: 49
End: 2024-10-22

## 2024-10-22 DIAGNOSIS — S52.551A OTHER CLOSED EXTRA-ARTICULAR FRACTURE OF DISTAL END OF RIGHT RADIUS, INITIAL ENCOUNTER: Primary | ICD-10-CM

## 2024-10-23 ENCOUNTER — OFFICE VISIT (OUTPATIENT)
Dept: ORTHOPEDICS CLINIC | Facility: CLINIC | Age: 49
End: 2024-10-23
Payer: COMMERCIAL

## 2024-10-23 ENCOUNTER — HOSPITAL ENCOUNTER (OUTPATIENT)
Dept: GENERAL RADIOLOGY | Age: 49
Discharge: HOME | End: 2024-10-23
Attending: PHYSICIAN ASSISTANT
Payer: COMMERCIAL

## 2024-10-23 VITALS — HEIGHT: 62 IN | BODY MASS INDEX: 31.83 KG/M2 | WEIGHT: 173 LBS

## 2024-10-23 DIAGNOSIS — S69.81XA INJURY OF TRIANGULAR FIBROCARTILAGE COMPLEX (TFCC) OF RIGHT WRIST, INITIAL ENCOUNTER: ICD-10-CM

## 2024-10-23 DIAGNOSIS — S52.551A OTHER CLOSED EXTRA-ARTICULAR FRACTURE OF DISTAL END OF RIGHT RADIUS, INITIAL ENCOUNTER: ICD-10-CM

## 2024-10-23 DIAGNOSIS — S52.551A OTHER CLOSED EXTRA-ARTICULAR FRACTURE OF DISTAL END OF RIGHT RADIUS, INITIAL ENCOUNTER: Primary | ICD-10-CM

## 2024-10-23 PROCEDURE — 73110 X-RAY EXAM OF WRIST: CPT | Performed by: PHYSICIAN ASSISTANT

## 2024-10-23 RX ORDER — BETAMETHASONE SODIUM PHOSPHATE AND BETAMETHASONE ACETATE 3; 3 MG/ML; MG/ML
6 INJECTION, SUSPENSION INTRA-ARTICULAR; INTRALESIONAL; INTRAMUSCULAR; SOFT TISSUE ONCE
Status: COMPLETED | OUTPATIENT
Start: 2024-10-23 | End: 2024-10-23

## 2024-10-23 RX ADMIN — BETAMETHASONE SODIUM PHOSPHATE AND BETAMETHASONE ACETATE 6 MG: 3; 3 INJECTION, SUSPENSION INTRA-ARTICULAR; INTRALESIONAL; INTRAMUSCULAR; SOFT TISSUE at 15:03:00

## 2024-10-23 NOTE — PROGRESS NOTES
Clinic Note [facility]     Assessment/Plan:  49 year old female    Right wrist distal radius fracture treated nonoperatively by another provider-patient is responding well to therapy with increase of range of motion less pain and more strength.  She will continue until she is close to functionally normal.  Have given her a new order for that.  Happy with her recovery thus far.    Right wrist ulnar-sided wrist pain possible TFC tear-we discussed cortisone injection today.  She elected to proceed and she tolerated the procedure very well.  All of her questions were answered    Injection: Written consent was obtained.  Skin was prepped with ChloraPrep.  1 mL of 6 mg of betamethasone and 1 mL of 1% lidocaine was injected into the right ulnarcarpal joint.  Patient tolerated the procedure well.  No complications were encountered.  Band-Aid was applied.        ICD-10-CM    1. Other closed extra-articular fracture of distal end of right radius, initial encounter  S52.551A OCCUPATIONAL THERAPY-EXTERNAL      2. Injury of triangular fibrocartilage complex (TFCC) of right wrist, initial encounter  S69.81XA Arthrocentesis apiration/injection small Right joint w/o US     betamethasone sodium phosphate & acetate (Celestone) 6 (3-3) MG/ML injection 6 mg     OCCUPATIONAL THERAPY-EXTERNAL           Follow Up: 5 weeks with no need for x-ray    Diagnostic Studies:  Right wrist distal radius fracture with 22 degrees of dorsal angulation and radial shortening.  More signs of healing since last    Physical Exam:    Ht 5' 2\" (1.575 m)   Wt 173 lb (78.5 kg)   BMI 31.64 kg/m²     Constitutional: NAD. AOx3. Well-developed and Well-nourished.   Psychiatric: Normal mood/ affect/ behavior. Judgment and thought content normal.     Right upper Extremity:   Inspection: Skin Intact. No skin lesions. No gross deformity.   Palpation: Tender palpation at the right wrist mildly at Melody's tubercle and mildly over the ulnar styloid.  No DRUJ  instability or pain with stress testing   Motion: Elbow: normal bilateral symmetric ext/flex  Wrist: 40/40, pronation/supination 60/60  Finger: full composite fist       CC: Follow - Up (RT WRIST FX; FEELS LIKE SHE IS STUCK)        HPI: This 49 year old female presents with complaints of pain to her right wrist.  On 7/6/2024 she slipped and fell at a splash pad and landed on her right wrist.  She was treated through an outside provider who gave her an Exos brace and she has been wearing it full-time for the last 6 weeks.  She continues to have pain especially when trying to move the wrist.  Her pain at rest has improved.  She rates it mild.  She follows that aching and shooting.    Interval history: Patient underwent therapy and she is feeling a lot better        Past Medical History:    Anxiety state    Attention deficit disorder    Back problem    Bipolar disorder (HCC)    Depression    Disorder of thyroid    Hashimoto's    Dyslipidemia    Esophageal reflux    Fibromyalgia    DX 2015    Hashimoto's disease    Migraines    Neck pain    Obesity (BMI 30-39.9)    PMB (postmenopausal bleeding)    Postmenopausal    Undifferentiated connective tissue disease (HCC)    Visual impairment       Past Surgical History:   Procedure Laterality Date    Back surgery  12/29/15    L5-S1 ALIF     Cholecystectomy      Fluor gid & loclzj ndl/cath spi dx/ther njx N/A 8/28/2015    Procedure: LUMBAR / TRANSFORAMINAL EPIDURAL STEROID INJECTION;  Surgeon: Nestor Corcoran DO;  Location: [facility]    Fluor gid & loclzj ndl/cath spi dx/ther njx N/A 9/24/2015    Procedure: LUMBAR / TRANSFORAMINAL EPIDURAL STEROID INJECTION;  Surgeon: Nestor Corcoran DO;  Location: [facility]    Fluor gid & loclzj ndl/cath spi dx/ther njx N/A 11/5/2015    Procedure: LUMBAR / TRANSFORAMINAL EPIDURAL STEROID INJECTION;  Surgeon: Nestor Corcoran DO;  Location: [facility]    Injection, w/wo contrast, dx/therapeutic  substance, epidural/subarachnoid; lumbar/sacral N/A 8/28/2015    Procedure: LUMBAR / TRANSFORAMINAL EPIDURAL STEROID INJECTION;  Surgeon: Nestor Corcoran DO;  [facility]    Injection, w/wo contrast, dx/therapeutic substance, epidural/subarachnoid; lumbar/sacral N/A 9/24/2015    Procedure: LUMBAR / TRANSFORAMINAL EPIDURAL STEROID INJECTION;  Surgeon: Nestor Corcoran DO;  [facility]    Injection, w/wo contrast, dx/therapeutic substance, epidural/subarachnoid; lumbar/sacral N/A 11/5/2015    Procedure: LUMBAR / TRANSFORAMINAL EPIDURAL STEROID INJECTION;  Surgeon: Nestor Corcoran DO;  [facility]    Removal gallbladder         Current Outpatient Medications   Medication Sig Dispense Refill    LEVOTHYROXINE 50 MCG Oral Tab TAKE ONE TABLET BY MOUTH DAILY BEFORE BREAKFAST 90 tablet 0    cholecalciferol (VITAMIN D3) 125 MCG (5000 UT) Oral Cap Take 1 capsule (5,000 Units total) by mouth daily.      ketamine 100 MG/ML Injection Solution in the morning, at noon, and at bedtime. KETAMINE 100 MG/ML PF NASAL SPRAY 2 sprays TID  Comes from a compounding pharmacy      DULoxetine 20 MG Oral Cap DR Particles Take 1 capsule (20 mg total) by mouth daily.      cyclobenzaprine 5 MG Oral Tab Take 1 tablet (5 mg total) by mouth nightly. (Patient taking differently: Take 1 tablet (5 mg total) by mouth 3 (three) times daily as needed for Muscle spasms.) 30 tablet 0    busPIRone HCl 30 MG Oral Tab Take 1 tablet (30 mg total) by mouth BID (Nitrates). PT STATES SHE TAKES THIS 1 TIME A DAY      lamoTRIgine  MG Oral Tablet 24 Hr Take 1 tablet (300 mg total) by mouth every morning.      QUEtiapine 200 MG Oral Tab Take 1 tablet (200 mg total) by mouth nightly.      Phentermine HCl 15 MG Oral Cap Take 1 capsule (15 mg total) by mouth every morning. 30 capsule 2    topiramate 100 MG Oral Tab Take 1 tablet (100 mg total) by mouth 2 (two) times daily. 100 mg AM , 200 mg  HS      HYDROXYCHLOROQUINE 200 MG Oral Tab TAKE ONE TABLET BY MOUTH TWICE DAILY (Patient not taking: Reported on 10/23/2023) 180 tablet 1    LEVOTHYROXINE 25 MCG Oral Tab TAKE 1 TABLET MONDAY-FRIDAY AND TAKE 2 TABLETS SATURDAY AND SUNDAY 38 tablet 0    PANTOPRAZOLE 20 MG Oral Tab EC Take 1 tablet by mouth every morning before breakfast. (Patient not taking: Reported on 9/22/2023) 30 tablet 0    cloNIDine 0.1 MG Oral Tab Take 1 tablet (0.1 mg total) by mouth every morning.      triamcinolone acetonide 0.1 % Mouth/Throat Paste Apply to mouth sores 2-3 times a day as needed (Patient not taking: Reported on 9/22/2023) 1 each 0    Atomoxetine HCl 80 MG Oral Cap Take 60 mg by mouth daily.         Allergies   Allergen Reactions    Adhesive Tape RASH       Family History   Problem Relation Age of Onset    Heart Disorder Father     Other (COPD) Father     Cancer Mother         Kidney and Bladder cancer    Thyroid disease Mother         Goiter sugery    Heart Disorder Mother     Heart Disease Mother     Other (kidney cancer) Mother     Other (bladder cancer) Mother     Skin cancer Mother     Thyroid disease Maternal Grandmother     Colon Cancer Maternal Grandmother     Diabetes Maternal Grandmother     Cancer Maternal Aunt         lung cancer    Ovarian Cancer Half-Sister     Breast Cancer Maternal Cousin     Uterine Cancer Maternal Cousin     Thyroid disease Other         Unlce and Aunt       Social History     Occupational History    Not on file   Tobacco Use    Smoking status: Former     Current packs/day: 0.50     Average packs/day: 0.5 packs/day for 25.0 years (12.5 ttl pk-yrs)     Types: Cigarettes    Smokeless tobacco: Never    Tobacco comments:     since 15y/o age; quit with pregnancies   Substance and Sexual Activity    Alcohol use: Not Currently     Comment: very rare, once per year    Drug use: Not Currently     Types: Cannabis     Comment: cbd gummies    Sexual activity: Yes     Partners: Male        Review of  Systems (negative unless bolded):  General: fevers, chills, fatigue  CV:  chest pain, palpitations, leg swelling  Msk: bodyaches, neck pain, neck stiffness  Skin: rashes, open wounds, nonhealing ulcers  Hem: bleeds easily, bruise easily, immunocompromised  Neuro: dizziness, light headedness, headaches  Psych: anxious, depressed, anger issues  Sophia Cruz PA-C  Hand, Wrist, & Elbow Surgery  Physician Assistant to Dr. [email or web address]  t: [phone]  f: [phone]

## 2024-11-01 ENCOUNTER — MED REC SCAN ONLY (OUTPATIENT)
Dept: ORTHOPEDICS CLINIC | Facility: CLINIC | Age: 49
End: 2024-11-01

## 2025-04-28 ENCOUNTER — OFFICE VISIT (OUTPATIENT)
Dept: ENDOCRINOLOGY CLINIC | Facility: CLINIC | Age: 50
End: 2025-04-28
Payer: COMMERCIAL

## 2025-04-28 VITALS
DIASTOLIC BLOOD PRESSURE: 76 MMHG | HEART RATE: 88 BPM | SYSTOLIC BLOOD PRESSURE: 116 MMHG | WEIGHT: 158 LBS | BODY MASS INDEX: 29 KG/M2

## 2025-04-28 DIAGNOSIS — E06.3 HYPOTHYROIDISM DUE TO HASHIMOTO'S THYROIDITIS: Primary | ICD-10-CM

## 2025-04-28 DIAGNOSIS — E04.2 MULTIPLE THYROID NODULES: ICD-10-CM

## 2025-04-28 PROCEDURE — 3078F DIAST BP <80 MM HG: CPT | Performed by: INTERNAL MEDICINE

## 2025-04-28 PROCEDURE — 3074F SYST BP LT 130 MM HG: CPT | Performed by: INTERNAL MEDICINE

## 2025-04-28 PROCEDURE — 99213 OFFICE O/P EST LOW 20 MIN: CPT | Performed by: INTERNAL MEDICINE

## 2025-04-28 RX ORDER — LEVOTHYROXINE SODIUM 50 UG/1
50 TABLET ORAL
Qty: 90 TABLET | Refills: 3 | Status: SHIPPED | OUTPATIENT
Start: 2025-04-28

## 2025-04-28 NOTE — PROGRESS NOTES
Name: Shey King  Date: 4/28/2025    Referring Physician: No ref. provider found    Chief Complaint   Patient presents with    Hypothyroidism     Pt has not taken thyroid medication in about 6 months       HISTORY OF PRESENT ILLNESS   Shey King is a 49 year old female who presents for   Chief Complaint   Patient presents with    Hypothyroidism     Pt has not taken thyroid medication in about 6 months     48 y/o F presents for follow up evaluation of possible thyroid disease.  She presents today with several nonspecific symptoms and overall not feeling well.  She initially presented for evaluation of fibromyalgia and during diagnosis found to have positive TPO Ab.  However treatment has not been started due to normal TSH.  Of note she is starting to miss menstrual cycles and concerned that she is perimenopausal.  She states that her symptoms continue to get worst despite treatment for fibromyalgia.    She is tearful during visit and notes significant increase in depression symptoms.  In addition she has significant fatigue and progressive weight gain over the past year.  She has tried to decrease calorie intake but weight has not improved.  She does note some constipation but she is on narcotic therapy.  No significant hair loss.     8/2019  Since last visit she has not been seen for approximately 18 months.  Since last visit she notes a significant increase in compression symptoms particularly dysphagia.  In 6/2019 she developed fever for a month and underwent significant evaluation.  She was referred to rheumatology and started on plaquenil therapy for possible SLE.  She has been started on LT4 25mcg PO Daily, taking medication in AM and waiting 30-60 min before eating.  She notes improvement since starting medication.     Compression Symptoms:  Dysphagia: Yes  Voice Change: No  Dyspnea: No  Anterior Neck Pain: No    Strong family h/o thyroid disease     8/2020  Since last visit she has been stable on  Levothyroxine 25mcg PO daily, taking medication in AM and waiting 30-60 min before eating.  Normal energy level.  She continues to have mild dysphagia.  Her main complaint today is persistently low libido.  Previous labs did demonstrate menopause.      12/2021  She is concerned about persistent weight gain in the past year.  +20lbs of weight gain over the past 3-4 months.  She does note increased snacking at night but still feels the weight gain is not appropriate for amount of food.      In addition she notes increased fatigue. +insomnia. +nail breakage     She is maintained on Levothyroxine 25mcg PO daily, taking medication in AM and waiting 30-60 min before eating.  In addition she does take Vitamin D supplementation.     11/2023   She has been lost to follow up for 2 years.  She did stop taking Levothyroxine over the summer due to lack of refills.  She notes increased fatigue and weight gain since being off medication.  She has been trying to limit snack but significant appetite.    4/2025  She has been off Levothyroxine for the past 6 months due to lack of refills.  She has been lost to follow up for 18 months.  She notes increased hair loss, dry skin and dry nails.  Of note she has been diagnosed with ADHD and started on Vyvanse therapy.       REVIEW OF SYSTEMS  Eyes: no change in vision  Neurologic: no headache, generalized or focal weakness or numbness.  Head: normal  ENT: normal  Lungs: no shortness of breath, wheezing or SIMON  Cardiovascular:  no chest pain or palpitations  Gastrointestinal:  no abdominal pain, bowel movement problems  Musculoskeletal: no muscle pain or arthralgia  /Gyne: no frequency or discomfort while urinating  Psychiatric:  no acute distress, anxiety  or depression  Skin: normal moisturized skin    Medications:     Current Outpatient Medications:     cholecalciferol (VITAMIN D3) 125 MCG (5000 UT) Oral Cap, Take 1 capsule (5,000 Units total) by mouth daily., Disp: , Rfl:     ketamine  100 MG/ML Injection Solution, in the morning, at noon, and at bedtime. KETAMINE 100 MG/ML PF NASAL SPRAY 2 sprays TID Comes from a compounding pharmacy, Disp: , Rfl:     cyclobenzaprine 5 MG Oral Tab, Take 1 tablet (5 mg total) by mouth nightly. (Patient taking differently: Take 1 tablet (5 mg total) by mouth 3 (three) times daily as needed for Muscle spasms.), Disp: 30 tablet, Rfl: 0    busPIRone HCl 30 MG Oral Tab, Take 1 tablet (30 mg total) by mouth BID (Nitrates). PT STATES SHE TAKES THIS 1 TIME A DAY, Disp: , Rfl:     lamoTRIgine  MG Oral Tablet 24 Hr, Take 1 tablet (300 mg total) by mouth every morning., Disp: , Rfl:     QUEtiapine 200 MG Oral Tab, Take 1 tablet (200 mg total) by mouth nightly., Disp: , Rfl:     LEVOTHYROXINE 50 MCG Oral Tab, TAKE ONE TABLET BY MOUTH DAILY BEFORE BREAKFAST (Patient not taking: Reported on 4/28/2025), Disp: 90 tablet, Rfl: 0    Phentermine HCl 15 MG Oral Cap, Take 1 capsule (15 mg total) by mouth every morning., Disp: 30 capsule, Rfl: 2    topiramate 100 MG Oral Tab, Take 1 tablet (100 mg total) by mouth 2 (two) times daily. 100 mg AM , 200 mg HS, Disp: , Rfl:     DULoxetine 20 MG Oral Cap DR Particles, Take 1 capsule (20 mg total) by mouth daily., Disp: , Rfl:     HYDROXYCHLOROQUINE 200 MG Oral Tab, TAKE ONE TABLET BY MOUTH TWICE DAILY (Patient not taking: Reported on 10/23/2023), Disp: 180 tablet, Rfl: 1    LEVOTHYROXINE 25 MCG Oral Tab, TAKE 1 TABLET MONDAY-FRIDAY AND TAKE 2 TABLETS SATURDAY AND SUNDAY, Disp: 38 tablet, Rfl: 0    PANTOPRAZOLE 20 MG Oral Tab EC, Take 1 tablet by mouth every morning before breakfast. (Patient not taking: Reported on 9/22/2023), Disp: 30 tablet, Rfl: 0    cloNIDine 0.1 MG Oral Tab, Take 1 tablet (0.1 mg total) by mouth every morning., Disp: , Rfl:     triamcinolone acetonide 0.1 % Mouth/Throat Paste, Apply to mouth sores 2-3 times a day as needed (Patient not taking: Reported on 9/22/2023), Disp: 1 each, Rfl: 0    Atomoxetine HCl 80 MG  Oral Cap, Take 60 mg by mouth daily., Disp: , Rfl:      Allergies:   Allergies   Allergen Reactions    Adhesive Tape RASH       Social History:   Social History     Socioeconomic History    Marital status:    Tobacco Use    Smoking status: Former     Current packs/day: 0.50     Average packs/day: 0.5 packs/day for 25.0 years (12.5 ttl pk-yrs)     Types: Cigarettes    Smokeless tobacco: Never    Tobacco comments:     since 15y/o age; quit with pregnancies   Substance and Sexual Activity    Alcohol use: Not Currently     Comment: very rare, once per year    Drug use: Not Currently     Types: Cannabis     Comment: cbd gummies    Sexual activity: Yes     Partners: Male       Medical History:   Past Medical History:    Anxiety state    Attention deficit disorder    Back problem    Bipolar disorder (HCC)    Depression    Disorder of thyroid    Hashimoto's    Dyslipidemia    Esophageal reflux    Fibromyalgia    DX 2015    Hashimoto's disease    Migraines    Neck pain    Obesity (BMI 30-39.9)    PMB (postmenopausal bleeding)    Postmenopausal    Undifferentiated connective tissue disease (HCC)    Visual impairment       Surgical history:   Past Surgical History:   Procedure Laterality Date    Back surgery  12/29/15    L5-S1 ALIF     Cholecystectomy      Fluor gid & loclzj ndl/cath spi dx/ther njx N/A 8/28/2015    Procedure: LUMBAR / TRANSFORAMINAL EPIDURAL STEROID INJECTION;  Surgeon: Nestor Corcoran DO;  Location: [facility] [place]    Fluor gid & loclzj ndl/cath spi dx/ther njx N/A 9/24/2015    Procedure: LUMBAR / TRANSFORAMINAL EPIDURAL STEROID INJECTION;  Surgeon: Nestor Corcoran DO;  Location: [facility]    Fluor gid & loclzj ndl/cath spi dx/ther njx N/A 11/5/2015    Procedure: LUMBAR / TRANSFORAMINAL EPIDURAL STEROID INJECTION;  Surgeon: Nestor Corcoran DO;  Location: [facility]    Injection, w/wo contrast, dx/therapeutic substance, epidural/subarachnoid; lumbar/sacral  N/A 8/28/2015    Procedure: LUMBAR / TRANSFORAMINAL EPIDURAL STEROID INJECTION;  Surgeon: Nestor Corcoran DO;  Location: [facility]    Injection, w/wo contrast, dx/therapeutic substance, epidural/subarachnoid; lumbar/sacral N/A 9/24/2015    Procedure: LUMBAR / TRANSFORAMINAL EPIDURAL STEROID INJECTION;  Surgeon: Nestor Corcoran DO;  Location: [facility]    Injection, w/wo contrast, dx/therapeutic substance, epidural/subarachnoid; lumbar/sacral N/A 11/5/2015    Procedure: LUMBAR / TRANSFORAMINAL EPIDURAL STEROID INJECTION;  Surgeon: Nestor Corcoran DO;  Location: [facility]    Removal gallbladder         PHYSICAL EXAMINATION:  /76   Pulse 88   Wt 158 lb (71.7 kg)   BMI 28.90 kg/m²     General Appearance:  Alert, in no acute distress, well developed  Eyes: normal conjunctivae, sclera.  Ears/Nose/Mouth/Throat/Neck:  normal hearing, normal speech and diffusely enlarged thyroid gland  Neurologic: sensory grossly intact and motor grossly intact  Musculoskeletal:  normal muscle strength and tone  PV: normal pulses of carotids, pedals  Skin:  normal moisture and skin texture  Hair & Nails:  normal scalp hair     Neuro:  sensory grossly intact and motor grossly intact  Psychiatric:  oriented to time, self, and place  Nutritional:  no abnormal weight gain or loss    ASSESSMENT/PLAN:    1. Hashimoto's Thyroiditis  -Discussed diagnosis  -Restart Levothyroxine 50mcg PO daily   -Discussed taking in AM and waiting 30-60 min before eating    -Recheck TSH, FT4, FT3 in 2 months  -ReCheck Thyroid US given previous nodules  -Further management based on above results     2. Thyroid Nodules  -Discussed common occurrence of thyroid nodules in the population approx 50-60% of the population  -Discussed risk of malignancy is approx 3-5%, 95-97% of nodules are benign  -Discussed recommendation to perform FNA biopsy of nodules greater than 1cm in size  -Discussed that if nodule is benign  then plan to follow with yearly thyroid ultrasound  -No need for FNA, recheck US      RTC 1 year    4/28/2025  Debi Sparks MD

## 2025-05-24 ENCOUNTER — HOSPITAL ENCOUNTER (OUTPATIENT)
Dept: ULTRASOUND IMAGING | Age: 50
Discharge: HOME | End: 2025-05-24
Attending: INTERNAL MEDICINE
Payer: COMMERCIAL

## 2025-05-24 DIAGNOSIS — E04.2 MULTIPLE THYROID NODULES: ICD-10-CM

## 2025-05-24 PROCEDURE — 76536 US EXAM OF HEAD AND NECK: CPT | Performed by: INTERNAL MEDICINE

## 2025-07-10 ENCOUNTER — MED REC SCAN ONLY (OUTPATIENT)
Age: 50
End: 2025-07-10

## (undated) DEVICE — SOCK CNSTR 4IN TNPSL UNV SPEC

## (undated) DEVICE — STERILE POLYISOPRENE POWDER-FREE SURGICAL GLOVES WITH EMOLLIENT COATING: Brand: PROTEXIS

## (undated) DEVICE — D AND C PACK: Brand: MEDLINE INDUSTRIES, INC.

## (undated) DEVICE — QUINCKE NEEDLE: Brand: AVANOS*

## (undated) DEVICE — SYRINGE, LUER LOCK, 10ML: Brand: MEDLINE

## (undated) DEVICE — PEN: MARKING STD PT 100/CS: Brand: MEDICAL ACTION INDUSTRIES

## (undated) DEVICE — LEGGINGS SURG W31XL48IN SMS FAB SFT

## (undated) DEVICE — CANISTER SUCT 3000CC HI FLO DISP FOR FLD MGMT

## (undated) DEVICE — KIT HYSTEROSCOPIC PROC INCL INFLO OUTFLO TB

## (undated) DEVICE — ELITE HYSTEROSCOPE SEAL: Brand: TRUCLEAR

## (undated) DEVICE — SOFT TISSUE SHAVER MINI: Brand: TRUCLEAR

## (undated) DEVICE — DRAPE SHEET LG

## (undated) DEVICE — Device

## (undated) DEVICE — SOLUTION IRRIG 3000ML 0.9% NACL FLX CONT

## (undated) DEVICE — DRAPE,UNDRBUT,WHT GRAD PCH,CAPPORT,20/CS: Brand: MEDLINE

## (undated) NOTE — LETTER
07/08/21    Whitney Shaffer  [address] [facility]      Dear Rossy Devine,    [address] [facility] records indicate that you have outstanding lab work and or testing that was ordered for you and has not yet been completed:  Orders Placed This Encounter      C-Re

## (undated) NOTE — LETTER
9/4/2019              Nikos Deshpande        [address] [facility]         Dear Viraj Jacobo,    [address] [facility] records indicate that the repeat thyroid blood tests and thyroid ultrasound ordered for you by Lin Springer MD have not been done.   If you

## (undated) NOTE — LETTER
2/13/2020              Whitney Favorite        [address] [facility]         Dear Rossy Devine,    [address] [facility] records indicate that the mammogram test ordered for you by Amada Salter MD  has not been done.   If you have, in fact, already co

## (undated) NOTE — LETTER
Date: 2020    Patient Name: Ene Sanchez  : 1975        To Whom It May Concern:     This letter has been written at the patient's request. The above named patient is under my professional care since 2017, thus I am familiar with her

## (undated) NOTE — LETTER
3/19/2019              Estuardo Guerra        [address]         Dear Sylvia Yo,    [address] [facility] records indicate that the tests ordered for you by Charisma Rodriguez CNM  have not been done.   If you have, in fact, already completed the te

## (undated) NOTE — LETTER
11/5/2018              Jennifer Yost        [address] [facility]         Dear Hollis Rojo,    It was a pleasure to see you. Your PAP test with HPV was normal.  Current guidelines recommend a repeat Pap smear with HPV in 5 years.   An a

## (undated) NOTE — LETTER
9/17/2018              Ene Sanchez        [address] [facility]         Dear Kisha Whalen,    [address] [facility] records indicate that the tests ordered for you by Gabriel Funk MD  have not been done.   If you have, in fact, already completed the test

## (undated) NOTE — LETTER
Magdaleno Murillo Md  [address] [facility]       12/06/18        Patient: Mara Parsons   YOB: 1975   Date of Visit: 12/6/2018       Dear  Dr. Luz Maria Azul MD,      Thank you for referring Anel Griffin

## (undated) NOTE — ED AVS SNAPSHOT
[facility] in [address] [address] Wesley Jenkins    Phone:  [phone]    Fax:  [phone]           Nacho King   MRN: [id number]    Department:  [facility] in [address] [place] [address]   Date of Visit:  3/ benefit level being available to you or other limited reimbursement. The physician may seek payment directly from you for amounts other than your deductible, co-payment, or co-insurance and for other services not covered under your health insurance plan. If you believe that any of the medications or instructions on this list is different from what your Primary Care doctor has instructed you - please continue to take your medications as instructed by your Primary Care doctor until you can check with your do Patient [address] to help you get signed up for insurance coverage. Patient [address] is a Federal Navigator program that can help with your Affordable Care Act coverage, as well as all types of Medicaid plans.   To get signed up and covere

## (undated) NOTE — ED AVS SNAPSHOT
Nikos Deshpande   MRN: [id number]    Department:  [facility]   Date of Visit:  1/7/2019           Disclosure     Insurance plans vary and the physician(s) referred by the ER may not be covered by your plan.  Please contact yo CARE PHYSICIAN AT ONCE OR RETURN IMMEDIATELY TO THE EMERGENCY DEPARTMENT. If you have been prescribed any medication(s), please fill your prescription right away and begin taking the medication(s) as directed.   If you believe that any of the medications